# Patient Record
Sex: MALE | Race: WHITE | NOT HISPANIC OR LATINO | Employment: OTHER | URBAN - METROPOLITAN AREA
[De-identification: names, ages, dates, MRNs, and addresses within clinical notes are randomized per-mention and may not be internally consistent; named-entity substitution may affect disease eponyms.]

---

## 2017-03-04 ENCOUNTER — HOSPITAL ENCOUNTER (EMERGENCY)
Facility: HOSPITAL | Age: 57
Discharge: HOME | End: 2017-03-04
Attending: EMERGENCY MEDICINE | Admitting: EMERGENCY MEDICINE
Payer: COMMERCIAL

## 2017-03-04 ENCOUNTER — APPOINTMENT (EMERGENCY)
Dept: CT IMAGING | Facility: HOSPITAL | Age: 57
End: 2017-03-04
Payer: COMMERCIAL

## 2017-03-04 VITALS
RESPIRATION RATE: 20 BRPM | WEIGHT: 241.84 LBS | BODY MASS INDEX: 35.82 KG/M2 | HEIGHT: 69 IN | OXYGEN SATURATION: 95 % | DIASTOLIC BLOOD PRESSURE: 71 MMHG | HEART RATE: 72 BPM | TEMPERATURE: 98.2 F | SYSTOLIC BLOOD PRESSURE: 120 MMHG

## 2017-03-04 DIAGNOSIS — K57.32 DIVERTICULITIS OF LARGE INTESTINE WITHOUT PERFORATION OR ABSCESS WITHOUT BLEEDING: Primary | ICD-10-CM

## 2017-03-04 LAB
ALBUMIN SERPL BCP-MCNC: 3.8 G/DL (ref 3.5–5)
ALP SERPL-CCNC: 56 U/L (ref 46–116)
ALT SERPL W P-5'-P-CCNC: 39 U/L (ref 12–78)
ANION GAP SERPL CALCULATED.3IONS-SCNC: 10 MMOL/L (ref 4–13)
AST SERPL W P-5'-P-CCNC: 17 U/L (ref 5–45)
BACTERIA UR QL AUTO: ABNORMAL /HPF
BASOPHILS # BLD AUTO: 0.02 THOUSANDS/ΜL (ref 0–0.1)
BASOPHILS NFR BLD AUTO: 0 % (ref 0–1)
BILIRUB SERPL-MCNC: 0.4 MG/DL (ref 0.2–1)
BILIRUB UR QL STRIP: NEGATIVE
BUN SERPL-MCNC: 16 MG/DL (ref 5–25)
CALCIUM SERPL-MCNC: 9.2 MG/DL (ref 8.3–10.1)
CHLORIDE SERPL-SCNC: 105 MMOL/L (ref 100–108)
CLARITY UR: CLEAR
CO2 SERPL-SCNC: 28 MMOL/L (ref 21–32)
COLOR UR: YELLOW
CREAT SERPL-MCNC: 1.11 MG/DL (ref 0.6–1.3)
EOSINOPHIL # BLD AUTO: 0.22 THOUSAND/ΜL (ref 0–0.61)
EOSINOPHIL NFR BLD AUTO: 3 % (ref 0–6)
ERYTHROCYTE [DISTWIDTH] IN BLOOD BY AUTOMATED COUNT: 14.3 % (ref 11.6–15.1)
GFR SERPL CREATININE-BSD FRML MDRD: >60 ML/MIN/1.73SQ M
GLUCOSE SERPL-MCNC: 88 MG/DL (ref 65–140)
GLUCOSE UR STRIP-MCNC: NEGATIVE MG/DL
HCT VFR BLD AUTO: 45.1 % (ref 36.5–49.3)
HGB BLD-MCNC: 14.7 G/DL (ref 12–17)
HGB UR QL STRIP.AUTO: ABNORMAL
KETONES UR STRIP-MCNC: NEGATIVE MG/DL
LEUKOCYTE ESTERASE UR QL STRIP: NEGATIVE
LYMPHOCYTES # BLD AUTO: 1.81 THOUSANDS/ΜL (ref 0.6–4.47)
LYMPHOCYTES NFR BLD AUTO: 28 % (ref 14–44)
MCH RBC QN AUTO: 30.8 PG (ref 26.8–34.3)
MCHC RBC AUTO-ENTMCNC: 32.6 G/DL (ref 31.4–37.4)
MCV RBC AUTO: 94 FL (ref 82–98)
MONOCYTES # BLD AUTO: 0.55 THOUSAND/ΜL (ref 0.17–1.22)
MONOCYTES NFR BLD AUTO: 8 % (ref 4–12)
MUCOUS THREADS UR QL AUTO: ABNORMAL
NEUTROPHILS # BLD AUTO: 3.96 THOUSANDS/ΜL (ref 1.85–7.62)
NEUTS SEG NFR BLD AUTO: 61 % (ref 43–75)
NITRITE UR QL STRIP: NEGATIVE
NON-SQ EPI CELLS URNS QL MICRO: ABNORMAL /HPF
PH UR STRIP.AUTO: 5.5 [PH] (ref 4.5–8)
PLATELET # BLD AUTO: 249 THOUSANDS/UL (ref 149–390)
PMV BLD AUTO: 10.2 FL (ref 8.9–12.7)
POTASSIUM SERPL-SCNC: 4.1 MMOL/L (ref 3.5–5.3)
PROT SERPL-MCNC: 7.4 G/DL (ref 6.4–8.2)
PROT UR STRIP-MCNC: NEGATIVE MG/DL
RBC # BLD AUTO: 4.78 MILLION/UL (ref 3.88–5.62)
RBC #/AREA URNS AUTO: ABNORMAL /HPF
SODIUM SERPL-SCNC: 143 MMOL/L (ref 136–145)
SP GR UR STRIP.AUTO: 1.02 (ref 1–1.03)
UROBILINOGEN UR QL STRIP.AUTO: 0.2 E.U./DL
WBC # BLD AUTO: 6.56 THOUSAND/UL (ref 4.31–10.16)
WBC #/AREA URNS AUTO: ABNORMAL /HPF

## 2017-03-04 PROCEDURE — 80053 COMPREHEN METABOLIC PANEL: CPT | Performed by: EMERGENCY MEDICINE

## 2017-03-04 PROCEDURE — 81001 URINALYSIS AUTO W/SCOPE: CPT | Performed by: EMERGENCY MEDICINE

## 2017-03-04 PROCEDURE — 87086 URINE CULTURE/COLONY COUNT: CPT | Performed by: EMERGENCY MEDICINE

## 2017-03-04 PROCEDURE — 96360 HYDRATION IV INFUSION INIT: CPT

## 2017-03-04 PROCEDURE — 74177 CT ABD & PELVIS W/CONTRAST: CPT

## 2017-03-04 PROCEDURE — 99284 EMERGENCY DEPT VISIT MOD MDM: CPT

## 2017-03-04 PROCEDURE — 85025 COMPLETE CBC W/AUTO DIFF WBC: CPT | Performed by: EMERGENCY MEDICINE

## 2017-03-04 PROCEDURE — 36415 COLL VENOUS BLD VENIPUNCTURE: CPT | Performed by: EMERGENCY MEDICINE

## 2017-03-04 RX ORDER — ARIPIPRAZOLE 2 MG/1
2 TABLET ORAL
COMMUNITY
End: 2019-08-16 | Stop reason: ALTCHOICE

## 2017-03-04 RX ORDER — CIPROFLOXACIN 500 MG/1
500 TABLET, FILM COATED ORAL ONCE
Status: COMPLETED | OUTPATIENT
Start: 2017-03-04 | End: 2017-03-04

## 2017-03-04 RX ORDER — LISINOPRIL 30 MG/1
30 TABLET ORAL DAILY
COMMUNITY
End: 2020-12-01 | Stop reason: SDUPTHER

## 2017-03-04 RX ORDER — METRONIDAZOLE 500 MG/1
500 TABLET ORAL 3 TIMES DAILY
Qty: 30 TABLET | Refills: 0 | Status: SHIPPED | OUTPATIENT
Start: 2017-03-04 | End: 2017-03-14

## 2017-03-04 RX ORDER — CIPROFLOXACIN 500 MG/1
500 TABLET, FILM COATED ORAL 2 TIMES DAILY
Qty: 20 TABLET | Refills: 0 | Status: SHIPPED | OUTPATIENT
Start: 2017-03-04 | End: 2017-03-14

## 2017-03-04 RX ORDER — ATORVASTATIN CALCIUM 20 MG/1
20 TABLET, FILM COATED ORAL
COMMUNITY
End: 2020-12-01 | Stop reason: SDUPTHER

## 2017-03-04 RX ORDER — METRONIDAZOLE 500 MG/1
500 TABLET ORAL ONCE
Status: COMPLETED | OUTPATIENT
Start: 2017-03-04 | End: 2017-03-04

## 2017-03-04 RX ADMIN — IOHEXOL 100 ML: 350 INJECTION, SOLUTION INTRAVENOUS at 11:40

## 2017-03-04 RX ADMIN — SODIUM CHLORIDE 1000 ML: 0.9 INJECTION, SOLUTION INTRAVENOUS at 10:33

## 2017-03-04 RX ADMIN — METRONIDAZOLE 500 MG: 500 TABLET ORAL at 13:53

## 2017-03-04 RX ADMIN — CIPROFLOXACIN 500 MG: 500 TABLET, FILM COATED ORAL at 13:53

## 2017-03-05 LAB — BACTERIA UR CULT: NORMAL

## 2019-07-08 ENCOUNTER — TELEPHONE (OUTPATIENT)
Dept: CARDIOLOGY CLINIC | Facility: CLINIC | Age: 59
End: 2019-07-08

## 2019-07-09 NOTE — TELEPHONE ENCOUNTER
This patient called back last evening and got the answering service  Please call him back [phone]      Thanks, Dru Brown

## 2019-08-16 ENCOUNTER — OFFICE VISIT (OUTPATIENT)
Dept: CARDIOLOGY CLINIC | Facility: CLINIC | Age: 59
End: 2019-08-16
Payer: COMMERCIAL

## 2019-08-16 ENCOUNTER — TELEPHONE (OUTPATIENT)
Dept: CARDIOLOGY CLINIC | Facility: CLINIC | Age: 59
End: 2019-08-16

## 2019-08-16 VITALS
DIASTOLIC BLOOD PRESSURE: 62 MMHG | BODY MASS INDEX: 34.93 KG/M2 | HEART RATE: 87 BPM | HEIGHT: 69 IN | OXYGEN SATURATION: 98 % | WEIGHT: 235.8 LBS | SYSTOLIC BLOOD PRESSURE: 126 MMHG

## 2019-08-16 DIAGNOSIS — E78.2 MIXED HYPERLIPIDEMIA: ICD-10-CM

## 2019-08-16 DIAGNOSIS — I20.0 UNSTABLE ANGINA (HCC): Primary | ICD-10-CM

## 2019-08-16 DIAGNOSIS — I10 BENIGN ESSENTIAL HYPERTENSION: ICD-10-CM

## 2019-08-16 PROCEDURE — 99204 OFFICE O/P NEW MOD 45 MIN: CPT | Performed by: INTERNAL MEDICINE

## 2019-08-16 PROCEDURE — 93000 ELECTROCARDIOGRAM COMPLETE: CPT | Performed by: INTERNAL MEDICINE

## 2019-08-16 PROCEDURE — 3074F SYST BP LT 130 MM HG: CPT | Performed by: INTERNAL MEDICINE

## 2019-08-16 RX ORDER — ASPIRIN 81 MG/1
81 TABLET ORAL DAILY
COMMUNITY
End: 2022-02-01

## 2019-08-16 RX ORDER — DULOXETIN HYDROCHLORIDE 30 MG/1
30 CAPSULE, DELAYED RELEASE ORAL DAILY
COMMUNITY
End: 2020-12-01

## 2019-09-18 NOTE — TELEPHONE ENCOUNTER
Pt called back and said his insurance is not effective until October so we rescheduled the procedure date to 10/04/19 at [facility] with LT

## 2019-09-18 NOTE — TELEPHONE ENCOUNTER
Pt is scheduled on 09/25/19 for a LHC with Dr Diana Curran at [facility]  Pt aware of instructions      Can I get preauth please and thank you

## 2019-10-02 ENCOUNTER — TELEPHONE (OUTPATIENT)
Dept: CARDIOLOGY CLINIC | Facility: CLINIC | Age: 59
End: 2019-10-02

## 2019-10-02 LAB
BASOPHILS # BLD AUTO: 40 CELLS/UL (ref 0–200)
BASOPHILS NFR BLD AUTO: 0.7 %
BUN SERPL-MCNC: 14 MG/DL (ref 7–25)
BUN/CREAT SERPL: NORMAL (CALC) (ref 6–22)
CALCIUM SERPL-MCNC: 9.6 MG/DL (ref 8.6–10.3)
CHLORIDE SERPL-SCNC: 105 MMOL/L (ref 98–110)
CO2 SERPL-SCNC: 29 MMOL/L (ref 20–32)
CREAT SERPL-MCNC: 1.05 MG/DL (ref 0.7–1.33)
EOSINOPHIL # BLD AUTO: 211 CELLS/UL (ref 15–500)
EOSINOPHIL NFR BLD AUTO: 3.7 %
ERYTHROCYTE [DISTWIDTH] IN BLOOD BY AUTOMATED COUNT: 13 % (ref 11–15)
GLUCOSE SERPL-MCNC: 84 MG/DL (ref 65–139)
HCT VFR BLD AUTO: 43.8 % (ref 38.5–50)
HGB BLD-MCNC: 14.5 G/DL (ref 13.2–17.1)
INR PPP: 1
LYMPHOCYTES # BLD AUTO: 2046 CELLS/UL (ref 850–3900)
LYMPHOCYTES NFR BLD AUTO: 35.9 %
MCH RBC QN AUTO: 31.5 PG (ref 27–33)
MCHC RBC AUTO-ENTMCNC: 33.1 G/DL (ref 32–36)
MCV RBC AUTO: 95 FL (ref 80–100)
MONOCYTES # BLD AUTO: 445 CELLS/UL (ref 200–950)
MONOCYTES NFR BLD AUTO: 7.8 %
NEUTROPHILS # BLD AUTO: 2958 CELLS/UL (ref 1500–7800)
NEUTROPHILS NFR BLD AUTO: 51.9 %
PLATELET # BLD AUTO: 248 THOUSAND/UL (ref 140–400)
PMV BLD REES-ECKER: 9.8 FL (ref 7.5–12.5)
POTASSIUM SERPL-SCNC: 4.3 MMOL/L (ref 3.5–5.3)
PROTHROMBIN TIME: 10.2 SEC (ref 9–11.5)
RBC # BLD AUTO: 4.61 MILLION/UL (ref 4.2–5.8)
SL AMB EGFR AFRICAN AMERICAN: 91 ML/MIN/1.73M2
SL AMB EGFR NON AFRICAN AMERICAN: 78 ML/MIN/1.73M2
SODIUM SERPL-SCNC: 140 MMOL/L (ref 135–146)
WBC # BLD AUTO: 5.7 THOUSAND/UL (ref 3.8–10.8)

## 2019-10-02 NOTE — TELEPHONE ENCOUNTER
His cath [phone] on 10/4/19 at [facility] was approved by [facility] program  Auth# [id number]  10/4/19-11/16/19

## 2019-10-02 NOTE — TELEPHONE ENCOUNTER
Called patient for updated insurance information  Patient states he now has [employer] PPO    ID- [id number]  Group # [id number]

## 2019-10-02 NOTE — TELEPHONE ENCOUNTER
Called patient for updated insurance information  Patient states he now has [employer] PPO    ID- [id number]  Group # [id number]

## 2019-10-03 ENCOUNTER — TELEPHONE (OUTPATIENT)
Dept: INPATIENT UNIT | Facility: HOSPITAL | Age: 59
End: 2019-10-03

## 2019-10-03 RX ORDER — SODIUM CHLORIDE 9 MG/ML
100 INJECTION, SOLUTION INTRAVENOUS CONTINUOUS
Status: CANCELLED | OUTPATIENT
Start: 2019-10-04

## 2019-10-03 RX ORDER — ASPIRIN 81 MG/1
324 TABLET, CHEWABLE ORAL ONCE
Status: CANCELLED | OUTPATIENT
Start: 2019-10-03 | End: 2019-10-03

## 2019-10-04 ENCOUNTER — HOSPITAL ENCOUNTER (OUTPATIENT)
Dept: NON INVASIVE DIAGNOSTICS | Facility: HOSPITAL | Age: 59
Discharge: HOME | End: 2019-10-04
Attending: INTERNAL MEDICINE | Admitting: INTERNAL MEDICINE
Payer: COMMERCIAL

## 2019-10-04 VITALS
TEMPERATURE: 97.8 F | RESPIRATION RATE: 18 BRPM | OXYGEN SATURATION: 92 % | SYSTOLIC BLOOD PRESSURE: 118 MMHG | DIASTOLIC BLOOD PRESSURE: 71 MMHG | HEART RATE: 77 BPM

## 2019-10-04 DIAGNOSIS — I20.0 UNSTABLE ANGINA (HCC): ICD-10-CM

## 2019-10-04 LAB
ATRIAL RATE: 84 BPM
P AXIS: 44 DEGREES
PR INTERVAL: 146 MS
QRS AXIS: 35 DEGREES
QRSD INTERVAL: 84 MS
QT INTERVAL: 366 MS
QTC INTERVAL: 432 MS
T WAVE AXIS: 56 DEGREES
VENTRICULAR RATE: 84 BPM

## 2019-10-04 PROCEDURE — 93458 L HRT ARTERY/VENTRICLE ANGIO: CPT | Performed by: INTERNAL MEDICINE

## 2019-10-04 PROCEDURE — C1769 GUIDE WIRE: HCPCS | Performed by: INTERNAL MEDICINE

## 2019-10-04 PROCEDURE — 99152 MOD SED SAME PHYS/QHP 5/>YRS: CPT | Performed by: INTERNAL MEDICINE

## 2019-10-04 PROCEDURE — 99153 MOD SED SAME PHYS/QHP EA: CPT | Performed by: INTERNAL MEDICINE

## 2019-10-04 PROCEDURE — C1894 INTRO/SHEATH, NON-LASER: HCPCS | Performed by: INTERNAL MEDICINE

## 2019-10-04 PROCEDURE — 93010 ELECTROCARDIOGRAM REPORT: CPT | Performed by: INTERNAL MEDICINE

## 2019-10-04 PROCEDURE — NC001 PR NO CHARGE: Performed by: INTERNAL MEDICINE

## 2019-10-04 PROCEDURE — 93005 ELECTROCARDIOGRAM TRACING: CPT

## 2019-10-04 RX ORDER — FENTANYL CITRATE 50 UG/ML
INJECTION, SOLUTION INTRAMUSCULAR; INTRAVENOUS CODE/TRAUMA/SEDATION MEDICATION
Status: COMPLETED | OUTPATIENT
Start: 2019-10-04 | End: 2019-10-04

## 2019-10-04 RX ORDER — MIDAZOLAM HYDROCHLORIDE 1 MG/ML
INJECTION INTRAMUSCULAR; INTRAVENOUS CODE/TRAUMA/SEDATION MEDICATION
Status: COMPLETED | OUTPATIENT
Start: 2019-10-04 | End: 2019-10-04

## 2019-10-04 RX ORDER — HEPARIN SODIUM 1000 [USP'U]/ML
INJECTION, SOLUTION INTRAVENOUS; SUBCUTANEOUS CODE/TRAUMA/SEDATION MEDICATION
Status: COMPLETED | OUTPATIENT
Start: 2019-10-04 | End: 2019-10-04

## 2019-10-04 RX ORDER — SODIUM CHLORIDE 9 MG/ML
100 INJECTION, SOLUTION INTRAVENOUS CONTINUOUS
Status: DISCONTINUED | OUTPATIENT
Start: 2019-10-04 | End: 2019-10-04 | Stop reason: HOSPADM

## 2019-10-04 RX ORDER — ASPIRIN 81 MG/1
324 TABLET, CHEWABLE ORAL ONCE
Status: COMPLETED | OUTPATIENT
Start: 2019-10-04 | End: 2019-10-04

## 2019-10-04 RX ORDER — VERAPAMIL HYDROCHLORIDE 2.5 MG/ML
INJECTION, SOLUTION INTRAVENOUS CODE/TRAUMA/SEDATION MEDICATION
Status: COMPLETED | OUTPATIENT
Start: 2019-10-04 | End: 2019-10-04

## 2019-10-04 RX ORDER — NITROGLYCERIN 20 MG/100ML
INJECTION INTRAVENOUS CODE/TRAUMA/SEDATION MEDICATION
Status: COMPLETED | OUTPATIENT
Start: 2019-10-04 | End: 2019-10-04

## 2019-10-04 RX ORDER — LIDOCAINE HYDROCHLORIDE 10 MG/ML
INJECTION, SOLUTION INFILTRATION; PERINEURAL CODE/TRAUMA/SEDATION MEDICATION
Status: COMPLETED | OUTPATIENT
Start: 2019-10-04 | End: 2019-10-04

## 2019-10-04 RX ADMIN — MIDAZOLAM 1 MG: 1 INJECTION INTRAMUSCULAR; INTRAVENOUS at 10:53

## 2019-10-04 RX ADMIN — SODIUM CHLORIDE 100 ML/HR: 0.9 INJECTION, SOLUTION INTRAVENOUS at 08:00

## 2019-10-04 RX ADMIN — FENTANYL CITRATE 50 MCG: 50 INJECTION, SOLUTION INTRAMUSCULAR; INTRAVENOUS at 10:53

## 2019-10-04 RX ADMIN — LIDOCAINE HYDROCHLORIDE 1 ML: 10 INJECTION, SOLUTION INFILTRATION; PERINEURAL at 10:41

## 2019-10-04 RX ADMIN — HEPARIN SODIUM 4000 UNITS: 1000 INJECTION INTRAVENOUS; SUBCUTANEOUS at 10:44

## 2019-10-04 RX ADMIN — VERAPAMIL HYDROCHLORIDE 2.5 MG: 2.5 INJECTION INTRAVENOUS at 10:45

## 2019-10-04 RX ADMIN — NITROGLYCERIN 200 MCG: 20 INJECTION INTRAVENOUS at 10:45

## 2019-10-04 RX ADMIN — ASPIRIN 81 MG 324 MG: 81 TABLET ORAL at 07:59

## 2019-10-04 RX ADMIN — IOHEXOL 96 ML: 350 INJECTION, SOLUTION INTRAVENOUS at 11:04

## 2019-10-04 RX ADMIN — FENTANYL CITRATE 50 MCG: 50 INJECTION, SOLUTION INTRAMUSCULAR; INTRAVENOUS at 10:36

## 2019-10-04 RX ADMIN — MIDAZOLAM 2 MG: 1 INJECTION INTRAMUSCULAR; INTRAVENOUS at 10:36

## 2019-10-04 NOTE — DISCHARGE INSTRUCTIONS
1  Please see the post cardiac catheterization dishcarge instructions  No heavy lifting, greater than 10 lbs  or strenuous  activity for 48 hrs  2 Remove band aid tomorrow  Shower and wash area- wrist gently with soap and water- beginning tomorrow  Rinse and pat dry  Apply new water seal band aid  Repeat this process for 5 days  No powders, creams lotions or antibiotic ointments  for 5 days  No tub baths, hot tubs or swimming for 5 days  3  Please call our office ([phone]) if you have any fever, redness, swelling, discharge from your wrist access site  4 No driving for 1 day      Left Heart Catheterization   WHAT YOU NEED TO KNOW:   A left heart catheterization is a procedure to look at your heart and its arteries  You may need this procedure if you have chest pain, heart disease, or your heart is not working as it should  DISCHARGE INSTRUCTIONS:   Follow up with your healthcare provider as directed:  Write down your questions so you remember to ask them during your visits  Limit activity as directed:   · Avoid unnecessary stair climbing for 48 hours, if a catheter was put in your groin  · Do not place pressure on your arm, hand, or wrist, if the catheter was placed in your wrist  Avoid pushing, pulling, or heavy lifting with that arm  · If you need to cough, support the area where the catheter was inserted with your hand  · Ask your healthcare provider how long you need to limit movement and avoid certain activities  · You may feel like resting more after your procedure  Slowly start to do more each day  Rest when you feel it is needed  Drink liquids as directed:  Liquids help flush the dye used for your procedure out of your body  Ask your healthcare provider how much liquid to drink each day, and which liquids to drink  Some foods, such as soup and fruit, also provide liquid  Wound care:  Ask your healthcare provider about how to care for your incision wound   Ask when you can get into a tub, shower, or pool  Contact your healthcare provider if:   · You have a fever  · The skin around your wound is red, swollen, or has pus coming from it  · You have trouble breathing, or your skin is itchy, swollen, or has a rash  · You have questions or concerns about your condition or care  Seek care immediately or call 911 if:   · The area where the catheter was placed is swollen and filled with blood or is bleeding  · The leg or arm used for the procedure becomes numb or turns white or blue  · You feel lightheaded, short of breath, and have chest pain  · You cough up blood  · You have any of the following signs of a heart attack:      ¨ Squeezing, pressure, or pain in your chest that lasts longer than 5 minutes or returns    ¨ Discomfort or pain in your back, neck, jaw, stomach, or arm     ¨ Trouble breathing    ¨ Nausea or vomiting    ¨ Lightheadedness or a sudden cold sweat, especially with chest pain or trouble breathing    · Your arm or leg feels warm, tender, and painful  It may look swollen and red  · You have any of the following signs of a stroke:     ¨ Part of your face droops or is numb    ¨ Weakness in an arm or leg    ¨ Confusion or difficulty speaking    ¨ Dizziness, a severe headache, or vision loss  © 2017 [employer] Information is for End User's use only and may not be sold, redistributed or otherwise used for commercial purposes  All illustrations and images included in CareNotes® are the copyrighted property of A D A M [employer] Inc  or Colin Christian  The above information is an  only  It is not intended as medical advice for individual conditions or treatments  Talk to your doctor, nurse or pharmacist before following any medical regimen to see if it is safe and effective for you

## 2019-10-04 NOTE — H&P
Cardiology   Abbie Brooks  64 y o  male MRN: [id number]  [facility] [place] [facility] Encounter: [id number]      PCP:    Assessment/Plan     Risk factors:  -HLD  -HTN  -RUKHSANA  -familial premature CAD, significant    Assessment/Plan:  Patient with symptoms consistent with unstable angina given chest pain at rest or with exertion escalating over the last few months  Proceed with cardiac catheterization in further intervention based on catheterization findings  Case discussed and reviewed with Dr Abigail Cruz who agrees with my assessment and plan  History of Present Illness     HPI:  Abbie Brooks  is a 64 y o  male who presents with progressive midsternal, nonradiating anginal like chest discomfort with associated shortness of breath lightheadedness and diaphoresis over the past year  Symptoms now occurring even at rest   Patient is primarily sedentary  Patient denies heart failure symptoms  Last meal was the night prior to presentation  No recreational drug, alcohol or tobacco use reported  Significant family history of premature coronary artery disease  Historical Information   Past Medical History:   Diagnosis Date    Asthma due to seasonal allergies     CPAP (continuous positive airway pressure) dependence     Hyperlipidemia     Hypertension     Psychiatric disorder     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 [address]     Sleep apnea      No past surgical history on file    Social History   Social History     Substance and Sexual Activity   Alcohol Use Yes    Comment: occasional     Social History     Substance and Sexual Activity   Drug Use No     Social History     Tobacco Use   Smoking Status Never Smoker   Smokeless Tobacco Never Used     Family History:   Family History   Problem Relation Age of Onset    Hypertension Mother     Arthritis Mother     Hyperlipidemia Mother     Anxiety disorder Mother     Hypertension Father     Coronary artery disease Father     Diabetes Father     Hyperlipidemia Father     Bipolar disorder Father     Cancer Maternal Grandmother     Heart disease Neg Hx     Heart attack Neg Hx     Asthma Neg Hx     Arrhythmia Neg Hx     Anemia Neg Hx     Clotting disorder Neg Hx     Fainting Neg Hx     Heart failure Neg Hx        Meds/Allergies   PTA meds:   Prior to Admission Medications   Prescriptions Last Dose Informant Patient Reported? Taking? Brexpiprazole (REXULTI) 1 MG tablet   Yes No   Sig: Take 1 mg by mouth daily   DULoxetine (CYMBALTA) 30 mg delayed release capsule   Yes No   Sig: Take 30 mg by mouth daily   aspirin (ECOTRIN LOW STRENGTH) 81 mg EC tablet 10/3/2019 at Unknown time  Yes Yes   Sig: Take 81 mg by mouth daily   atorvastatin (LIPITOR) 20 mg tablet  Self Yes No   Sig: Take 20 mg by mouth daily   lisinopril (ZESTRIL) 30 mg tablet  Self Yes No   Sig: Take 30 mg by mouth daily       Facility-Administered Medications: None     Allergies   Allergen Reactions    Lamotrigine Hives       Objective   Vitals: Blood pressure 132/78, pulse 82, temperature 97 8 °F (36 6 °C), temperature source Oral, resp  rate 18, SpO2 92 %  No intake or output data in the 24 hours ending 10/04/19 0950    Invasive Devices     Peripheral Intravenous Line            Peripheral IV 10/04/19 Left Wrist less than 1 day                Review of Systems:  As noted in HPI      Physical Exam   General Appearance:    Alert, cooperative, no distress, appears stated age   Head:    Normocephalic, atraumatic   Eyes:    PERRL, conjunctiva/corneas clear, EOM's intact   Neck:   Supple, No thyroid enlargement/ no carotid bruit or JVD   Lungs:     Clear to auscultation bilaterally, respirations unlabored   Heart:   Normal Rate, Regular rhythm, S1 and S2 normal, no murmur, rub or gallop   Abdomen:     Soft, non-tender, bowel sounds active all four quadrants   Extremities:   Extremities normal, atraumatic, no edema, warm to touch   Pulses:   2+ and symmetric all extremities   Skin:   Warm, Dry, intact Neurologic:   CNII-XII intact  No focal deficit       Lab Results:   CBC with diff:   Results from last 7 days   Lab Units 10/01/19  1228   WHITE BLOOD CELL COUNT  Thousand/uL 5 7   RED BLOOD CELL COUNT  Million/uL 4 61   HEMOGLOBIN  g/dL 14 5   HEMATOCRIT  % 43 8   MCV  fL 95 0   MCH  pg 31 5   MCHC  g/dL 33 1   RDW  % 13 0   PLATELETS  Thousand/uL 248     CMP:   Results from last 7 days   Lab Units 10/01/19  1228   SODIUM mmol/L 140   POTASSIUM mmol/L 4 3   CHLORIDE mmol/L 105   CO2 mmol/L 29   BUN mg/dL 14   CREATININE mg/dL 1 05   SL AMB CALCIUM mg/dL 9 6     Coags:   Results from last 7 days   Lab Units 10/01/19  1228   INR  1 0     Lipid Profile:    07/03/2015  Total 198, HDL 36, , LDL 90      ECHO:  Ordered but not obtained    Stress Test:  None documented    EKG:  Normal sinus rhythm 08/16/2019      Cardiac catheterization: Pending    Tele:  Sinus rhythm    Code Status:  Full code    [facility] Everywhere [facility] reviewed:  Yes    ** Please Note: Fluency DirectDictation voice to text software may have been used in the creation of this document   **

## 2019-10-10 ENCOUNTER — HOSPITAL ENCOUNTER (OUTPATIENT)
Dept: NON INVASIVE DIAGNOSTICS | Facility: CLINIC | Age: 59
Discharge: HOME | End: 2019-10-10
Payer: COMMERCIAL

## 2019-10-10 DIAGNOSIS — I20.0 UNSTABLE ANGINA (HCC): ICD-10-CM

## 2019-10-10 PROCEDURE — 93306 TTE W/DOPPLER COMPLETE: CPT

## 2019-10-10 PROCEDURE — 93306 TTE W/DOPPLER COMPLETE: CPT | Performed by: INTERNAL MEDICINE

## 2019-10-11 ENCOUNTER — TELEPHONE (OUTPATIENT)
Dept: CARDIOLOGY CLINIC | Facility: CLINIC | Age: 59
End: 2019-10-11

## 2019-10-11 NOTE — TELEPHONE ENCOUNTER
Called and spoke to Gregory in regards to his recent echo     ----- Message from Kimberly Morales MD sent at 10/10/2019  4:57 PM EDT -----  Please make patient aware that this test was normal

## 2020-03-16 ENCOUNTER — OFFICE VISIT (OUTPATIENT)
Dept: URGENT CARE | Facility: CLINIC | Age: 60
End: 2020-03-16
Payer: COMMERCIAL

## 2020-03-16 VITALS
BODY MASS INDEX: 35.99 KG/M2 | DIASTOLIC BLOOD PRESSURE: 81 MMHG | SYSTOLIC BLOOD PRESSURE: 148 MMHG | HEART RATE: 101 BPM | HEIGHT: 69 IN | TEMPERATURE: 98.1 F | WEIGHT: 243 LBS | RESPIRATION RATE: 18 BRPM | OXYGEN SATURATION: 94 %

## 2020-03-16 DIAGNOSIS — J06.9 ACUTE URI: Primary | ICD-10-CM

## 2020-03-16 PROCEDURE — 99213 OFFICE O/P EST LOW 20 MIN: CPT | Performed by: NURSE PRACTITIONER

## 2020-03-16 RX ORDER — ALBUTEROL SULFATE 90 UG/1
2 AEROSOL, METERED RESPIRATORY (INHALATION) EVERY 6 HOURS PRN
COMMUNITY
End: 2020-03-16 | Stop reason: SDUPTHER

## 2020-03-16 RX ORDER — ALBUTEROL SULFATE 90 UG/1
2 AEROSOL, METERED RESPIRATORY (INHALATION) EVERY 6 HOURS PRN
Qty: 1 INHALER | Refills: 0 | Status: SHIPPED | OUTPATIENT
Start: 2020-03-16 | End: 2022-02-01 | Stop reason: ALTCHOICE

## 2020-03-16 RX ORDER — FLUTICASONE PROPIONATE 50 MCG
1 SPRAY, SUSPENSION (ML) NASAL DAILY
Qty: 1 BOTTLE | Refills: 0 | Status: SHIPPED | OUTPATIENT
Start: 2020-03-16 | End: 2022-02-01 | Stop reason: ALTCHOICE

## 2020-03-16 RX ORDER — FLUTICASONE FUROATE AND VILANTEROL 100; 25 UG/1; UG/1
1 POWDER RESPIRATORY (INHALATION) DAILY PRN
COMMUNITY
End: 2022-02-01 | Stop reason: ALTCHOICE

## 2020-03-16 NOTE — PATIENT INSTRUCTIONS
Flonase 1 spray each nostril   Mucinex as directed for chest congestion   Tylenol every 4 hours for pain or fever  Ibuprofen every 6 hours for pain or fever  Increase fluid intake   Follow up with your PCP    Upper Respiratory Infection   WHAT YOU NEED TO KNOW:   An upper respiratory infection is also called the common cold  It is an infection that can affect your nose, throat, ears, and sinuses  For healthy people, the common cold is usually not serious and does not need special treatment  Cold symptoms are usually worst for the first 3 to 5 days  Most people get better in 7 to 14 days  You may continue to cough for 2 to 3 weeks  Colds are caused by viruses and do not get better with antibiotics  DISCHARGE INSTRUCTIONS:   Return to the emergency department if:   · You have chest pain or trouble breathing  Contact your healthcare provider if:   · You have a fever over 102ºF (39°C)  · Your sore throat gets worse or you see white or yellow spots in your throat  · Your symptoms get worse after 3 to 5 days or your cold is not better in 14 days  · You have a rash anywhere on your skin  · You have large, tender lumps in your neck  · You have thick, green or yellow drainage from your nose  · You cough up thick yellow, green, or bloody mucus  · You have vomiting for more than 24 hours and cannot keep fluids down  · You have a bad earache  · You have questions or concerns about your condition or care  Medicines: You may need any of the following:  · Decongestants  help reduce nasal congestion and help you breathe more easily  If you take decongestant pills, they may make you feel restless or cause problems with your sleep  Do not use decongestant sprays for more than a few days  · Cough suppressants  help reduce coughing  Ask your healthcare provider which type of cough medicine is best for you  · NSAIDs , such as ibuprofen, help decrease swelling, pain, and fever   NSAIDs can cause stomach bleeding or kidney problems in certain people  If you take blood thinner medicine, always ask your healthcare provider if NSAIDs are safe for you  Always read the medicine label and follow directions  · Acetaminophen  decreases pain and fever  It is available without a doctor's order  Ask how much to take and how often to take it  Follow directions  Read the labels of all other medicines you are using to see if they also contain acetaminophen, or ask your doctor or pharmacist  Acetaminophen can cause liver damage if not taken correctly  Do not use more than 4 grams (4,000 milligrams) total of acetaminophen in one day  · Take your medicine as directed  Contact your healthcare provider if you think your medicine is not helping or if you have side effects  Tell him or her if you are allergic to any medicine  Keep a list of the medicines, vitamins, and herbs you take  Include the amounts, and when and why you take them  Bring the list or the pill bottles to follow-up visits  Carry your medicine list with you in case of an emergency  Follow up with your healthcare provider as directed:  Write down your questions so you remember to ask them during your visits  Self-care:   · Rest as much as possible  Slowly start to do more each day  · Drink more liquids as directed  Liquids will help thin and loosen mucus so you can cough it up  Liquids will also help prevent dehydration  Liquids that help prevent dehydration include water, fruit juice, and broth  Do not drink liquids that contain caffeine  Caffeine can increase your risk for dehydration  Ask your healthcare provider how much liquid to drink each day  · Soothe a sore throat  Gargle with warm salt water  This helps your sore throat feel better  Make salt water by dissolving ¼ teaspoon salt in 1 cup warm water  You may also suck on hard candy or throat lozenges  You may use a sore throat spray  · Use a humidifier or vaporizer    Use a cool mist humidifier or a vaporizer to increase air moisture in your home  This may make it easier for you to breathe and help decrease your cough  · Use saline nasal drops as directed  These help relieve congestion  · Apply petroleum-based jelly around the outside of your nostrils  This can decrease irritation from blowing your nose  · Do not smoke  Nicotine and other chemicals in cigarettes and cigars can make your symptoms worse  They can also cause infections such as bronchitis or pneumonia  Ask your healthcare provider for information if you currently smoke and need help to quit  E-cigarettes or smokeless tobacco still contain nicotine  Talk to your healthcare provider before you use these products  Prevent spreading your cold to others:   · Try to stay away from other people during the first 2 to 3 days of your cold when it is more easily spread  · Do not share food or drinks  · Do not share hand towels with household members  · Wash your hands often, especially after you blow your nose  Turn away from other people and cover your mouth and nose with a tissue when you sneeze or cough  © 2017 [facility] Information is for End User's use only and may not be sold, redistributed or otherwise used for commercial purposes  All illustrations and images included in CareNotes® are the copyrighted property of [employer] M [employer] Inc  or Colin Christian  The above information is an  only  It is not intended as medical advice for individual conditions or treatments  Talk to your doctor, nurse or pharmacist before following any medical regimen to see if it is safe and effective for you

## 2020-03-16 NOTE — PROGRESS NOTES
[facility]        NAME: Nehemias Cary  is a 64 y o  male  : 1960    MRN: [id number]  DATE: 2020  TIME: 7:19 PM    Assessment and Plan   Acute URI [J06 9]  1  Acute URI  fluticasone (FLONASE) 50 mcg/act nasal spray    albuterol (PROVENTIL HFA,VENTOLIN HFA) 90 mcg/act inhaler         Patient Instructions   Flonase 1 spray each nostril   Mucinex as directed for chest congestion   Tylenol every 4 hours for pain or fever  Ibuprofen every 6 hours for pain or fever  Increase fluid intake   Follow up with your PCP    Follow up with PCP in 3-5 days  Proceed to  ER if symptoms worsen  Chief Complaint     Chief Complaint   Patient presents with    Cough     x 5 days, getting worse, chest congestion    Fever     x 2 days, highest 100 5    Nasal Congestion    Generalized Body Aches     sore         History of Present Illness         Patient is a 51-year-old male presenting with a five-day history of cough, sinus congestion, and body aches  He reports a fever for the past 3 days  Max temperature 100 5°  Denies chest pain or shortness of breath  Denies abdominal pain, nausea, vomiting, or diarrhea  He has been using Coricidin with some improvement  He is a nonsmoker  Did not have a flu vaccine this year  Review of Systems   Review of Systems   Constitutional: Positive for fever  Negative for activity change and chills  HENT: Positive for congestion, rhinorrhea and sinus pressure  Negative for ear pain and sore throat  Respiratory: Positive for cough  Negative for shortness of breath  Gastrointestinal: Negative for abdominal pain, diarrhea, nausea and vomiting  Skin: Negative for rash  Neurological: Negative for headaches           Current Medications       Current Outpatient Medications:     albuterol (PROVENTIL HFA,VENTOLIN HFA) 90 mcg/act inhaler, Inhale 2 puffs every 6 (six) hours as needed for wheezing, Disp: 1 Inhaler, Rfl: 0    aspirin (ECOTRIN LOW STRENGTH) 81 mg EC tablet, Take 81 mg by mouth daily, Disp: , Rfl:     atorvastatin (LIPITOR) 20 mg tablet, Take 20 mg by mouth daily, Disp: , Rfl:     Brexpiprazole (REXULTI) 1 MG tablet, Take 1 mg by mouth daily, Disp: , Rfl:     DULoxetine (CYMBALTA) 30 mg delayed release capsule, Take 30 mg by mouth daily, Disp: , Rfl:     fluticasone-vilanterol (Breo Ellipta) 100-25 mcg/inh inhaler, Inhale 1 puff daily Rinse mouth after use , Disp: , Rfl:     lisinopril (ZESTRIL) 30 mg tablet, Take 30 mg by mouth daily , Disp: , Rfl:     fluticasone (FLONASE) 50 mcg/act nasal spray, 1 spray into each nostril daily, Disp: 1 Bottle, Rfl: 0    Current Allergies     Allergies as of 03/16/2020 - Reviewed 03/16/2020   Allergen Reaction Noted    Lamotrigine Hives 02/20/2015            The following portions of the patient's history were reviewed and updated as appropriate: allergies, current medications, past family history, past medical history, past social history, past surgical history and problem list      Past Medical History:   Diagnosis Date    Asthma due to seasonal allergies     CPAP (continuous positive airway pressure) dependence     Hyperlipidemia     Hypertension     Psychiatric disorder     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[facility] [address]     Sleep apnea        History reviewed  No pertinent surgical history  Family History   Problem Relation Age of Onset    Hypertension Mother    Chap[place] Arthritis Mother     Hyperlipidemia Mother     Anxiety disorder Mother     Hypertension Father     Coronary artery disease Father     Diabetes Father     Hyperlipidemia Father     Bipolar disorder Father     Cancer Maternal Grandmother     Heart disease Neg Hx     Heart attack Neg Hx     Asthma Neg Hx     Arrhythmia Neg Hx     Anemia Neg Hx     Clotting disorder Neg Hx     Fainting Neg Hx     Heart failure Neg Hx          Medications have been verified          Objective   /81   Pulse 101   Temp 98 1 °F (36 7 °C)   Resp 18   Ht 5' 9" (1 753 m)   Wt 110 kg (243 lb)   SpO2 94%   BMI 35 88 kg/m²        Physical Exam     Physical Exam   Constitutional: He is oriented to person, place, and time  Vital signs are normal  He appears well-developed and well-nourished  He is active  No distress  HENT:   Head: Normocephalic  Right Ear: Hearing, tympanic membrane, external ear and ear canal normal    Left Ear: Hearing, tympanic membrane, external ear and ear canal normal    Nose: Nose normal    Mouth/Throat: Uvula is midline, oropharynx is clear and moist and mucous membranes are normal    Cardiovascular: Regular rhythm, S1 normal, S2 normal and normal heart sounds  Tachycardia present  Pulmonary/Chest: Effort normal and breath sounds normal  No respiratory distress  He has no decreased breath sounds  He has no wheezes  He has no rhonchi  He has no rales  Neurological: He is alert and oriented to person, place, and time  He is not disoriented  Skin: Skin is warm, dry and intact

## 2020-04-08 ENCOUNTER — TELEPHONE (OUTPATIENT)
Dept: CARDIOLOGY CLINIC | Facility: CLINIC | Age: 60
End: 2020-04-08

## 2020-04-08 DIAGNOSIS — J06.9 ACUTE URI: ICD-10-CM

## 2020-04-08 RX ORDER — FLUTICASONE PROPIONATE 50 MCG
SPRAY, SUSPENSION (ML) NASAL
Qty: 16 ML | Refills: 0 | OUTPATIENT
Start: 2020-04-08

## 2020-10-14 PROBLEM — Z99.89 OSA ON CPAP: Status: ACTIVE | Noted: 2020-10-14

## 2020-10-14 PROBLEM — G47.33 OSA ON CPAP: Status: ACTIVE | Noted: 2020-10-14

## 2020-10-15 ENCOUNTER — TELEPHONE (OUTPATIENT)
Dept: ADMINISTRATIVE | Facility: OTHER | Age: 60
End: 2020-10-15

## 2020-10-15 ENCOUNTER — OFFICE VISIT (OUTPATIENT)
Dept: FAMILY MEDICINE CLINIC | Facility: CLINIC | Age: 60
End: 2020-10-15
Payer: COMMERCIAL

## 2020-10-15 VITALS
RESPIRATION RATE: 16 BRPM | SYSTOLIC BLOOD PRESSURE: 136 MMHG | WEIGHT: 252.4 LBS | HEART RATE: 88 BPM | HEIGHT: 68 IN | TEMPERATURE: 98.6 F | BODY MASS INDEX: 38.25 KG/M2 | DIASTOLIC BLOOD PRESSURE: 72 MMHG | OXYGEN SATURATION: 95 %

## 2020-10-15 DIAGNOSIS — Z11.59 NEED FOR HEPATITIS C SCREENING TEST: ICD-10-CM

## 2020-10-15 DIAGNOSIS — R79.89 LOW TESTOSTERONE: ICD-10-CM

## 2020-10-15 DIAGNOSIS — F41.1 GENERALIZED ANXIETY DISORDER: ICD-10-CM

## 2020-10-15 DIAGNOSIS — E78.2 MIXED HYPERLIPIDEMIA: ICD-10-CM

## 2020-10-15 DIAGNOSIS — F31.81 BIPOLAR 2 DISORDER (HCC): ICD-10-CM

## 2020-10-15 DIAGNOSIS — G47.33 OSA ON CPAP: ICD-10-CM

## 2020-10-15 DIAGNOSIS — Z13.6 SCREENING FOR CARDIOVASCULAR CONDITION: ICD-10-CM

## 2020-10-15 DIAGNOSIS — Z99.89 OSA ON CPAP: ICD-10-CM

## 2020-10-15 DIAGNOSIS — Z11.4 SCREENING FOR HIV (HUMAN IMMUNODEFICIENCY VIRUS): ICD-10-CM

## 2020-10-15 DIAGNOSIS — Z23 NEED FOR VACCINATION: ICD-10-CM

## 2020-10-15 DIAGNOSIS — Z13.1 SCREENING FOR DIABETES MELLITUS: ICD-10-CM

## 2020-10-15 DIAGNOSIS — M02.30 REITER'S DISEASE, REITER'S DISEASE OF UNSPECIFIED SITE: ICD-10-CM

## 2020-10-15 DIAGNOSIS — E66.01 CLASS 2 SEVERE OBESITY WITH SERIOUS COMORBIDITY AND BODY MASS INDEX (BMI) OF 38.0 TO 38.9 IN ADULT, UNSPECIFIED OBESITY TYPE (HCC): ICD-10-CM

## 2020-10-15 DIAGNOSIS — J45.909 ASTHMA DUE TO SEASONAL ALLERGIES: ICD-10-CM

## 2020-10-15 DIAGNOSIS — I10 BENIGN ESSENTIAL HYPERTENSION: ICD-10-CM

## 2020-10-15 DIAGNOSIS — Z00.00 ANNUAL PHYSICAL EXAM: Primary | ICD-10-CM

## 2020-10-15 DIAGNOSIS — R68.82 LOW LIBIDO: ICD-10-CM

## 2020-10-15 DIAGNOSIS — Z86.010 HISTORY OF COLON POLYPS: ICD-10-CM

## 2020-10-15 PROBLEM — I20.0 UNSTABLE ANGINA (HCC): Status: RESOLVED | Noted: 2019-08-16 | Resolved: 2020-10-15

## 2020-10-15 PROCEDURE — 3078F DIAST BP <80 MM HG: CPT | Performed by: FAMILY MEDICINE

## 2020-10-15 PROCEDURE — 3725F SCREEN DEPRESSION PERFORMED: CPT | Performed by: FAMILY MEDICINE

## 2020-10-15 PROCEDURE — 99396 PREV VISIT EST AGE 40-64: CPT | Performed by: FAMILY MEDICINE

## 2020-10-15 PROCEDURE — 90471 IMMUNIZATION ADMIN: CPT | Performed by: FAMILY MEDICINE

## 2020-10-15 PROCEDURE — 90682 RIV4 VACC RECOMBINANT DNA IM: CPT | Performed by: FAMILY MEDICINE

## 2020-10-15 PROCEDURE — 99203 OFFICE O/P NEW LOW 30 MIN: CPT | Performed by: FAMILY MEDICINE

## 2020-10-15 PROCEDURE — 1036F TOBACCO NON-USER: CPT | Performed by: FAMILY MEDICINE

## 2020-10-15 RX ORDER — BREXPIPRAZOLE 2 MG/1
2 TABLET ORAL
COMMUNITY
Start: 2020-08-06 | End: 2020-10-15 | Stop reason: SDUPTHER

## 2020-10-15 RX ORDER — MELATONIN
1000 DAILY
COMMUNITY

## 2020-10-16 RX ORDER — BREXPIPRAZOLE 2 MG/1
TABLET ORAL
Qty: 30 TABLET | Refills: 1 | OUTPATIENT
Start: 2020-10-16

## 2020-11-30 LAB
ALBUMIN SERPL-MCNC: 4.4 G/DL (ref 3.6–5.1)
ALBUMIN/GLOB SERPL: 1.8 (CALC) (ref 1–2.5)
ALP SERPL-CCNC: 47 U/L (ref 35–144)
ALT SERPL-CCNC: 24 U/L (ref 9–46)
AST SERPL-CCNC: 18 U/L (ref 10–35)
BILIRUB SERPL-MCNC: 0.6 MG/DL (ref 0.2–1.2)
BUN SERPL-MCNC: 21 MG/DL (ref 7–25)
BUN/CREAT SERPL: NORMAL (CALC) (ref 6–22)
CALCIUM SERPL-MCNC: 9.2 MG/DL (ref 8.6–10.3)
CHLORIDE SERPL-SCNC: 105 MMOL/L (ref 98–110)
CHOLEST SERPL-MCNC: 193 MG/DL
CHOLEST/HDLC SERPL: 5.5 (CALC)
CO2 SERPL-SCNC: 27 MMOL/L (ref 20–32)
CREAT SERPL-MCNC: 1.11 MG/DL (ref 0.7–1.33)
GLOBULIN SER CALC-MCNC: 2.5 G/DL (CALC) (ref 1.9–3.7)
GLUCOSE SERPL-MCNC: 91 MG/DL (ref 65–99)
HBA1C MFR BLD: 5.5 % OF TOTAL HGB
HCV AB S/CO SERPL IA: 0.01
HCV AB SERPL QL IA: NORMAL
HDLC SERPL-MCNC: 35 MG/DL
HIV 1+2 AB+HIV1 P24 AG SERPL QL IA: NORMAL
LDLC SERPL CALC-MCNC: 109 MG/DL (CALC)
LDLC SERPL DIRECT ASSAY-MCNC: 108 MG/DL
NONHDLC SERPL-MCNC: 158 MG/DL (CALC)
POTASSIUM SERPL-SCNC: 4.2 MMOL/L (ref 3.5–5.3)
PROT SERPL-MCNC: 6.9 G/DL (ref 6.1–8.1)
SL AMB EGFR AFRICAN AMERICAN: 84 ML/MIN/1.73M2
SL AMB EGFR NON AFRICAN AMERICAN: 73 ML/MIN/1.73M2
SODIUM SERPL-SCNC: 140 MMOL/L (ref 135–146)
TESTOST FREE SERPL-MCNC: 38.5 PG/ML (ref 35–155)
TESTOST SERPL-MCNC: 184 NG/DL (ref 250–1100)
TRIGL SERPL-MCNC: 364 MG/DL
TSH SERPL-ACNC: 3.27 MIU/L (ref 0.4–4.5)

## 2020-12-01 ENCOUNTER — TELEPHONE (OUTPATIENT)
Dept: FAMILY MEDICINE CLINIC | Facility: CLINIC | Age: 60
End: 2020-12-01

## 2020-12-01 ENCOUNTER — TELEMEDICINE (OUTPATIENT)
Dept: FAMILY MEDICINE CLINIC | Facility: CLINIC | Age: 60
End: 2020-12-01
Payer: COMMERCIAL

## 2020-12-01 VITALS
BODY MASS INDEX: 38.34 KG/M2 | TEMPERATURE: 97.6 F | WEIGHT: 253 LBS | SYSTOLIC BLOOD PRESSURE: 138 MMHG | HEART RATE: 80 BPM | HEIGHT: 68 IN | DIASTOLIC BLOOD PRESSURE: 87 MMHG

## 2020-12-01 DIAGNOSIS — J45.909 ASTHMA DUE TO SEASONAL ALLERGIES: ICD-10-CM

## 2020-12-01 DIAGNOSIS — E66.01 CLASS 2 SEVERE OBESITY WITH SERIOUS COMORBIDITY AND BODY MASS INDEX (BMI) OF 38.0 TO 38.9 IN ADULT, UNSPECIFIED OBESITY TYPE (HCC): ICD-10-CM

## 2020-12-01 DIAGNOSIS — R68.82 LOW LIBIDO: ICD-10-CM

## 2020-12-01 DIAGNOSIS — F41.1 GENERALIZED ANXIETY DISORDER: ICD-10-CM

## 2020-12-01 DIAGNOSIS — F31.81 BIPOLAR 2 DISORDER (HCC): ICD-10-CM

## 2020-12-01 DIAGNOSIS — Z86.010 HISTORY OF COLON POLYPS: ICD-10-CM

## 2020-12-01 DIAGNOSIS — R79.89 LOW TESTOSTERONE: ICD-10-CM

## 2020-12-01 DIAGNOSIS — Z99.89 OSA ON CPAP: ICD-10-CM

## 2020-12-01 DIAGNOSIS — Z00.00 MEDICARE ANNUAL WELLNESS VISIT, SUBSEQUENT: Primary | ICD-10-CM

## 2020-12-01 DIAGNOSIS — G47.33 OSA ON CPAP: ICD-10-CM

## 2020-12-01 DIAGNOSIS — E78.2 MIXED HYPERLIPIDEMIA: ICD-10-CM

## 2020-12-01 DIAGNOSIS — I10 BENIGN ESSENTIAL HYPERTENSION: ICD-10-CM

## 2020-12-01 PROCEDURE — 1036F TOBACCO NON-USER: CPT | Performed by: FAMILY MEDICINE

## 2020-12-01 PROCEDURE — 3075F SYST BP GE 130 - 139MM HG: CPT | Performed by: FAMILY MEDICINE

## 2020-12-01 PROCEDURE — 3725F SCREEN DEPRESSION PERFORMED: CPT | Performed by: FAMILY MEDICINE

## 2020-12-01 PROCEDURE — 3008F BODY MASS INDEX DOCD: CPT | Performed by: FAMILY MEDICINE

## 2020-12-01 PROCEDURE — 99214 OFFICE O/P EST MOD 30 MIN: CPT | Performed by: FAMILY MEDICINE

## 2020-12-01 PROCEDURE — 3079F DIAST BP 80-89 MM HG: CPT | Performed by: FAMILY MEDICINE

## 2020-12-01 RX ORDER — ATORVASTATIN CALCIUM 20 MG/1
20 TABLET, FILM COATED ORAL
Qty: 90 TABLET | Refills: 2 | Status: SHIPPED | OUTPATIENT
Start: 2020-12-01 | End: 2021-08-31 | Stop reason: SDUPTHER

## 2020-12-01 RX ORDER — DULOXETIN HYDROCHLORIDE 60 MG/1
60 CAPSULE, DELAYED RELEASE ORAL DAILY
Qty: 30 CAPSULE | Refills: 1 | Status: SHIPPED | OUTPATIENT
Start: 2020-12-01 | End: 2021-01-19 | Stop reason: SDUPTHER

## 2020-12-01 RX ORDER — LISINOPRIL 30 MG/1
30 TABLET ORAL DAILY
Qty: 90 TABLET | Refills: 2 | Status: SHIPPED | OUTPATIENT
Start: 2020-12-01 | End: 2021-08-31 | Stop reason: SDUPTHER

## 2021-01-04 DIAGNOSIS — F41.1 GENERALIZED ANXIETY DISORDER: ICD-10-CM

## 2021-01-04 RX ORDER — DULOXETIN HYDROCHLORIDE 60 MG/1
CAPSULE, DELAYED RELEASE ORAL
Qty: 30 CAPSULE | Refills: 1 | OUTPATIENT
Start: 2021-01-04

## 2021-01-15 NOTE — PROGRESS NOTES
Assessment/Plan:  Problem List Items Addressed This Visit        Respiratory    RUKHSANA on CPAP     Patient is overdue for sleep medicine follow-up  Increased weight gain may be contributing to his fatigue if his CPAP settings are not appropriate  Cardiovascular and Mediastinum    Benign essential hypertension - Primary     Uncontrolled  Check blood pressure outside of office  Recommend lifestyle modifications  Other    Obesity, unspecified     Stable  Recommend lifestyle modifications  Generalized anxiety disorder     Stable  Work stress contributing  Continue Cymbalta 60 mg daily - patient declines dose increase  Continue Rexulti 2 mg nightly  Patient is considering counseling  Smart phone nathaniel list and counseling list provided again today  Discussed possible EAP counseling through employer  Relevant Medications    DULoxetine (CYMBALTA) 60 mg delayed release capsule    Bipolar 2 disorder (HCC)     Stable  Work stress contributing  Continue Cymbalta 60 mg daily - patient declines dose increase  Continue Rexulti 2 mg nightly  Patient is considering counseling  Smart phone nathaniel list and counseling list provided again today  Discussed possible EAP counseling through employer  Relevant Medications    DULoxetine (CYMBALTA) 60 mg delayed release capsule           Return if symptoms worsen or fail to improve  Future Appointments   Date Time Provider Oh Clement   1/19/2021  6:20 PM DO [facility]   4/1/2021  8:00 AM nAna Marie Godwin DO [facility]        Subjective:     Nenita Valdez is a 63 y o  male who presents today for a follow-up on his chronic medical conditions  HPI:  Chief Complaint   Patient presents with    COVID-19    Virtual Regular Visit     -- Above per clinical staff and reviewed   --      HPI      Today:    Return in about 6 weeks (around 1/12/2021) for F/U Anx, BPD, Labs; 4mo - HTN, HL, Anx, BPD, Low T, RUKHSANA, Obesity, Labs       Patient did not complete labs 2020      Patient desires PSA - Last PSA 1 2 on 20  [facility] declines BRITTANY       Low Testosterone - x 2 20 and 20   Low libido  Denies ED  Previously referred to Urology on 20          Obesity - Watching diet - avoid salt in food, but eating increased portions of healthy good   No regular exercise   Previously enjoyed bike riding        HTN - On Lisinopril 30mg QD   No higher dose or other HTN Rx previously   No BP check outside of office  [facility] has an arm BP cuff at home   Stable Echo 10/10/19        Hyperlipidemia - On Lipitor 20mg QHS   No higher doses   Unsure of other statin name that he had tried previously - ineffective   s/p clean cardiac cath 10/4/19        RUKHSANA on CPAP - Uses CPAP regularly   Overdue for Sleep Specialist on [address] - last seen when he got a new CPAP machine in 2018  Last sleep study 2017?, no weight changes in the interim         Bipolar Disease - Feels mood is stable, but more anxious due to work stress  He will need to take a technical test in the future (focusing on precursors now) and is on unspecified review  Feels motivation is low   On Rexulti 2mg QHS - has not been on higher doses   D/C Lamictal due to hives   Poor social supports  [facility] counseling 2018 - helpful   No SI/HI/AH/VH        Anxiety - On increased Cymbalta 60mg QD x 6 weeks   Feels mood is improved despite increased work stressors  Feels 40-50% improved    Has not been on higher doses   Previously on Zoloft - ineffective            PHQ-9 Depression Screening    PHQ-9:   Frequency of the following problems over the past two weeks:      Little interest or pleasure in doing things: 1 - several days  Feeling down, depressed, or hopeless: 1 - several days  Trouble falling or staying asleep, or sleeping too much: 1 - several days  Feeling tired or having little energy: 1 - several days  Poor appetite or overeatin - several days  Feeling bad about yourself - or that you are a failure or have let yourself or your family down: 1 - several days  Trouble concentrating on things, such as reading the newspaper or watching television: 0 - not at all  Moving or speaking so slowly that other people could have noticed  Or the opposite - being so fidgety or restless that you have been moving around a lot more than usual: 1 - several days  Thoughts that you would be better off dead, or of hurting yourself in some way: 0 - not at all  PHQ-2 Score: 2  PHQ-9 Score: 7         VENANCIO-7 Flowsheet Screening      Most Recent Value   Over the last two weeks, how often have you been bothered by the following problems? Feeling nervous, anxious, or on edge  3   Not being able to stop or control worrying  3   Worrying too much about different things  3   Trouble relaxing   0   Being so restless that it's hard to sit still  0   Becoming easily annoyed or irritable   0   Feeling afraid as if something awful might happen  1   How difficult have these problems made it for you to do your work, take care of things at home, or get along with other people? Not difficult at all   VENANCIO Score   10        MDQ:  3               From previous note:    Return in about 6 weeks (around 11/26/2020) for Recheck HTN, HL, Anx, BPD, Low T, RUKHSANA, Obesity, Labs      Wife in background of video visit           Patient desires PSA - Last PSA 1 2 on 6/29/20  [facility] declines BRITTANY       Low Testosterone - x 2 6/29/20 and 11/25/20   Low libido    Denies ED        Obesity - Watching diet - avoid salt in food, but eating increased portions of healthy good   No regular exercise   Previously enjoyed bike riding        HTN - On Lisinopril 30mg QD   No higher dose or other HTN Rx previously   No BP check outside of office  [facility] has an arm BP cuff at home   Stable Echo 10/10/19        Hyperlipidemia - On Lipitor 20mg QHS   No higher doses   Unsure of other statin name that he had tried previously - ineffective   s/p clean cardiac cath 10/4/19        RUKHSANA on CPAP - Uses CPAP regularly   Overdue for Sleep Specialist on [address] - last seen when he got a new CPAP machine in 2018  Last sleep study 2017?, no weight changes in the interim         Bipolar Disease - Feels mood is stable, but more anxious due to worse stress  He will need to take a technical test and is on a 60-day review  Feels motivation is low   On Rexulti 2mg QHS - has not been on higher doses   D/C Lamictal due to hives   Poor social supports  [facility] - helpful   No SI/HI/AH/VH        Anxiety - On Cymbalta 30mg QD   Has not been on higher doses   Previously on Zoloft - ineffective              PHQ-9 Depression Screening    PHQ-9:   Frequency of the following problems over the past two weeks:      Little interest or pleasure in doing things: 0 - not at all  Feeling down, depressed, or hopeless: 1 - several days  PHQ-2 Score: 1                VENANCIO-7 Flowsheet Screening      Most Recent Value   Over the last two weeks, how often have you been bothered by the following problems? Feeling nervous, anxious, or on edge  3   Not being able to stop or control worrying  1   Worrying too much about different things  0   Trouble relaxing   0   Being so restless that it's hard to sit still  0   Becoming easily annoyed or irritable   0   Feeling afraid as if something awful might happen  1   How difficult have these problems made it for you to do your work, take care of things at home, or get along with other people?    Not difficult at all   VENANCIO Score   5          MDQ:  2                 From previous note:     Controlled Substance Review     [place] or [place]  reviewed: No red flags were identified; safe to proceed with prescription       Patient desires PSA - Last PSA 1 2 on 6/29/20  Luc Funes declines BRITTANY       Low Testosterone - x 1 6/29/20   Low libido          Obesity - Watching diet - avoid salt in food, but eating increased portions of healthy good   No regular exercise   Previously enjoyed bike riding        HTN - On Lisinopril 30mg QD   No higher dose or other HTN Rx previously   No BP check outside of office  Luc Funes has an arm BP cuff at home   Stable Echo 10/10/19        Hyperlipidemia - On Lipitor 20mg QHS   No higher doses   Unsure of other statin name that he had tried previously - ineffective   s/p clean cardiac cath 10/4/19        RUKHSANA on CPAP - Uses CPAP regularly   Overdue for Sleep Specialist on [address] - last seen when he got a new CPAP machine in 2018        Asthma - Triggered by allergies   Uses Albuterol HFA PRN and Breo 100-25 1 puff WD PRN   Last use 3/20   No ICS previously  Severo craig  per Asthma / Henok Joseph for appoint        Bipolar Disease - Feels mood is stable, manic the past few days Re:  Spending money, under better control   Feels motivation is low   On Rexulti 2mg QHS - has not been on higher doses   D/C Lamictal due to hives   Poor social supports  Severo Paige counseling 2018 - helpful   No SI/HI/AH/VH        Anxiety - On Cymbalta 30mg QD   Has not been on higher doses   Previously on Zoloft - ineffective           PHQ-9 Depression Screening    PHQ-9:    Frequency of the following problems over the past two weeks:       Little interest or pleasure in doing things:  0 - not at all  Feeling down, depressed, or hopeless:  0 - not at all  Trouble falling or staying asleep, or sleeping too much:  0 - not at all  Feeling tired or having little energy:  1 - several days  Poor appetite or overeatin - more than half the days  Feeling bad about yourself - or that you are a failure or have let yourself or your family down:  0 - not at all  Trouble concentrating on things, such as reading the newspaper or watching television:  0 - not at all  Moving or speaking so slowly that other people could have noticed   Or the opposite - being so fidgety or restless that you have been moving around a lot more than usual:  0 - not at all  Thoughts that you would be better off dead, or of hurting yourself in some way:  0 - not at all  PHQ-2 Score:  0  PHQ-9 Score:  3               VENANCIO-7 Flowsheet Screening      Most Recent Value   Over the last two weeks, how often have you been bothered by the following problems? Feeling nervous, anxious, or on edge  0   Not being able to stop or control worrying  0   Worrying too much about different things  0   Trouble relaxing   0   Being so restless that it's hard to sit still  0   Becoming easily annoyed or irritable   0   Feeling afraid as if something awful might happen  0   How difficult have these problems made it for you to do your work, take care of things at home, or get along with other people?   Not difficult at all   VENANCIO Score   0          MDQ:  10, Minor Problem     Soledad's Syndrome - x 2   S/p Antibiotics   Never followed c Rheum        H/O Colon Polyps - Colonoscopy per [facility] 1411 Dr Harish Hull of next colonoscopy        Reviewed:  Labs 11/25/20     Last Uro in [place], [place] c Soledad's Flare   S/p cysto   Last prostate exam years ago = 2017 c colonoscopy - BPH        Sees Dentist q6 months  Overdue for Optho               The following portions of the patient's history were reviewed and updated as appropriate: allergies, current medications, past family history, past medical history, past social history, past surgical history and problem list       Review of Systems   Constitutional: Positive for fatigue  Negative for appetite change, chills, diaphoresis and fever  Respiratory: Negative for chest tightness and shortness of breath  Cardiovascular: Negative for chest pain  Gastrointestinal: Negative for abdominal pain, blood in stool, diarrhea, nausea and vomiting  Genitourinary: Negative for dysuria          Current Outpatient Medications   Medication Sig Dispense Refill    albuterol (PROVENTIL HFA,VENTOLIN HFA) 90 mcg/act inhaler Inhale 2 puffs every 6 (six) hours as needed for wheezing 1 Inhaler 0    aspirin (ECOTRIN LOW STRENGTH) 81 mg EC tablet Take 81 mg by mouth daily      atorvastatin (LIPITOR) 20 mg tablet Take 1 tablet (20 mg total) by mouth daily at bedtime 90 tablet 2    Brexpiprazole (Rexulti) 2 MG tablet Take 1 tablet (2 mg total) by mouth daily at bedtime 90 tablet 2    cholecalciferol (VITAMIN D3) 1,000 units tablet Take 1,000 Units by mouth daily      DULoxetine (CYMBALTA) 60 mg delayed release capsule Take 1 capsule (60 mg total) by mouth daily 90 capsule 2    fluticasone (FLONASE) 50 mcg/act nasal spray 1 spray into each nostril daily 1 Bottle 0    fluticasone-vilanterol (Breo Ellipta) 100-25 mcg/inh inhaler Inhale 1 puff daily as needed Rinse mouth after use   lisinopril (ZESTRIL) 30 mg tablet Take 1 tablet (30 mg total) by mouth daily 90 tablet 2     No current facility-administered medications for this visit  Objective:  /89 Comment: Per Patient  Pulse 84   Temp 97 7 °F (36 5 °C)   Ht 5' 7 64" (1 718 m)   Wt 115 kg (253 lb)   BMI 38 88 kg/m²    Wt Readings from Last 3 Encounters:   01/19/21 115 kg (253 lb)   12/01/20 115 kg (253 lb)   10/15/20 114 kg (252 lb 6 4 oz)      BP Readings from Last 3 Encounters:   01/19/21 144/89   12/01/20 138/87   10/15/20 136/72          Physical Exam  Vitals signs and nursing note reviewed  Constitutional:       General: He is not in acute distress  Appearance: Normal appearance  He is well-developed  He is obese  He is not diaphoretic  HENT:      Head: Normocephalic and atraumatic  Right Ear: External ear normal       Left Ear: External ear normal       Nose: Nose normal    Eyes:      General: No scleral icterus  Right eye: No discharge  Left eye: No discharge  Extraocular Movements: Extraocular movements intact  Conjunctiva/sclera: Conjunctivae normal    Neck:      Musculoskeletal: Normal range of motion  Thyroid: No thyromegaly     Cardiovascular:      Rate and Rhythm: Normal rate  Pulmonary:      Effort: Pulmonary effort is normal  No respiratory distress  Breath sounds: No stridor  No wheezing  Musculoskeletal:         General: No swelling  Right lower leg: No edema  Left lower leg: No edema  Lymphadenopathy:      Cervical: No cervical adenopathy  Skin:     Findings: No rash  Neurological:      General: No focal deficit present  Mental Status: He is alert and oriented to person, place, and time  Psychiatric:         Attention and Perception: Attention and perception normal          Mood and Affect: Mood is depressed  Speech: Speech normal          Behavior: Behavior normal  Behavior is cooperative  Thought Content: Thought content normal          Cognition and Memory: Cognition and memory normal          Judgment: Judgment normal          Lab Results:      Lab Results   Component Value Date    WBC 5 7 10/01/2019    HGB 14 5 10/01/2019    HCT 43 8 10/01/2019     10/01/2019    TRIG 364 (H) 11/25/2020    HDL 35 (L) 11/25/2020    LDLDIRECT 108 (H) 11/25/2020    ALT 24 11/25/2020    AST 18 11/25/2020    K 4 2 11/25/2020     11/25/2020    CREATININE 1 11 11/25/2020    BUN 21 11/25/2020    CO2 27 11/25/2020    INR 1 0 10/01/2019    HGBA1C 5 5 11/25/2020     No results found for: URICACID  Invalid input(s): BASENAME Vitamin D    No results found  POCT Labs      BMI Counseling: Body mass index is 38 88 kg/m²  The BMI is above normal  Nutrition recommendations include encouraging healthy choices of fruits and vegetables  Exercise recommendations include exercising 3-5 times per week  No pharmacotherapy was ordered

## 2021-01-19 ENCOUNTER — TELEMEDICINE (OUTPATIENT)
Dept: FAMILY MEDICINE CLINIC | Facility: CLINIC | Age: 61
End: 2021-01-19
Payer: COMMERCIAL

## 2021-01-19 VITALS
SYSTOLIC BLOOD PRESSURE: 144 MMHG | HEART RATE: 84 BPM | HEIGHT: 68 IN | WEIGHT: 253 LBS | DIASTOLIC BLOOD PRESSURE: 89 MMHG | BODY MASS INDEX: 38.34 KG/M2 | TEMPERATURE: 97.7 F

## 2021-01-19 DIAGNOSIS — Z99.89 OSA ON CPAP: ICD-10-CM

## 2021-01-19 DIAGNOSIS — E66.01 CLASS 2 SEVERE OBESITY WITH SERIOUS COMORBIDITY AND BODY MASS INDEX (BMI) OF 38.0 TO 38.9 IN ADULT, UNSPECIFIED OBESITY TYPE (HCC): ICD-10-CM

## 2021-01-19 DIAGNOSIS — F41.1 GENERALIZED ANXIETY DISORDER: ICD-10-CM

## 2021-01-19 DIAGNOSIS — F31.81 BIPOLAR 2 DISORDER (HCC): ICD-10-CM

## 2021-01-19 DIAGNOSIS — I10 BENIGN ESSENTIAL HYPERTENSION: Primary | ICD-10-CM

## 2021-01-19 DIAGNOSIS — G47.33 OSA ON CPAP: ICD-10-CM

## 2021-01-19 PROCEDURE — 1036F TOBACCO NON-USER: CPT | Performed by: FAMILY MEDICINE

## 2021-01-19 PROCEDURE — 3077F SYST BP >= 140 MM HG: CPT | Performed by: FAMILY MEDICINE

## 2021-01-19 PROCEDURE — 3008F BODY MASS INDEX DOCD: CPT | Performed by: FAMILY MEDICINE

## 2021-01-19 PROCEDURE — 3079F DIAST BP 80-89 MM HG: CPT | Performed by: FAMILY MEDICINE

## 2021-01-19 PROCEDURE — 99214 OFFICE O/P EST MOD 30 MIN: CPT | Performed by: FAMILY MEDICINE

## 2021-01-19 PROCEDURE — 3725F SCREEN DEPRESSION PERFORMED: CPT | Performed by: FAMILY MEDICINE

## 2021-01-19 RX ORDER — DULOXETIN HYDROCHLORIDE 60 MG/1
60 CAPSULE, DELAYED RELEASE ORAL DAILY
Qty: 90 CAPSULE | Refills: 2 | Status: SHIPPED | OUTPATIENT
Start: 2021-01-19 | End: 2021-04-13

## 2021-01-19 NOTE — ASSESSMENT & PLAN NOTE
Stable  Work stress contributing  Continue Cymbalta 60 mg daily - patient declines dose increase  Continue Rexulti 2 mg nightly  Patient is considering counseling  Smart phone nathaniel list and counseling list provided again today  Discussed possible EAP counseling through employer

## 2021-01-19 NOTE — PATIENT INSTRUCTIONS
Apps and Websites for Counseling, Anxiety/Depression, Chronic Pain, Lifestyle Change, Problem-solving, Self-Esteem, Anger Management, Coping with Uncertainty    (Prices current as of 9/5/19)    1  Mood Kit - Available in [employer] Krysten Store for $4 99  Supports a person's success in specific situations, includes thought , mood tracker, and journal   Can be accessed in an unstructured way and used as an unguided self-help krysten  2   Moodnotes - Available in [employer] K[employer] Store for $4 99  Focuses on healthier thinking habits and identifying "traps" in thinking  Tracks mood over a period of time and identifies factors that influence mood  3   MoodMission - Available in [facility] for free  Includes five "missions" to promote confidence in handling stressors and coping in specific situations  The krysten personalizes its style and techniques based on when the user engages it most frequently  In-krysten rewards are used to motivate, increase fun and self-confidence  Helpful for patients who need improvement in mood or a decrease in anxiety and depression symptoms  4    What's Up - Available in [employer] for free  Recognizes negative thinking patterns and methods to overcome them  Uses helpful metaphors, a catastrophe scale, grounding techniques, and breathing exercises  Has the ability to sync data across multiple devices and protect this information with a unique pass code  Has the capability to be active in forums where people can discuss similar feelings and strategies that have been helpful  5   Moodpath - Available in [employer] for free  Uses daily screenings to create a better understanding of thoughts, feelings, and emotions  When needed, it provides a discussion guide to talking with a medical professional based on the responses to daily screenings    Includes over 150 psychological exercises and videos to promote and strengthen overall mental health  6   MindShift - Available in [place]  for free  Helpful for youth and young adults in coping with anxiety  Creates an individualized toolbox to help users deal with test anxiety, perfectionism, social anxiety, worry, panic, and conflict  Includes directions on how to construct belief experiments to trial common beliefs that feed anxiety, guided relaxation, as well as tools and tips to help establish and accomplish goals  Differentiates between helpful and unhelpful anxiety, and explains how to overcome fears by gradually facing them in manageable steps  7   CBT-I  - Available in [place]  for free  For insomnia  Educates about sleep, developing positive sleep routines, and improved sleep environment  Encourages user to change behaviors which will provide confidence for better sleep on a regular basis  8   [employer] - Free website  Provides self-help and therapy resources that encourages change to combat negative and destructive thought patterns  Includes access to numerous handouts on a variety of symptoms related to anxiety, depression, low self-esteem, panic attacks, social disorders, and more  The solution section has interventions that can be printed and saved for future use  9   Woebot - Available in [place]  for free  Recommended for age 16+  Friendly self-care  that helps you think through situations with step-by-step guidance using counseling tools, learn about yourself with intelligent mood tracking, and master skills to reduce stress and live happier through 100+ evidence-based stories from clinicians  Chat as often or as little as you'd like, whenever you'd like to  10   Madeline CONLEY  CBT DBT Chatbot - Available in Apple appsstore and Google Play for free, has in-nathaniel purchases  Recommended for 12+  Psychologist support at $30/month, 50% off to start    Felicita Holcomb / chatbot is free  Uses techniques of CBT, DBT, yoga, and meditation to support your needs regarding stress, anxiety, sleep, loss, and a full range of other mental health and wellness issues  11   Curable Pain Relief - Available in [employer] Krysten store and Google Play for free, has in-krysten purchases  Teaches about the chronic pain cycle and how to reverse it, while retraining your brain and nervous system for long-term results  Smart  Antonella guides user through updates in pain science to identify, target, eliminate the factors that are keeping user "stuck" in the pain cycle  12   [employer] Online Therapy - Available in Apple Krysten store and Google Play for a fee  Subscription service, starting at $59/week (billed monthly)  All plans include unlimited messaging, and add live video sessions if desired  Unlimited messaging therapy for teens ages 15-14 and special services for couples therapy  You can change therapists or stop subscription renewal at any time  Recommended for 13+  User is matched with a licensed therapist in your state and communicate on your device by text, audio, and video from anywhere, at any time  User will hear back at least once a day, 5 days per week  Refer to the back of insurance card for counseling options  Counseling services:    [facility]   [address] [facility], [address] [place] [address], [facility], [address] (they have equine therapy too)  [address] [place] [address],  [facility], [address] [address], [address],  [facility], [address]    [address] [address]- [phone]     [address] [place] [address], [address]  [facility] [address]    [address] [facility]   [address], [place], [address]     [phone]     [facility]   [address] [facility], [facility], [address]     Munira Garb, MSW, LSW  (patients age 11-adult)   [address], [address] [facility], 243 [facility] [address] [facility] [address] [facility] [address], [address], [address]   [facility], [address]  [address] [facility], [address] [facility] [address] [facility], [facility], [place], [address]  [address] [facility] [address] (specializing in addiction)  [facility], [address] [facility], [address]  1441 [facility]  [address] [facility], [place], [address] [facility]   [place]      [address], [address] [facility] , [address], [place], [address], [place], [facility], [address] [facility], [address], [place], [address]  [address] [address], [address] [facility], [address] [facility], [address]   [phone]    Reginia [place], [address] [facility] [address]  [place] [place] [id number] [phone]      Hotlines:   Please program these numbers into your phone in case you or someone you know needs them  All services are free  WarmLine:  [phone] or [phone]   They provide a supportive listening ear if you need it  They also can also provide information about mental health concerns and services  Crisis Line:  [facility] [phone],  [facility] [phone], [address] [place] [address]: [phone]   24/7 crisis counseling, on-site counseling and walk-in counseling services available  National Suicide Prevention Lifeline:  4-990-165-425.755.2952  [address] [facility] [phone]   This is free, confidential, and available 24/7  [place]: [phone]   For those facing or having survived abuse 24 hour confidential help line, emergency safe house, counseling, advocacy and education    Crisis Text Line: text 730441     You are connected to a Crisis Counselor to bring a hot moment to a cool calm through active listening and collaborative problem solving  If you or someone your know are feeling as though you are going to hurt yourself, do not wait - GET HELP RIGHT AWAY  Go to the closest Emergency Room, call 911, or call someone you trust, family member or friend to be with you until you can get some help

## 2021-01-19 NOTE — ASSESSMENT & PLAN NOTE
Patient is overdue for sleep medicine follow-up  Increased weight gain may be contributing to his fatigue if his CPAP settings are not appropriate

## 2021-03-30 DIAGNOSIS — Z23 ENCOUNTER FOR IMMUNIZATION: ICD-10-CM

## 2021-03-31 LAB
ALBUMIN SERPL-MCNC: 4.2 G/DL (ref 3.6–5.1)
ALBUMIN/GLOB SERPL: 1.8 (CALC) (ref 1–2.5)
ALP SERPL-CCNC: 45 U/L (ref 35–144)
ALT SERPL-CCNC: 24 U/L (ref 9–46)
AST SERPL-CCNC: 16 U/L (ref 10–35)
BILIRUB SERPL-MCNC: 0.5 MG/DL (ref 0.2–1.2)
BUN SERPL-MCNC: 22 MG/DL (ref 7–25)
BUN/CREAT SERPL: NORMAL (CALC) (ref 6–22)
CALCIUM SERPL-MCNC: 9.3 MG/DL (ref 8.6–10.3)
CHLORIDE SERPL-SCNC: 106 MMOL/L (ref 98–110)
CHOLEST SERPL-MCNC: 198 MG/DL
CHOLEST/HDLC SERPL: 5.4 (CALC)
CO2 SERPL-SCNC: 28 MMOL/L (ref 20–32)
CREAT SERPL-MCNC: 1.18 MG/DL (ref 0.7–1.33)
GLOBULIN SER CALC-MCNC: 2.3 G/DL (CALC) (ref 1.9–3.7)
GLUCOSE SERPL-MCNC: 95 MG/DL (ref 65–99)
HDLC SERPL-MCNC: 37 MG/DL
LDLC SERPL CALC-MCNC: 114 MG/DL (CALC)
LDLC SERPL DIRECT ASSAY-MCNC: 113 MG/DL
NONHDLC SERPL-MCNC: 161 MG/DL (CALC)
POTASSIUM SERPL-SCNC: 4.2 MMOL/L (ref 3.5–5.3)
PROT SERPL-MCNC: 6.5 G/DL (ref 6.1–8.1)
SL AMB EGFR AFRICAN AMERICAN: 78 ML/MIN/1.73M2
SL AMB EGFR NON AFRICAN AMERICAN: 67 ML/MIN/1.73M2
SODIUM SERPL-SCNC: 141 MMOL/L (ref 135–146)
TRIGL SERPL-MCNC: 327 MG/DL
TSH SERPL-ACNC: 2.75 MIU/L (ref 0.4–4.5)

## 2021-03-31 NOTE — RESULT ENCOUNTER NOTE
Unstable labs - will review with patient at upcoming appointment  Hyperlipidemia / Hypertriglyceridemia - Recommend lifestyle modifications  To consider Fish Oil 4mg daily ?

## 2021-04-13 ENCOUNTER — OFFICE VISIT (OUTPATIENT)
Dept: FAMILY MEDICINE CLINIC | Facility: CLINIC | Age: 61
End: 2021-04-13
Payer: COMMERCIAL

## 2021-04-13 VITALS
DIASTOLIC BLOOD PRESSURE: 80 MMHG | BODY MASS INDEX: 40.34 KG/M2 | SYSTOLIC BLOOD PRESSURE: 122 MMHG | HEART RATE: 93 BPM | TEMPERATURE: 98 F | WEIGHT: 257 LBS | HEIGHT: 67 IN | OXYGEN SATURATION: 98 % | RESPIRATION RATE: 17 BRPM

## 2021-04-13 DIAGNOSIS — R79.89 LOW TESTOSTERONE: ICD-10-CM

## 2021-04-13 DIAGNOSIS — Z12.5 PROSTATE CANCER SCREENING: ICD-10-CM

## 2021-04-13 DIAGNOSIS — J45.909 ASTHMA DUE TO SEASONAL ALLERGIES: ICD-10-CM

## 2021-04-13 DIAGNOSIS — E78.2 MIXED HYPERLIPIDEMIA: ICD-10-CM

## 2021-04-13 DIAGNOSIS — I10 BENIGN ESSENTIAL HYPERTENSION: Primary | ICD-10-CM

## 2021-04-13 DIAGNOSIS — Z99.89 OSA ON CPAP: ICD-10-CM

## 2021-04-13 DIAGNOSIS — F31.81 BIPOLAR 2 DISORDER (HCC): ICD-10-CM

## 2021-04-13 DIAGNOSIS — G47.33 OSA ON CPAP: ICD-10-CM

## 2021-04-13 DIAGNOSIS — Z86.010 HISTORY OF COLON POLYPS: ICD-10-CM

## 2021-04-13 DIAGNOSIS — F41.1 GENERALIZED ANXIETY DISORDER: ICD-10-CM

## 2021-04-13 DIAGNOSIS — E66.01 MORBID OBESITY (HCC): ICD-10-CM

## 2021-04-13 PROCEDURE — 3074F SYST BP LT 130 MM HG: CPT | Performed by: FAMILY MEDICINE

## 2021-04-13 PROCEDURE — 3008F BODY MASS INDEX DOCD: CPT | Performed by: FAMILY MEDICINE

## 2021-04-13 PROCEDURE — 3079F DIAST BP 80-89 MM HG: CPT | Performed by: FAMILY MEDICINE

## 2021-04-13 PROCEDURE — 1036F TOBACCO NON-USER: CPT | Performed by: FAMILY MEDICINE

## 2021-04-13 PROCEDURE — 3725F SCREEN DEPRESSION PERFORMED: CPT | Performed by: FAMILY MEDICINE

## 2021-04-13 PROCEDURE — 99214 OFFICE O/P EST MOD 30 MIN: CPT | Performed by: FAMILY MEDICINE

## 2021-04-13 RX ORDER — DULOXETIN HYDROCHLORIDE 60 MG/1
60 CAPSULE, DELAYED RELEASE ORAL DAILY
Qty: 90 CAPSULE | Refills: 2
Start: 2021-04-13 | End: 2021-10-19 | Stop reason: SDUPTHER

## 2021-04-13 RX ORDER — DULOXETIN HYDROCHLORIDE 30 MG/1
30 CAPSULE, DELAYED RELEASE ORAL DAILY
Qty: 30 CAPSULE | Refills: 1 | Status: SHIPPED | OUTPATIENT
Start: 2021-04-13 | End: 2021-06-08 | Stop reason: SDUPTHER

## 2021-04-13 NOTE — ASSESSMENT & PLAN NOTE
Uncontrolled  Work stress contributing  Increase Cymbalta 90mg daily  Continue Rexulti 2 mg nightly  Patient is considering counseling  Smart phone nathaniel list and counseling list provided again today  Previously discussed possible EAP counseling through employer

## 2021-04-13 NOTE — PATIENT INSTRUCTIONS
Apps and Websites for Counseling, Anxiety/Depression, Chronic Pain, Lifestyle Change, Problem-solving, Self-Esteem, Anger Management, Coping with Uncertainty    (Prices current as of 9/5/19)    1  Mood Kit - Available in [employer] Krysten Store for $4 99  Supports a person's success in specific situations, includes thought , mood tracker, and journal   Can be accessed in an unstructured way and used as an unguided self-help krysten  2   Moodnotes - Available in [employer] Store for $4 99  Focuses on healthier thinking habits and identifying "traps" in thinking  Tracks mood over a period of time and identifies factors that influence mood  3   MoodMission - Available in [facility] for free  Includes five "missions" to promote confidence in handling stressors and coping in specific situations  The krysten personalizes its style and techniques based on when the user engages it most frequently  In-krysten rewards are used to motivate, increase fun and self-confidence  Helpful for patients who need improvement in mood or a decrease in anxiety and depression symptoms  4    What's Up - Available in [employer] for free  Recognizes negative thinking patterns and methods to overcome them  Uses helpful metaphors, a catastrophe scale, grounding techniques, and breathing exercises  Has the ability to sync data across multiple devices and protect this information with a unique pass code  Has the capability to be active in forums where people can discuss similar feelings and strategies that have been helpful  5   Moodpath - Available in [employer] for free  Uses daily screenings to create a better understanding of thoughts, feelings, and emotions  When needed, it provides a discussion guide to talking with a medical professional based on the responses to daily screenings    Includes over 150 psychological exercises and videos to promote and strengthen overall mental health  6   MindShift - Available in [employer] for free  Helpful for youth and young adults in coping with anxiety  Creates an individualized toolbox to help users deal with test anxiety, perfectionism, social anxiety, worry, panic, and conflict  Includes directions on how to construct belief experiments to trial common beliefs that feed anxiety, guided relaxation, as well as tools and tips to help establish and accomplish goals  Differentiates between helpful and unhelpful anxiety, and explains how to overcome fears by gradually facing them in manageable steps  7   CBT-I  - Available in [employer] for free  For insomnia  Educates about sleep, developing positive sleep routines, and improved sleep environment  Encourages user to change behaviors which will provide confidence for better sleep on a regular basis  8   SumoSkinny uk - Free website  Provides self-help and therapy resources that encourages change to combat negative and destructive thought patterns  Includes access to numerous handouts on a variety of symptoms related to anxiety, depression, low self-esteem, panic attacks, social disorders, and more  The solution section has interventions that can be printed and saved for future use  9   Woebot - Available in [employer] for free  Recommended for age 16+  Friendly self-care  that helps you think through situations with step-by-step guidance using counseling tools, learn about yourself with intelligent mood tracking, and master skills to reduce stress and live happier through 100+ evidence-based stories from clinicians  Chat as often or as little as you'd like, whenever you'd like to  10   Laura:  JARVIS  CBT DBT Chatbot - Available in Apple appsstore and Google Play for free, has in-nathaniel purchases  Recommended for 12+  Psychologist support at $30/month, 50% off to start    Miles Peralta / chatbot is free  Uses techniques of CBT, DBT, yoga, and meditation to support your needs regarding stress, anxiety, sleep, loss, and a full range of other mental health and wellness issues  11   Curable Pain Relief - Available in Apple Nathaniel store and Google Play for free, has in-nathaniel purchases  Teaches about the chronic pain cycle and how to reverse it, while retraining your brain and nervous system for long-term results  Smart  Antonella guides user through updates in pain science to identify, target, eliminate the factors that are keeping user "stuck" in the pain cycle  12   Talks[employer] Online Therapy - Available in Apple Nathaniel store and Google Play for a fee  Subscription service, starting at $59/week (billed monthly)  All plans include unlimited messaging, and add live video sessions if desired  Unlimited messaging therapy for teens ages 15-14 and special services for couples therapy  You can change therapists or stop subscription renewal at any time  Recommended for 13+  User is matched with a licensed therapist in your state and communicate on your device by text, audio, and video from anywhere, at any time  User will hear back at least once a day, 5 days per week  Refer to the back of insurance card for counseling options  Counseling services:    [facility]   82 [facility], [address] [place] [address], [facility], [address] (they have equine therapy too)  [address] [address],  [facility], [address] [address], [address],  [address] [facility], [address]  [facility]  60    [address] [phone]     [address] [place] [address], [address]  [facility]    162 [facility]   [address], [place], [address]     [phone]     [facility]   4384 [facility], [place], [address]     Demetrio Chun, MSW, LSW  (patients age 11-adult)   [address], 243 [facility], [address]   [facility], [address]  1000 [facility], 1777 [facility] [address] [facility] [place], [facility], [place], [address]  [facility] 108 (specializing in addiction)  [facility], [address]  1441 [facility]  291 [facility], [place], 6100 [facility]   [place]      [address], 403 [facility] , [address], [place], [address], [place], 9599 [facility], [address] [place] [address]  100 [facility], [address], [place], [address]  [address] [address], 2601 [facility], [address]   [phone]    Jovon Mullen, [address] [facility] [address]  [place] [place] [address] [phone]      Hotlines:   Please program these numbers into your phone in case you or someone you know needs them  All services are free  WarmLine:  673.860.1205 or [phone]   They provide a supportive listening ear if you need it  They also can also provide information about mental health concerns and services  Crisis Line:  [facility] [phone],  [facility] [phone], [address] [place] [address]: [phone]   24/7 crisis counseling, on-site counseling and walk-in counseling services available  National Suicide Prevention Lifeline:  1-444.973.8911  En 1200 [facility] [phone]   This is free, confidential, and available 24/7  [place]: [phone]   For those facing or having survived abuse 24 hour confidential help line, emergency safe house, counseling, advocacy and education    Crisis Text Line: text 654672     You are connected to a Crisis Counselor to bring a hot moment to a cool calm through active listening and collaborative problem solving  If you or someone your know are feeling as though you are going to hurt yourself, do not wait - GET HELP RIGHT AWAY  Go to the closest Emergency Room, call 911, or call someone you trust, family member or friend to be with you until you can get some help

## 2021-04-13 NOTE — PROGRESS NOTES
Assessment/Plan:  Problem List Items Addressed This Visit        Respiratory    RUKHSANA on CPAP     Patient is overdue for sleep medicine follow-up  Increased weight gain may be contributing to his fatigue if his CPAP settings are not appropriate  Asthma due to seasonal allergies     Stable  Management per asthma/allergy-overdue for appointment  Cardiovascular and Mediastinum    Benign essential hypertension - Primary     Stable  Check blood pressure outside of office  Recommend lifestyle modifications  Relevant Orders    CBC and differential    Comprehensive metabolic panel       Other    Mixed hyperlipidemia     Stable  Continue Lipitor 20 mg nightly  Recommend lifestyle modifications  Relevant Orders    CBC and differential    Comprehensive metabolic panel    Lipid panel    TSH, 3rd generation with Free T4 reflex    LDL cholesterol, direct    Generalized anxiety disorder     Uncontrolled  Work stress contributing  Increase Cymbalta 90mg daily  Continue Rexulti 2 mg nightly  Patient is considering counseling  Smart phone nathaniel list and counseling list provided again today  Previously discussed possible EAP counseling through employer  Relevant Medications    DULoxetine (CYMBALTA) 30 mg delayed release capsule    DULoxetine (CYMBALTA) 60 mg delayed release capsule    Other Relevant Orders    Comprehensive metabolic panel    TSH, 3rd generation with Free T4 reflex    Bipolar 2 disorder ([facility] [address] )     Uncontrolled  Work stress contributing  Increase Cymbalta 90mg daily  Continue Rexulti 2 mg nightly  Patient is considering counseling  Smart phone nathaniel list and counseling list provided again today  Previously discussed possible EAP counseling through employer             Relevant Medications    DULoxetine (CYMBALTA) 30 mg delayed release capsule    DULoxetine (CYMBALTA) 60 mg delayed release capsule    Other Relevant Orders    Comprehensive metabolic panel TSH, 3rd generation with Free T4 reflex    Morbid obesity (HCC)     Worsening  Recommend lifestyle modifications  Relevant Orders    TSH, 3rd generation with Free T4 reflex    History of colon polyps     Colonoscopy up to date  Other Visit Diagnoses     Low testosterone        May be contributing to fatigue  Patient previously referred to Urology  Prostate cancer screening        Relevant Orders    PSA, Total Screen           Return in about 6 weeks (around 5/25/2021) for F/U HTN, Anx, BPD, Labs; 4mo - HTN, HL, Anx, BPD, Low T, RUKHSANA, Obesity, Labs  Future Appointments   Date Time Provider Oh Clement   6/8/2021  6:00 PM Tiffanie Nielsen DO [facility]        Subjective:     Estela Connolly is a 63 y o  male who presents today for a follow-up on his chronic medical conditions  HPI:  Chief Complaint   Patient presents with    Follow-up     -- Above per clinical staff and reviewed  --      HPI      Today:      4mo - HTN, HL, Anx, BPD, Low T, RUKHSANA, Obesity, Labs       Patient desires PSA - Last PSA 1 2 on 6/29/20  [facility] declines BRITTANY       Low Testosterone - x 2 6/29/20 and 11/25/20   Low libido   Denies ED  Previously referred to Urology on 12/1/20          Obesity - Watching diet - avoiding salt in food, but eating increased portions of healthy good   No regular exercise   Occasionally walking with wife  Previously enjoyed bike riding        HTN - On Lisinopril 30mg QD   No higher dose or other HTN Rx previously   No BP check outside of office  [facility] has an arm BP cuff at home   Stable Echo 10/10/19        Hyperlipidemia - On Lipitor 20mg QHS   No higher doses   Unsure of other statin name that he had tried previously - ineffective   s/p clean cardiac cath 10/4/19        RUKHSANA on CPAP - Uses CPAP regularly   Overdue for Sleep Specialist on [address] - last seen when he got a new CPAP machine in 2018   Last sleep study 2017?, no weight changes in the interim          Bipolar Disease - Feels mood is "BLAH" for months, he attributes to work Jason Ramirez will need to take a technical test in the future, which will continue in the future to maintain his job status  Feels motivation is low   On Rexulti 2mg QHS - has not been on higher doses   D/C Lamictal due to hives   Good social supports  [facility] 2018 - helpful   No SI/HI/AH/VH        Anxiety - On increased Cymbalta 60mg QD   He feels drained by his anxiety due to increased work stressors  Feels 40-50% improved  Has not been on higher doses   Previously on Zoloft - ineffective          PHQ-9 Depression Screening    PHQ-9:   Frequency of the following problems over the past two weeks:      Little interest or pleasure in doing things: 2 - more than half the days  Feeling down, depressed, or hopeless: 2 - more than half the days  Trouble falling or staying asleep, or sleeping too much: 0 - not at all  Feeling tired or having little energy: 3 - nearly every day  Poor appetite or overeatin - more than half the days  Feeling bad about yourself - or that you are a failure or have let yourself or your family down: 1 - several days  Trouble concentrating on things, such as reading the newspaper or watching television: 2 - more than half the days  Moving or speaking so slowly that other people could have noticed  Or the opposite - being so fidgety or restless that you have been moving around a lot more than usual: 0 - not at all  Thoughts that you would be better off dead, or of hurting yourself in some way: 0 - not at all  PHQ-2 Score: 4  PHQ-9 Score: 12         VENANCIO-7 Flowsheet Screening      Most Recent Value   Over the last two weeks, how often have you been bothered by the following problems?     Feeling nervous, anxious, or on edge  3   Not being able to stop or control worrying  1   Worrying too much about different things  1   Trouble relaxing   0   Being so restless that it's hard to sit still  0   Becoming easily annoyed or irritable   0   Feeling afraid as if something awful might happen  1   How difficult have these problems made it for you to do your work, take care of things at home, or get along with other people? Somewhat difficult   VENANCIO Score   6           MDQ:  2       Asthma - Triggered by allergies   Uses Albuterol HFA PRN and Breo 100-25 1 puff QD PRN   Last use 3/20   No ICS previously  Zenobia Sousa rafa [address] per Asthma / Allergist   Overdue for appointment        Soledad's Syndrome - x 2   S/p Antibiotics   Never followed c Rheum        H/O Colon Polyps - Colonoscopy per [facility] 3441 Dr Adama Mosher - next colonoscopy 2023        Reviewed:  Labs 3/30/21     Last Uro in [place], [place] c Soledad's Flare   S/p cysto   Last prostate exam years ago - 2017 c colonoscopy - BPH            The following portions of the patient's history were reviewed and updated as appropriate: allergies, current medications, past family history, past medical history, past social history, past surgical history and problem list       Review of Systems   Constitutional: Positive for fatigue  Negative for appetite change, chills, diaphoresis and fever  Respiratory: Negative for cough, chest tightness, shortness of breath and wheezing  Cardiovascular: Negative for chest pain  Gastrointestinal: Negative for abdominal pain, blood in stool, diarrhea, nausea and vomiting  Genitourinary: Negative for dysuria          Current Outpatient Medications   Medication Sig Dispense Refill    albuterol (PROVENTIL HFA,VENTOLIN HFA) 90 mcg/act inhaler Inhale 2 puffs every 6 (six) hours as needed for wheezing 1 Inhaler 0    aspirin (ECOTRIN LOW STRENGTH) 81 mg EC tablet Take 81 mg by mouth daily      atorvastatin (LIPITOR) 20 mg tablet Take 1 tablet (20 mg total) by mouth daily at bedtime 90 tablet 2    Brexpiprazole (Rexulti) 2 MG tablet Take 1 tablet (2 mg total) by mouth daily at bedtime 90 tablet 2    cholecalciferol (VITAMIN D3) 1,000 units tablet Take 1,000 Units by mouth daily      DULoxetine (CYMBALTA) 60 mg delayed release capsule Take 1 capsule (60 mg total) by mouth daily Take with 30mg pill for a total of 90mg daily  90 capsule 2    fluticasone (FLONASE) 50 mcg/act nasal spray 1 spray into each nostril daily 1 Bottle 0    fluticasone-vilanterol (Breo Ellipta) 100-25 mcg/inh inhaler Inhale 1 puff daily as needed Rinse mouth after use   lisinopril (ZESTRIL) 30 mg tablet Take 1 tablet (30 mg total) by mouth daily 90 tablet 2    DULoxetine (CYMBALTA) 30 mg delayed release capsule Take 1 capsule (30 mg total) by mouth daily Take with 60mg pill for a total of 90mg daily  30 capsule 1     No current facility-administered medications for this visit  Objective:  /80 Comment: Patient's BP arm cuff 140/89  Pulse 93   Temp 98 °F (36 7 °C) (Tympanic)   Resp 17   Ht 5' 7" (1 702 m)   Wt 117 kg (257 lb)   SpO2 98%   BMI 40 25 kg/m²    Wt Readings from Last 3 Encounters:   04/13/21 117 kg (257 lb)   01/19/21 115 kg (253 lb)   12/01/20 115 kg (253 lb)      BP Readings from Last 3 Encounters:   04/13/21 122/80   01/19/21 144/89   12/01/20 138/87          Physical Exam  Vitals signs and nursing note reviewed  Constitutional:       Appearance: Normal appearance  He is well-developed  He is obese  HENT:      Head: Normocephalic and atraumatic  Eyes:      Conjunctiva/sclera: Conjunctivae normal    Neck:      Musculoskeletal: Neck supple  Thyroid: No thyromegaly  Cardiovascular:      Rate and Rhythm: Normal rate and regular rhythm  Heart sounds: Normal heart sounds  Pulmonary:      Effort: Pulmonary effort is normal       Breath sounds: Normal breath sounds  Abdominal:      General: Bowel sounds are normal  There is no distension  Palpations: Abdomen is soft  There is no mass  Tenderness: There is no abdominal tenderness  There is no guarding or rebound  Musculoskeletal:         General: No swelling  Right lower leg: No edema  Left lower leg: No edema  Neurological:      General: No focal deficit present  Mental Status: He is alert and oriented to person, place, and time  Psychiatric:         Attention and Perception: Attention and perception normal          Mood and Affect: Mood is depressed  Affect is flat  Speech: Speech normal          Behavior: Behavior normal  Behavior is cooperative  Thought Content: Thought content normal          Cognition and Memory: Cognition and memory normal          Judgment: Judgment normal          Lab Results:      Lab Results   Component Value Date    WBC 5 7 10/01/2019    HGB 14 5 10/01/2019    HCT 43 8 10/01/2019     10/01/2019    TRIG 327 (H) 03/30/2021    HDL 37 (L) 03/30/2021    LDLDIRECT 113 (H) 03/30/2021    ALT 24 03/30/2021    AST 16 03/30/2021    K 4 2 03/30/2021     03/30/2021    CREATININE 1 18 03/30/2021    BUN 22 03/30/2021    CO2 28 03/30/2021    INR 1 0 10/01/2019    HGBA1C 5 5 11/25/2020     No results found for: URICACID  Invalid input(s): BASENAME Vitamin D    No results found       POCT Labs

## 2021-05-07 DIAGNOSIS — F41.1 GENERALIZED ANXIETY DISORDER: ICD-10-CM

## 2021-05-07 RX ORDER — DULOXETIN HYDROCHLORIDE 30 MG/1
30 CAPSULE, DELAYED RELEASE ORAL DAILY
Qty: 30 CAPSULE | Refills: 1 | OUTPATIENT
Start: 2021-05-07

## 2021-06-02 DIAGNOSIS — F41.1 GENERALIZED ANXIETY DISORDER: ICD-10-CM

## 2021-06-02 RX ORDER — DULOXETIN HYDROCHLORIDE 30 MG/1
30 CAPSULE, DELAYED RELEASE ORAL DAILY
Qty: 30 CAPSULE | Refills: 1 | OUTPATIENT
Start: 2021-06-02

## 2021-06-06 LAB
ALBUMIN SERPL-MCNC: 4.2 G/DL (ref 3.6–5.1)
ALBUMIN/GLOB SERPL: 1.8 (CALC) (ref 1–2.5)
ALP SERPL-CCNC: 47 U/L (ref 35–144)
ALT SERPL-CCNC: 27 U/L (ref 9–46)
AST SERPL-CCNC: 18 U/L (ref 10–35)
BILIRUB SERPL-MCNC: 0.5 MG/DL (ref 0.2–1.2)
BUN SERPL-MCNC: 19 MG/DL (ref 7–25)
BUN/CREAT SERPL: ABNORMAL (CALC) (ref 6–22)
CALCIUM SERPL-MCNC: 9.1 MG/DL (ref 8.6–10.3)
CHLORIDE SERPL-SCNC: 107 MMOL/L (ref 98–110)
CO2 SERPL-SCNC: 25 MMOL/L (ref 20–32)
CREAT SERPL-MCNC: 1.11 MG/DL (ref 0.7–1.33)
GLOBULIN SER CALC-MCNC: 2.4 G/DL (CALC) (ref 1.9–3.7)
GLUCOSE SERPL-MCNC: 102 MG/DL (ref 65–99)
POTASSIUM SERPL-SCNC: 4.1 MMOL/L (ref 3.5–5.3)
PROT SERPL-MCNC: 6.6 G/DL (ref 6.1–8.1)
SL AMB EGFR AFRICAN AMERICAN: 84 ML/MIN/1.73M2
SL AMB EGFR NON AFRICAN AMERICAN: 72 ML/MIN/1.73M2
SODIUM SERPL-SCNC: 142 MMOL/L (ref 135–146)

## 2021-06-08 ENCOUNTER — OFFICE VISIT (OUTPATIENT)
Dept: FAMILY MEDICINE CLINIC | Facility: CLINIC | Age: 61
End: 2021-06-08
Payer: COMMERCIAL

## 2021-06-08 VITALS
TEMPERATURE: 98.1 F | WEIGHT: 255.6 LBS | DIASTOLIC BLOOD PRESSURE: 70 MMHG | HEIGHT: 67 IN | OXYGEN SATURATION: 98 % | RESPIRATION RATE: 14 BRPM | SYSTOLIC BLOOD PRESSURE: 120 MMHG | BODY MASS INDEX: 40.12 KG/M2 | HEART RATE: 91 BPM

## 2021-06-08 DIAGNOSIS — E66.01 MORBID OBESITY (HCC): ICD-10-CM

## 2021-06-08 DIAGNOSIS — F31.81 BIPOLAR 2 DISORDER (HCC): ICD-10-CM

## 2021-06-08 DIAGNOSIS — Z99.89 OSA ON CPAP: ICD-10-CM

## 2021-06-08 DIAGNOSIS — F41.1 GENERALIZED ANXIETY DISORDER: ICD-10-CM

## 2021-06-08 DIAGNOSIS — I10 BENIGN ESSENTIAL HYPERTENSION: Primary | ICD-10-CM

## 2021-06-08 DIAGNOSIS — G47.33 OSA ON CPAP: ICD-10-CM

## 2021-06-08 DIAGNOSIS — Z23 ENCOUNTER FOR IMMUNIZATION: ICD-10-CM

## 2021-06-08 PROCEDURE — 3008F BODY MASS INDEX DOCD: CPT | Performed by: FAMILY MEDICINE

## 2021-06-08 PROCEDURE — 3078F DIAST BP <80 MM HG: CPT | Performed by: FAMILY MEDICINE

## 2021-06-08 PROCEDURE — 90471 IMMUNIZATION ADMIN: CPT

## 2021-06-08 PROCEDURE — 99214 OFFICE O/P EST MOD 30 MIN: CPT | Performed by: FAMILY MEDICINE

## 2021-06-08 PROCEDURE — 3074F SYST BP LT 130 MM HG: CPT | Performed by: FAMILY MEDICINE

## 2021-06-08 PROCEDURE — 1036F TOBACCO NON-USER: CPT | Performed by: FAMILY MEDICINE

## 2021-06-08 PROCEDURE — 3725F SCREEN DEPRESSION PERFORMED: CPT | Performed by: FAMILY MEDICINE

## 2021-06-08 PROCEDURE — 90750 HZV VACC RECOMBINANT IM: CPT

## 2021-06-08 RX ORDER — DULOXETIN HYDROCHLORIDE 30 MG/1
30 CAPSULE, DELAYED RELEASE ORAL DAILY
Qty: 90 CAPSULE | Refills: 2 | Status: SHIPPED | OUTPATIENT
Start: 2021-06-08 | End: 2022-02-01

## 2021-06-08 NOTE — PROGRESS NOTES
Assessment/Plan:  Problem List Items Addressed This Visit        Respiratory    RUKHSANA on CPAP     Patient is overdue for sleep medicine follow-up  Increased weight gain may be contributing to his fatigue if his CPAP settings are not appropriate  Cardiovascular and Mediastinum    Benign essential hypertension - Primary     Stable  Check blood pressure outside of office  Recommend lifestyle modifications  Other    Generalized anxiety disorder     Improved on increased Cymbalta 90mg daily  Continue Rexulti 2 mg nightly  Patient is considering counseling  Smart phone nathaniel list and counseling list provided prevoiusly  Previously discussed possible EAP counseling through employer  Relevant Medications    DULoxetine (CYMBALTA) 30 mg delayed release capsule    Bipolar 2 disorder (HCC)     Improved on increased Cymbalta 90mg daily  Continue Rexulti 2 mg nightly  Patient is considering counseling  Smart phone nathaniel list and counseling list provided prevoiusly  Previously discussed possible EAP counseling through employer  Relevant Medications    DULoxetine (CYMBALTA) 30 mg delayed release capsule    Morbid obesity (HCC)     Stable  Recommend lifestyle modifications  Other Visit Diagnoses     Encounter for immunization        Relevant Orders    Zoster Vaccine Recombinant IM (Completed)    Zoster Vaccine Recombinant IM           Return in about 2 months (around 8/13/2021) for 4mo - HTN, HL, Anx, BPD, Low T, RUKHSANA, Obesity, Labs; Shingrix #2  Future Appointments   Date Time Provider Oh Clement   8/17/2021  6:20 PM Mitzi Pereira DO [facility]        Subjective:     Chinyere Del Rio is a 63 y o  male who presents today for a follow-up on his chronic medical conditions  HPI:  Chief Complaint   Patient presents with    Follow-up     HTN, Anx, BPD, Labs;     -- Above per clinical staff and reviewed   --      HPI      Today:    Return in about 6 weeks (around 2021) for F/U HTN, Anx, BPD, Labs; 4mo - HTN, HL, Anx, BPD, Low T, RUKHSANA, Obesity, Labs  F/U HTN, Anx, BPD, Labs     Obesity - Watching diet - avoiding salt in food, but eating increased portions of healthy good   +Regular exercise - Walking with wife 45 minutes, 4 times per week; bike riding 35 minutes, 2 times per week      HTN - On Lisinopril 30mg QD   No higher dose or other HTN Rx previously   No BP check outside of office  Cathernie Sotelo has an arm BP cuff at home   Stable Echo 10/10/19        Hyperlipidemia - On Lipitor 20mg QHS   No higher doses   Unsure of other statin name that he had tried previously - ineffective   s/p clean cardiac cath 10/4/19        RUKHSANA on CPAP - Uses CPAP regularly   Overdue for Sleep Specialist on [address] - last seen when he got a new CPAP machine in 2018   Last sleep study 2017?, no weight changes in the interim          Bipolar Disorder - He no longer feels mood is "BLAH" for months, he attributes to Delmy  will need to take a technical test in the future, which will continue in the future to maintain his job status  Feels motivation is low   On Rexulti 2mg QHS - has not been on higher doses   D/C Lamictal due to hives   Good social supports  [place] counseling 2018 - helpful   No SI/HI/AH/VH         Anxiety - On increased Cymbalta 90mg QD x 6 weeks   He feels drained by his anxiety due to increased work stressors, but able to cope better   Feels 90% improved   Previously on Zoloft - ineffective         PHQ-9 Depression Screening    PHQ-9:   Frequency of the following problems over the past two weeks:      Little interest or pleasure in doing things: 0 - not at all  Feeling down, depressed, or hopeless: 0 - not at all  Trouble falling or staying asleep, or sleeping too much: 0 - not at all  Feeling tired or having little energy: 1 - several days  Poor appetite or overeatin - not at all  Feeling bad about yourself - or that you are a failure or have let yourself or your family down: 0 - not at all  Trouble concentrating on things, such as reading the newspaper or watching television: 0 - not at all  Moving or speaking so slowly that other people could have noticed  Or the opposite - being so fidgety or restless that you have been moving around a lot more than usual: 0 - not at all  Thoughts that you would be better off dead, or of hurting yourself in some way: 0 - not at all  PHQ-2 Score: 0  PHQ-9 Score: 1         VENANCIO-7 Flowsheet Screening      Most Recent Value   Over the last two weeks, how often have you been bothered by the following problems? Feeling nervous, anxious, or on edge  0   Not being able to stop or control worrying  0   Worrying too much about different things  0   Trouble relaxing   0   Being so restless that it's hard to sit still  0   Becoming easily annoyed or irritable   0   Feeling afraid as if something awful might happen  0   How difficult have these problems made it for you to do your work, take care of things at home, or get along with other people? Not difficult at all   VENANCIO Score   0        MDQ:  1, No Problem           From previous note:    4mo - HTN, HL, Anx, BPD, Low T, RUKHSANA, Obesity, Labs        Patient desires PSA - Last PSA 1 2 on 6/29/20  [facility] declines BRITTANY       Low Testosterone - x 2 6/29/20 and 11/25/20   Low libido   Denies ED   Previously referred to Urology on 12/1/20          Obesity - Watching diet - avoiding salt in food, but eating increased portions of healthy good   No regular exercise   Occasionally walking with wife    Previously enjoyed bike riding        HTN - On Lisinopril 30mg QD   No higher dose or other HTN Rx previously   No BP check outside of office  [facility] has an arm BP cuff at home   Stable Echo 10/10/19        Hyperlipidemia - On Lipitor 20mg QHS   No higher doses   Unsure of other statin name that he had tried previously - ineffective   s/p clean cardiac cath 10/4/19        RUKHSANA on CPAP - Uses CPAP regularly  [facility] for Sleep Specialist on [address] - last seen when he got a new CPAP machine in 2018   Last sleep study 2017?, no weight changes in the interim          Bipolar Disease - Feels mood is "BLAH" for months, he attributes to work stress  Maeve Salas will need to take a technical test in the future, which will continue in the future to maintain his job status  Feels motivation is low   On Rexulti 2mg QHS - has not been on higher doses   D/C Lamictal due to hives   Good social supports  [facility] 2018 - helpful   No SI/HI/AH/VH        Anxiety - On increased Cymbalta 60mg QD   He feels drained by his anxiety due to increased work stressors   Feels 40-50% improved   Has not been on higher doses   Previously on Zoloft - ineffective           PHQ-9 Depression Screening    PHQ-9:   Frequency of the following problems over the past two weeks:      Little interest or pleasure in doing things: 2 - more than half the days  Feeling down, depressed, or hopeless: 2 - more than half the days  Trouble falling or staying asleep, or sleeping too much: 0 - not at all  Feeling tired or having little energy: 3 - nearly every day  Poor appetite or overeatin - more than half the days  Feeling bad about yourself - or that you are a failure or have let yourself or your family down: 1 - several days  Trouble concentrating on things, such as reading the newspaper or watching television: 2 - more than half the days  Moving or speaking so slowly that other people could have noticed  Or the opposite - being so fidgety or restless that you have been moving around a lot more than usual: 0 - not at all  Thoughts that you would be better off dead, or of hurting yourself in some way: 0 - not at all  PHQ-2 Score: 4  PHQ-9 Score: 12                VENANCIO-7 Flowsheet Screening      Most Recent Value   Over the last two weeks, how often have you been bothered by the following problems?     Feeling nervous, anxious, or on edge  3   Not being able to stop or control worrying  1   Worrying too much about different things  1   Trouble relaxing   0   Being so restless that it's hard to sit still  0   Becoming easily annoyed or irritable   0   Feeling afraid as if something awful might happen  1   How difficult have these problems made it for you to do your work, take care of things at home, or get along with other people? Somewhat difficult   VENANCIO Score   6              MDQ:  2        Asthma - Triggered by allergies   Uses Albuterol HFA PRN and Breo 100-25 1 puff QD PRN   Last use 3/20   No ICS previously  Noreen Naflako rafa [id number] per Asthma / Allergist   Overdue for appointment        Soledad's Syndrome - x 2   S/p Antibiotics   Never followed c Rheum        H/O Colon Polyps - Colonoscopy per [facility] [address] Dr Dusty Givens - next colonoscopy 2023        Reviewed:  Labs 6/5/21     Last Uro in [place], [place] c Soledad[facility] Flare   S/p cysto   Last prostate exam years ago - 2017 c colonoscopy - BPH                 The following portions of the patient's history were reviewed and updated as appropriate: allergies, current medications, past family history, past medical history, past social history, past surgical history and problem list       Review of Systems   Constitutional: Negative for appetite change, chills, diaphoresis, fatigue and fever  Respiratory: Negative for chest tightness and shortness of breath  Cardiovascular: Negative for chest pain  Gastrointestinal: Negative for abdominal pain, blood in stool, diarrhea, nausea and vomiting  Genitourinary: Negative for dysuria          Current Outpatient Medications   Medication Sig Dispense Refill    aspirin (ECOTRIN LOW STRENGTH) 81 mg EC tablet Take 81 mg by mouth daily      atorvastatin (LIPITOR) 20 mg tablet Take 1 tablet (20 mg total) by mouth daily at bedtime 90 tablet 2    Brexpiprazole (Rexulti) 2 MG tablet Take 1 tablet (2 mg total) by mouth daily at bedtime 90 tablet 2    cholecalciferol (VITAMIN D3) 1,000 units tablet Take 1,000 Units by mouth daily      DULoxetine (CYMBALTA) 30 mg delayed release capsule Take 1 capsule (30 mg total) by mouth daily Take with 60mg pill for a total of 90mg daily  90 capsule 2    DULoxetine (CYMBALTA) 60 mg delayed release capsule Take 1 capsule (60 mg total) by mouth daily Take with 30mg pill for a total of 90mg daily  90 capsule 2    fluticasone (FLONASE) 50 mcg/act nasal spray 1 spray into each nostril daily 1 Bottle 0    fluticasone-vilanterol (Breo Ellipta) 100-25 mcg/inh inhaler Inhale 1 puff daily as needed Rinse mouth after use   lisinopril (ZESTRIL) 30 mg tablet Take 1 tablet (30 mg total) by mouth daily 90 tablet 2    albuterol (PROVENTIL HFA,VENTOLIN HFA) 90 mcg/act inhaler Inhale 2 puffs every 6 (six) hours as needed for wheezing (Patient not taking: Reported on 6/8/2021) 1 Inhaler 0     No current facility-administered medications for this visit  Objective:  /70 (BP Location: Right arm)   Pulse 91   Temp 98 1 °F (36 7 °C)   Resp 14   Ht 5' 7" (1 702 m)   Wt 116 kg (255 lb 9 6 oz)   SpO2 98%   BMI 40 03 kg/m²    Wt Readings from Last 3 Encounters:   06/08/21 116 kg (255 lb 9 6 oz)   04/13/21 117 kg (257 lb)   01/19/21 115 kg (253 lb)      BP Readings from Last 3 Encounters:   06/08/21 120/70   04/13/21 122/80   01/19/21 144/89          Physical Exam  Vitals signs and nursing note reviewed  Constitutional:       Appearance: Normal appearance  He is well-developed  He is obese  HENT:      Head: Normocephalic and atraumatic  Eyes:      Conjunctiva/sclera: Conjunctivae normal    Neck:      Musculoskeletal: Neck supple  Thyroid: No thyromegaly  Cardiovascular:      Rate and Rhythm: Normal rate and regular rhythm  Heart sounds: Normal heart sounds  Pulmonary:      Effort: Pulmonary effort is normal       Breath sounds: Normal breath sounds  Musculoskeletal:         General: No swelling or tenderness  Right lower leg: No edema        Left lower leg: No edema  Neurological:      General: No focal deficit present  Mental Status: He is alert and oriented to person, place, and time  Psychiatric:         Mood and Affect: Mood normal          Behavior: Behavior normal          Thought Content: Thought content normal          Judgment: Judgment normal          Lab Results:      Lab Results   Component Value Date    WBC 5 7 10/01/2019    HGB 14 5 10/01/2019    HCT 43 8 10/01/2019     10/01/2019    TRIG 327 (H) 03/30/2021    HDL 37 (L) 03/30/2021    LDLDIRECT 113 (H) 03/30/2021    ALT 27 06/05/2021    AST 18 06/05/2021    K 4 1 06/05/2021     06/05/2021    CREATININE 1 11 06/05/2021    BUN 19 06/05/2021    CO2 25 06/05/2021    INR 1 0 10/01/2019    HGBA1C 5 5 11/25/2020     No results found for: URICACID  Invalid input(s): BASENAME Vitamin D    No results found       POCT Labs

## 2021-06-11 NOTE — ASSESSMENT & PLAN NOTE
Improved on increased Cymbalta 90mg daily  Continue Rexulti 2 mg nightly  Patient is considering counseling  Smart phone nathaniel list and counseling list provided prevoiusly  Previously discussed possible EAP counseling through employer

## 2021-08-29 DIAGNOSIS — F31.81 BIPOLAR 2 DISORDER (HCC): ICD-10-CM

## 2021-08-29 LAB
ALBUMIN SERPL-MCNC: 4.3 G/DL (ref 3.6–5.1)
ALBUMIN/GLOB SERPL: 1.7 (CALC) (ref 1–2.5)
ALP SERPL-CCNC: 48 U/L (ref 35–144)
ALT SERPL-CCNC: 34 U/L (ref 9–46)
AST SERPL-CCNC: 21 U/L (ref 10–35)
BASOPHILS # BLD AUTO: 54 CELLS/UL (ref 0–200)
BASOPHILS NFR BLD AUTO: 0.8 %
BILIRUB SERPL-MCNC: 0.8 MG/DL (ref 0.2–1.2)
BUN SERPL-MCNC: 22 MG/DL (ref 7–25)
BUN/CREAT SERPL: NORMAL (CALC) (ref 6–22)
CALCIUM SERPL-MCNC: 9.5 MG/DL (ref 8.6–10.3)
CHLORIDE SERPL-SCNC: 104 MMOL/L (ref 98–110)
CHOLEST SERPL-MCNC: 214 MG/DL
CHOLEST/HDLC SERPL: 5.5 (CALC)
CO2 SERPL-SCNC: 26 MMOL/L (ref 20–32)
CREAT SERPL-MCNC: 1.12 MG/DL (ref 0.7–1.33)
EOSINOPHIL # BLD AUTO: 335 CELLS/UL (ref 15–500)
EOSINOPHIL NFR BLD AUTO: 5 %
ERYTHROCYTE [DISTWIDTH] IN BLOOD BY AUTOMATED COUNT: 13.7 % (ref 11–15)
GLOBULIN SER CALC-MCNC: 2.5 G/DL (CALC) (ref 1.9–3.7)
GLUCOSE SERPL-MCNC: 92 MG/DL (ref 65–99)
HCT VFR BLD AUTO: 46 % (ref 38.5–50)
HDLC SERPL-MCNC: 39 MG/DL
HGB BLD-MCNC: 15.7 G/DL (ref 13.2–17.1)
LDLC SERPL CALC-MCNC: 124 MG/DL (CALC)
LDLC SERPL DIRECT ASSAY-MCNC: 120 MG/DL
LYMPHOCYTES # BLD AUTO: 2251 CELLS/UL (ref 850–3900)
LYMPHOCYTES NFR BLD AUTO: 33.6 %
MCH RBC QN AUTO: 32.2 PG (ref 27–33)
MCHC RBC AUTO-ENTMCNC: 34.1 G/DL (ref 32–36)
MCV RBC AUTO: 94.3 FL (ref 80–100)
MONOCYTES # BLD AUTO: 516 CELLS/UL (ref 200–950)
MONOCYTES NFR BLD AUTO: 7.7 %
NEUTROPHILS # BLD AUTO: 3544 CELLS/UL (ref 1500–7800)
NEUTROPHILS NFR BLD AUTO: 52.9 %
NONHDLC SERPL-MCNC: 175 MG/DL (CALC)
PLATELET # BLD AUTO: 257 THOUSAND/UL (ref 140–400)
PMV BLD REES-ECKER: 9.5 FL (ref 7.5–12.5)
POTASSIUM SERPL-SCNC: 4.3 MMOL/L (ref 3.5–5.3)
PROT SERPL-MCNC: 6.8 G/DL (ref 6.1–8.1)
PSA SERPL-MCNC: 1.2 NG/ML
RBC # BLD AUTO: 4.88 MILLION/UL (ref 4.2–5.8)
SL AMB EGFR AFRICAN AMERICAN: 83 ML/MIN/1.73M2
SL AMB EGFR NON AFRICAN AMERICAN: 72 ML/MIN/1.73M2
SODIUM SERPL-SCNC: 140 MMOL/L (ref 135–146)
TRIGL SERPL-MCNC: 360 MG/DL
TSH SERPL-ACNC: 2.55 MIU/L (ref 0.4–4.5)
WBC # BLD AUTO: 6.7 THOUSAND/UL (ref 3.8–10.8)

## 2021-08-30 RX ORDER — BREXPIPRAZOLE 2 MG/1
TABLET ORAL
Qty: 90 TABLET | Refills: 2 | Status: SHIPPED | OUTPATIENT
Start: 2021-08-30 | End: 2022-02-01 | Stop reason: SDUPTHER

## 2021-08-30 NOTE — RESULT ENCOUNTER NOTE
Unstable labs - will review with patient at upcoming appointment  Hyperlipidemia - To consider start Fish Oil 4gm daily?

## 2021-08-31 ENCOUNTER — OFFICE VISIT (OUTPATIENT)
Dept: FAMILY MEDICINE CLINIC | Facility: CLINIC | Age: 61
End: 2021-08-31
Payer: COMMERCIAL

## 2021-08-31 VITALS
SYSTOLIC BLOOD PRESSURE: 110 MMHG | HEIGHT: 67 IN | RESPIRATION RATE: 18 BRPM | OXYGEN SATURATION: 97 % | WEIGHT: 261.4 LBS | DIASTOLIC BLOOD PRESSURE: 68 MMHG | TEMPERATURE: 97.5 F | HEART RATE: 97 BPM | BODY MASS INDEX: 41.03 KG/M2

## 2021-08-31 DIAGNOSIS — Z86.010 HISTORY OF COLON POLYPS: ICD-10-CM

## 2021-08-31 DIAGNOSIS — Z23 ENCOUNTER FOR IMMUNIZATION: ICD-10-CM

## 2021-08-31 DIAGNOSIS — Z13.6 SCREENING FOR CARDIOVASCULAR CONDITION: ICD-10-CM

## 2021-08-31 DIAGNOSIS — F31.81 BIPOLAR 2 DISORDER (HCC): ICD-10-CM

## 2021-08-31 DIAGNOSIS — Z13.1 SCREENING FOR DIABETES MELLITUS: ICD-10-CM

## 2021-08-31 DIAGNOSIS — E78.2 MIXED HYPERLIPIDEMIA: ICD-10-CM

## 2021-08-31 DIAGNOSIS — Z99.89 OSA ON CPAP: Primary | ICD-10-CM

## 2021-08-31 DIAGNOSIS — I10 BENIGN ESSENTIAL HYPERTENSION: ICD-10-CM

## 2021-08-31 DIAGNOSIS — G47.33 OSA ON CPAP: Primary | ICD-10-CM

## 2021-08-31 DIAGNOSIS — E66.01 MORBID OBESITY (HCC): ICD-10-CM

## 2021-08-31 DIAGNOSIS — F41.1 GENERALIZED ANXIETY DISORDER: ICD-10-CM

## 2021-08-31 DIAGNOSIS — J45.909 ASTHMA DUE TO SEASONAL ALLERGIES: ICD-10-CM

## 2021-08-31 PROCEDURE — 3725F SCREEN DEPRESSION PERFORMED: CPT | Performed by: FAMILY MEDICINE

## 2021-08-31 PROCEDURE — 99214 OFFICE O/P EST MOD 30 MIN: CPT | Performed by: FAMILY MEDICINE

## 2021-08-31 PROCEDURE — 90750 HZV VACC RECOMBINANT IM: CPT

## 2021-08-31 PROCEDURE — 1036F TOBACCO NON-USER: CPT | Performed by: FAMILY MEDICINE

## 2021-08-31 PROCEDURE — 90471 IMMUNIZATION ADMIN: CPT

## 2021-08-31 PROCEDURE — 3008F BODY MASS INDEX DOCD: CPT | Performed by: FAMILY MEDICINE

## 2021-08-31 PROCEDURE — 3078F DIAST BP <80 MM HG: CPT | Performed by: FAMILY MEDICINE

## 2021-08-31 PROCEDURE — 3074F SYST BP LT 130 MM HG: CPT | Performed by: FAMILY MEDICINE

## 2021-08-31 RX ORDER — ATORVASTATIN CALCIUM 20 MG/1
20 TABLET, FILM COATED ORAL
Qty: 90 TABLET | Refills: 2 | Status: SHIPPED | OUTPATIENT
Start: 2021-08-31 | End: 2022-02-01 | Stop reason: SDUPTHER

## 2021-08-31 RX ORDER — DULOXETIN HYDROCHLORIDE 60 MG/1
60 CAPSULE, DELAYED RELEASE ORAL DAILY
Qty: 90 CAPSULE | Refills: 2 | Status: CANCELLED
Start: 2021-08-31

## 2021-08-31 RX ORDER — LISINOPRIL 30 MG/1
30 TABLET ORAL DAILY
Qty: 90 TABLET | Refills: 2 | Status: SHIPPED | OUTPATIENT
Start: 2021-08-31 | End: 2022-02-01 | Stop reason: SDUPTHER

## 2021-08-31 RX ORDER — DULOXETIN HYDROCHLORIDE 30 MG/1
30 CAPSULE, DELAYED RELEASE ORAL DAILY
Qty: 90 CAPSULE | Refills: 2 | Status: CANCELLED | OUTPATIENT
Start: 2021-08-31

## 2021-08-31 NOTE — PROGRESS NOTES
Assessment/Plan:  Problem List Items Addressed This Visit        Respiratory    RUKHSANA on CPAP - Primary     Patient is overdue for sleep medicine follow-up  Increased weight gain may be contributing to his fatigue if his CPAP settings are not appropriate  Asthma due to seasonal allergies     Stable  Management per asthma/allergy-overdue for appointment  Cardiovascular and Mediastinum    Benign essential hypertension     Stable  Check blood pressure outside of office  Recommend lifestyle modifications  Relevant Medications    lisinopril (ZESTRIL) 30 mg tablet    Other Relevant Orders    Comprehensive metabolic panel       Other    Mixed hyperlipidemia     Stable  Continue Lipitor 20 mg nightly  Patient declines dose increase to Lipitor 40 mg nightly, but may reconsider if no improvement in the future  Recommend lifestyle modifications  Relevant Medications    atorvastatin (LIPITOR) 20 mg tablet    Other Relevant Orders    Comprehensive metabolic panel    Lipid panel    TSH, 3rd generation with Free T4 reflex    LDL cholesterol, direct    Generalized anxiety disorder     Improved on Cymbalta 90mg daily  Continue Rexulti 2 mg nightly  Relevant Orders    TSH, 3rd generation with Free T4 reflex    Bipolar 2 disorder ([address] )     Improved on Cymbalta 90mg daily  Continue Rexulti 2 mg nightly  Relevant Orders    TSH, 3rd generation with Free T4 reflex    Morbid obesity ([facility] [address] )     Worsening  Recommend lifestyle modifications  Relevant Orders    TSH, 3rd generation with Free T4 reflex    History of colon polyps     Colonoscopy up to date               Other Visit Diagnoses     Screening for cardiovascular condition        Relevant Orders    Comprehensive metabolic panel    Lipid panel    LDL cholesterol, direct    Screening for diabetes mellitus        Relevant Orders    Hemoglobin A1C    Encounter for immunization               Return in about 4 months (around 12/31/2021) for Physical / 4mo - HTN, HL, Anx, BPD, Low T, RUKHSANA, Obesity, Labs  Future Appointments   Date Time Provider Oh Clement   1/4/2022  6:20 PM Olga Malik DO [facility]        Subjective:     Kia Jim is a 63 y o  male who presents today for a follow-up on his chronic medical conditions  HPI:  Chief Complaint   Patient presents with    Follow-up     no concerns     Medication Refill     see pended     Labs Only     completed      -- Above per clinical staff and reviewed  --      HPI      Today:      Return in about 2 months (around 8/13/2021) for 4mo - HTN, HL, Anx, BPD, Low T, RUKHSANA, Obesity, Labs; Shingrix #2  4mo OV    Patient desires PSA - Last PSA 1 2 on 6/29/20  Korey Trivedi declines BRITTANY       Low Testosterone - x 2 6/29/20 and 11/25/20   Low libido   Denies ED   Previously referred to Urology on 12/1/20           Obesity - Watching diet - avoiding salt in food, but eating increased portions of healthy good   +Regular exercise - Swimming for 30 minutes, 4 days per week; previously Walking with wife 45 minutes, 4 times per week; bike riding 35 minutes, 2 times per week      HTN - On Lisinopril 30mg QD   No higher dose or other HTN Rx previously   No BP check outside of office  Korey Trivedi has an arm BP cuff at home   Stable Echo 10/10/19        Hyperlipidemia - On Lipitor 20mg QHS   No higher doses   Unsure of other statin name that he had tried previously - ineffective   s/p clean cardiac cath 10/4/19        RUKHSANA on CPAP - Uses CPAP regularly   Overdue for Sleep Specialist on [address] - last seen when he got a new CPAP machine in 2018   Last sleep study 2017?, no weight changes in the interim    He has not schedule appt c Sleep Medicine team yet - referred 12/1/20         [address]- He no longer feels mood is "BLAH" for months, he attributes to work stress  Korey Trivedi will need to take a technical test in the future, which will continue in the future to maintain his job status   Feels motivation is good   On Cymbalta 90mg QD  On Rexulti 2mg QHS - has not been on higher doses   D/C Lamictal due to hives   Good social supports   Last counseling 2018 - helpful   No SI/HI/AH/VH        Anxiety - On increased Cymbalta 90mg QD   He no longer feels drained by his anxiety due to increased work stressors, and is able to cope better   Feels 90% improved   Previously on Zoloft - ineffective            PHQ-9 Depression Screening    PHQ-9:   Frequency of the following problems over the past two weeks:      Little interest or pleasure in doing things: 0 - not at all  Feeling down, depressed, or hopeless: 0 - not at all  Trouble falling or staying asleep, or sleeping too much: 0 - not at all  Feeling tired or having little energy: 0 - not at all  Poor appetite or overeatin - not at all  Feeling bad about yourself - or that you are a failure or have let yourself or your family down: 0 - not at all  Trouble concentrating on things, such as reading the newspaper or watching television: 0 - not at all  Moving or speaking so slowly that other people could have noticed  Or the opposite - being so fidgety or restless that you have been moving around a lot more than usual: 0 - not at all  Thoughts that you would be better off dead, or of hurting yourself in some way: 0 - not at all  PHQ-2 Score: 0  PHQ-9 Score: 0         VENANCIO-7 Flowsheet Screening      Most Recent Value   Over the last 2 weeks, how often have you been bothered by any of the following problems?    Feeling nervous, anxious, or on edge  0   Not being able to stop or control worrying  0   Worrying too much about different things  0   Trouble relaxing  0   Being so restless that it is hard to sit still  0   Becoming easily annoyed or irritable  0   Feeling afraid as if something awful might happen  0   VENANCIO-7 Total Score  0        MDQ:  2, No Problem        Asthma - Triggered by allergies   Uses Albuterol HFA PRN and Breo 100-25 1 puff QD PRN  Iris Bishop use 3/20   No ICS previously  Iris Bishop rafa [id number] per Asthma / Allergist   Overdue for appointment   ACT 25 on 8/31/21        Soledad's Syndrome - x 2   S/p Antibiotics   Never followed c Rheum        H/O Colon Polyps - Colonoscopy per QD 0754 Neli Boyd - next colonoscopy 2023        Reviewed:  Labs 8/28/21     Last Uro in [place] [facility]   S/p cysto   Last prostate exam years ago - 2017 c colonoscopy - BPH                 The following portions of the patient's history were reviewed and updated as appropriate: allergies, current medications, past family history, past medical history, past social history, past surgical history and problem list       Review of Systems   Constitutional: Negative for appetite change, chills, diaphoresis, fatigue and fever  Respiratory: Negative for cough, chest tightness, shortness of breath and wheezing  Cardiovascular: Negative for chest pain  Gastrointestinal: Negative for abdominal pain, blood in stool, diarrhea, nausea and vomiting  Genitourinary: Negative for dysuria  Current Outpatient Medications   Medication Sig Dispense Refill    albuterol (PROVENTIL HFA,VENTOLIN HFA) 90 mcg/act inhaler Inhale 2 puffs every 6 (six) hours as needed for wheezing 1 Inhaler 0    aspirin (ECOTRIN LOW STRENGTH) 81 mg EC tablet Take 81 mg by mouth daily      cholecalciferol (VITAMIN D3) 1,000 units tablet Take 1,000 Units by mouth daily      DULoxetine (CYMBALTA) 30 mg delayed release capsule Take 1 capsule (30 mg total) by mouth daily Take with 60mg pill for a total of 90mg daily  90 capsule 2    DULoxetine (CYMBALTA) 60 mg delayed release capsule Take 1 capsule (60 mg total) by mouth daily Take with 30mg pill for a total of 90mg daily   90 capsule 2    fluticasone (FLONASE) 50 mcg/act nasal spray 1 spray into each nostril daily 1 Bottle 0    fluticasone-vilanterol (Breo Ellipta) 100-25 mcg/inh inhaler Inhale 1 puff daily as needed Rinse mouth after use   Rexulti 2 MG tablet TAKE 1 TABLET (2 MG TOTAL) BY MOUTH DAILY AT BEDTIME 90 tablet 2    atorvastatin (LIPITOR) 20 mg tablet Take 1 tablet (20 mg total) by mouth daily at bedtime 90 tablet 2    lisinopril (ZESTRIL) 30 mg tablet Take 1 tablet (30 mg total) by mouth daily 90 tablet 2     No current facility-administered medications for this visit  Objective:  /68   Pulse 97   Temp 97 5 °F (36 4 °C)   Resp 18   Ht 5' 7" (1 702 m)   Wt 119 kg (261 lb 6 4 oz)   SpO2 97%   BMI 40 94 kg/m²    Wt Readings from Last 3 Encounters:   08/31/21 119 kg (261 lb 6 4 oz)   06/08/21 116 kg (255 lb 9 6 oz)   04/13/21 117 kg (257 lb)      BP Readings from Last 3 Encounters:   08/31/21 110/68   06/08/21 120/70   04/13/21 122/80          Physical Exam  Vitals and nursing note reviewed  Constitutional:       Appearance: Normal appearance  He is well-developed  He is obese  HENT:      Head: Normocephalic and atraumatic  Eyes:      Conjunctiva/sclera: Conjunctivae normal    Neck:      Thyroid: No thyromegaly  Cardiovascular:      Rate and Rhythm: Normal rate and regular rhythm  Pulses: Normal pulses  Heart sounds: Normal heart sounds  Pulmonary:      Effort: Pulmonary effort is normal       Breath sounds: Normal breath sounds  Abdominal:      General: Bowel sounds are normal  There is no distension  Palpations: Abdomen is soft  There is no mass  Tenderness: There is no abdominal tenderness  There is no guarding or rebound  Musculoskeletal:         General: No swelling  Cervical back: Neck supple  Right lower leg: No edema  Left lower leg: No edema  Neurological:      General: No focal deficit present  Mental Status: He is alert and oriented to person, place, and time  Psychiatric:         Mood and Affect: Mood normal          Behavior: Behavior normal          Thought Content:  Thought content normal          Judgment: Judgment normal          Lab Results:      Lab Results   Component Value Date    WBC 6 7 08/28/2021    HGB 15 7 08/28/2021    HCT 46 0 08/28/2021     08/28/2021    TRIG 360 (H) 08/28/2021    HDL 39 (L) 08/28/2021    LDLDIRECT 120 (H) 08/28/2021    ALT 34 08/28/2021    AST 21 08/28/2021    K 4 3 08/28/2021     08/28/2021    CREATININE 1 12 08/28/2021    BUN 22 08/28/2021    CO2 26 08/28/2021    PSA 1 2 08/28/2021    INR 1 0 10/01/2019    HGBA1C 5 5 11/25/2020     No results found for: URICACID  Invalid input(s): BASENAME Vitamin D    No results found       POCT Labs

## 2021-09-02 NOTE — ASSESSMENT & PLAN NOTE
Stable  Continue Lipitor 20 mg nightly  Patient declines dose increase to Lipitor 40 mg nightly, but may reconsider if no improvement in the future  Recommend lifestyle modifications

## 2021-10-19 DIAGNOSIS — F41.1 GENERALIZED ANXIETY DISORDER: ICD-10-CM

## 2021-10-19 RX ORDER — DULOXETIN HYDROCHLORIDE 60 MG/1
60 CAPSULE, DELAYED RELEASE ORAL DAILY
Qty: 90 CAPSULE | Refills: 2
Start: 2021-10-19 | End: 2022-04-12 | Stop reason: SDUPTHER

## 2021-10-21 ENCOUNTER — TELEPHONE (OUTPATIENT)
Dept: FAMILY MEDICINE CLINIC | Facility: CLINIC | Age: 61
End: 2021-10-21

## 2022-01-28 ENCOUNTER — LAB (OUTPATIENT)
Dept: LAB | Facility: CLINIC | Age: 62
End: 2022-01-28
Payer: COMMERCIAL

## 2022-01-28 DIAGNOSIS — I10 BENIGN ESSENTIAL HYPERTENSION: ICD-10-CM

## 2022-01-28 DIAGNOSIS — F31.81 BIPOLAR 2 DISORDER (HCC): ICD-10-CM

## 2022-01-28 DIAGNOSIS — F41.1 GENERALIZED ANXIETY DISORDER: ICD-10-CM

## 2022-01-28 DIAGNOSIS — E78.2 MIXED HYPERLIPIDEMIA: ICD-10-CM

## 2022-01-28 DIAGNOSIS — Z13.6 SCREENING FOR CARDIOVASCULAR CONDITION: ICD-10-CM

## 2022-01-28 DIAGNOSIS — Z13.1 SCREENING FOR DIABETES MELLITUS: ICD-10-CM

## 2022-01-28 DIAGNOSIS — E66.01 MORBID OBESITY (HCC): ICD-10-CM

## 2022-01-28 LAB
ALBUMIN SERPL BCP-MCNC: 3.8 G/DL (ref 3.5–5)
ALP SERPL-CCNC: 53 U/L (ref 46–116)
ALT SERPL W P-5'-P-CCNC: 38 U/L (ref 12–78)
ANION GAP SERPL CALCULATED.3IONS-SCNC: 11 MMOL/L (ref 4–13)
AST SERPL W P-5'-P-CCNC: 19 U/L (ref 5–45)
BILIRUB SERPL-MCNC: 0.54 MG/DL (ref 0.2–1)
BUN SERPL-MCNC: 22 MG/DL (ref 5–25)
CALCIUM SERPL-MCNC: 9 MG/DL (ref 8.3–10.1)
CHLORIDE SERPL-SCNC: 104 MMOL/L (ref 100–108)
CHOLEST SERPL-MCNC: 200 MG/DL
CO2 SERPL-SCNC: 26 MMOL/L (ref 21–32)
CREAT SERPL-MCNC: 1.22 MG/DL (ref 0.6–1.3)
GFR SERPL CREATININE-BSD FRML MDRD: 64 ML/MIN/1.73SQ M
GLUCOSE P FAST SERPL-MCNC: 100 MG/DL (ref 65–99)
HDLC SERPL-MCNC: 40 MG/DL
LDLC SERPL CALC-MCNC: 90 MG/DL (ref 0–100)
LDLC SERPL DIRECT ASSAY-MCNC: 113 MG/DL (ref 0–100)
NONHDLC SERPL-MCNC: 160 MG/DL
POTASSIUM SERPL-SCNC: 4 MMOL/L (ref 3.5–5.3)
PROT SERPL-MCNC: 7.2 G/DL (ref 6.4–8.2)
SODIUM SERPL-SCNC: 141 MMOL/L (ref 136–145)
TRIGL SERPL-MCNC: 352 MG/DL
TSH SERPL DL<=0.05 MIU/L-ACNC: 2.21 UIU/ML (ref 0.36–3.74)

## 2022-01-28 PROCEDURE — 83036 HEMOGLOBIN GLYCOSYLATED A1C: CPT

## 2022-01-28 PROCEDURE — 36415 COLL VENOUS BLD VENIPUNCTURE: CPT

## 2022-01-28 PROCEDURE — 80053 COMPREHEN METABOLIC PANEL: CPT

## 2022-01-28 PROCEDURE — 80061 LIPID PANEL: CPT

## 2022-01-28 PROCEDURE — 84443 ASSAY THYROID STIM HORMONE: CPT

## 2022-01-28 PROCEDURE — 83721 ASSAY OF BLOOD LIPOPROTEIN: CPT

## 2022-01-29 LAB
EST. AVERAGE GLUCOSE BLD GHB EST-MCNC: 117 MG/DL
HBA1C MFR BLD: 5.7 %

## 2022-01-30 PROBLEM — R73.01 IFG (IMPAIRED FASTING GLUCOSE): Status: ACTIVE | Noted: 2022-01-30

## 2022-02-01 ENCOUNTER — OFFICE VISIT (OUTPATIENT)
Dept: FAMILY MEDICINE CLINIC | Facility: CLINIC | Age: 62
End: 2022-02-01
Payer: COMMERCIAL

## 2022-02-01 VITALS
SYSTOLIC BLOOD PRESSURE: 126 MMHG | TEMPERATURE: 97.5 F | DIASTOLIC BLOOD PRESSURE: 78 MMHG | HEIGHT: 67 IN | HEART RATE: 111 BPM | RESPIRATION RATE: 16 BRPM | OXYGEN SATURATION: 98 % | WEIGHT: 256.4 LBS | BODY MASS INDEX: 40.24 KG/M2

## 2022-02-01 DIAGNOSIS — R73.01 IFG (IMPAIRED FASTING GLUCOSE): ICD-10-CM

## 2022-02-01 DIAGNOSIS — E66.01 MORBID OBESITY (HCC): ICD-10-CM

## 2022-02-01 DIAGNOSIS — F31.81 BIPOLAR 2 DISORDER (HCC): ICD-10-CM

## 2022-02-01 DIAGNOSIS — R53.83 FATIGUE, UNSPECIFIED TYPE: ICD-10-CM

## 2022-02-01 DIAGNOSIS — Z86.010 HISTORY OF COLON POLYPS: ICD-10-CM

## 2022-02-01 DIAGNOSIS — Z00.00 ANNUAL PHYSICAL EXAM: Primary | ICD-10-CM

## 2022-02-01 DIAGNOSIS — F41.1 GENERALIZED ANXIETY DISORDER: ICD-10-CM

## 2022-02-01 DIAGNOSIS — J45.909 ASTHMA DUE TO SEASONAL ALLERGIES: ICD-10-CM

## 2022-02-01 DIAGNOSIS — G47.33 OSA ON CPAP: ICD-10-CM

## 2022-02-01 DIAGNOSIS — R79.89 LOW TESTOSTERONE: ICD-10-CM

## 2022-02-01 DIAGNOSIS — Z99.89 OSA ON CPAP: ICD-10-CM

## 2022-02-01 DIAGNOSIS — Z23 ENCOUNTER FOR IMMUNIZATION: ICD-10-CM

## 2022-02-01 DIAGNOSIS — R68.82 LOW LIBIDO: ICD-10-CM

## 2022-02-01 DIAGNOSIS — I10 BENIGN ESSENTIAL HYPERTENSION: ICD-10-CM

## 2022-02-01 DIAGNOSIS — E66.01 MORBID OBESITY WITH BMI OF 40.0-44.9, ADULT (HCC): ICD-10-CM

## 2022-02-01 DIAGNOSIS — E78.2 MIXED HYPERLIPIDEMIA: ICD-10-CM

## 2022-02-01 DIAGNOSIS — M02.30 REITER'S DISEASE (HCC): ICD-10-CM

## 2022-02-01 PROCEDURE — 90472 IMMUNIZATION ADMIN EACH ADD: CPT

## 2022-02-01 PROCEDURE — 1036F TOBACCO NON-USER: CPT | Performed by: FAMILY MEDICINE

## 2022-02-01 PROCEDURE — 90682 RIV4 VACC RECOMBINANT DNA IM: CPT

## 2022-02-01 PROCEDURE — 90732 PPSV23 VACC 2 YRS+ SUBQ/IM: CPT

## 2022-02-01 PROCEDURE — 99396 PREV VISIT EST AGE 40-64: CPT | Performed by: FAMILY MEDICINE

## 2022-02-01 PROCEDURE — 90471 IMMUNIZATION ADMIN: CPT

## 2022-02-01 PROCEDURE — 99213 OFFICE O/P EST LOW 20 MIN: CPT | Performed by: FAMILY MEDICINE

## 2022-02-01 PROCEDURE — 90715 TDAP VACCINE 7 YRS/> IM: CPT

## 2022-02-01 RX ORDER — ATORVASTATIN CALCIUM 20 MG/1
20 TABLET, FILM COATED ORAL
Qty: 90 TABLET | Refills: 2 | Status: SHIPPED | OUTPATIENT
Start: 2022-02-01

## 2022-02-01 RX ORDER — METFORMIN HYDROCHLORIDE 500 MG/1
1500 TABLET, EXTENDED RELEASE ORAL
Qty: 90 TABLET | Refills: 1 | Status: SHIPPED | OUTPATIENT
Start: 2022-02-01 | End: 2022-03-02 | Stop reason: SDUPTHER

## 2022-02-01 RX ORDER — LISINOPRIL 30 MG/1
30 TABLET ORAL DAILY
Qty: 90 TABLET | Refills: 2 | Status: SHIPPED | OUTPATIENT
Start: 2022-02-01

## 2022-02-01 NOTE — PATIENT INSTRUCTIONS
Wellness Visit for Adults   AMBULATORY CARE:   A wellness visit  is when you see your healthcare provider to get screened for health problems  Your healthcare provider will also give you advice on how to stay healthy  Write down your questions so you remember to ask them  Ask your healthcare provider how often you should have a wellness visit  What happens at a wellness visit:  Your healthcare provider will ask about your health, and your family history of health problems  This includes high blood pressure, heart disease, and cancer  He or she will ask if you have symptoms that concern you, if you smoke, and about your mood  You may also be asked about your intake of medicines, supplements, food, and alcohol  Any of the following may be done:  · Your weight  will be checked  Your height may also be checked so your body mass index (BMI) can be calculated  Your BMI shows if you are at a healthy weight  · Your blood pressure  and heart rate will be checked  Your temperature may also be checked  · Blood and urine tests  may be done  Blood tests may be done to check your cholesterol levels  Abnormal cholesterol levels increase your risk for heart disease and stroke  You may also need a blood or urine test to check for diabetes if you are at increased risk  Urine tests may be done to look for signs of an infection or kidney disease  · A physical exam  includes checking your heartbeat and lungs with a stethoscope  Your healthcare provider may also check your skin to look for sun damage  · Screening tests  may be recommended  A screening test is done to check for diseases that may not cause symptoms  The screening tests you may need depend on your age, gender, family history, and lifestyle habits  For example, colorectal screening may be recommended if you are 48years old or older  Screening tests you need if you are a woman:   · A Pap smear  is used to screen for cervical cancer   Pap smears are usually done every 3 to 5 years depending on your age  You may need them more often if you have had abnormal Pap smear test results in the past  Ask your healthcare provider how often you should have a Pap smear  · A mammogram  is an x-ray of your breasts to screen for breast cancer  Experts recommend mammograms every 2 years starting at age 48 years  You may need a mammogram at age 52 years or younger if you have an increased risk for breast cancer  Talk to your healthcare provider about when you should start having mammograms and how often you need them  Vaccines you may need:   · Get an influenza vaccine  every year  The influenza vaccine protects you from the flu  Several types of viruses cause the flu  The viruses change over time, so new vaccines are made each year  · Get a tetanus-diphtheria (Td) booster vaccine  every 10 years  This vaccine protects you against tetanus and diphtheria  Tetanus is a severe infection that may cause painful muscle spasms and lockjaw  Diphtheria is a severe bacterial infection that causes a thick covering in the back of your mouth and throat  · Get a human papillomavirus (HPV) vaccine  if you are female and aged 23 to 32 or male 23 to 24 and never received it  This vaccine protects you from HPV infection  HPV is the most common infection spread by sexual contact  HPV may also cause vaginal, penile, and anal cancers  · Get a pneumococcal vaccine  if you are aged 72 years or older  The pneumococcal vaccine is an injection given to protect you from pneumococcal disease  Pneumococcal disease is an infection caused by pneumococcal bacteria  The infection may cause pneumonia, meningitis, or an ear infection  · Get a shingles vaccine  if you are 60 or older, even if you have had shingles before  The shingles vaccine is an injection to protect you from the varicella-zoster virus  This is the same virus that causes chickenpox   Shingles is a painful rash that develops in people who had chickenpox or have been exposed to the virus  How to eat healthy:  My Plate is a model for planning healthy meals  It shows the types and amounts of foods that should go on your plate  Fruits and vegetables make up about half of your plate, and grains and protein make up the other half  A serving of dairy is included on the side of your plate  The amount of calories and serving sizes you need depends on your age, gender, weight, and height  Examples of healthy foods are listed below:  · Eat a variety of vegetables  such as dark green, red, and orange vegetables  You can also include canned vegetables low in sodium (salt) and frozen vegetables without added butter or sauces  · Eat a variety of fresh fruits , canned fruit in 100% juice, frozen fruit, and dried fruit  · Include whole grains  At least half of the grains you eat should be whole grains  Examples include whole-wheat bread, wheat pasta, brown rice, and whole-grain cereals such as oatmeal     · Eat a variety of protein foods such as seafood (fish and shellfish), lean meat, and poultry without skin (turkey and chicken)  Examples of lean meats include pork leg, shoulder, or tenderloin, and beef round, sirloin, tenderloin, and extra lean ground beef  Other protein foods include eggs and egg substitutes, beans, peas, soy products, nuts, and seeds  · Choose low-fat dairy products such as skim or 1% milk or low-fat yogurt, cheese, and cottage cheese  · Limit unhealthy fats  such as butter, hard margarine, and shortening  Exercise:  Exercise at least 30 minutes per day on most days of the week  Some examples of exercise include walking, biking, dancing, and swimming  You can also fit in more physical activity by taking the stairs instead of the elevator or parking farther away from stores  Include muscle strengthening activities 2 days each week  Regular exercise provides many health benefits   It helps you manage your weight, and decreases your risk for type 2 diabetes, heart disease, stroke, and high blood pressure  Exercise can also help improve your mood  Ask your healthcare provider about the best exercise plan for you  General health and safety guidelines:   · Do not smoke  Nicotine and other chemicals in cigarettes and cigars can cause lung damage  Ask your healthcare provider for information if you currently smoke and need help to quit  E-cigarettes or smokeless tobacco still contain nicotine  Talk to your healthcare provider before you use these products  · Limit alcohol  A drink of alcohol is 12 ounces of beer, 5 ounces of wine, or 1½ ounces of liquor  · Lose weight, if needed  Being overweight increases your risk of certain health conditions  These include heart disease, high blood pressure, type 2 diabetes, and certain types of cancer  · Protect your skin  Do not sunbathe or use tanning beds  Use sunscreen with a SPF 15 or higher  Apply sunscreen at least 15 minutes before you go outside  Reapply sunscreen every 2 hours  Wear protective clothing, hats, and sunglasses when you are outside  · Drive safely  Always wear your seatbelt  Make sure everyone in your car wears a seatbelt  A seatbelt can save your life if you are in an accident  Do not use your cell phone when you are driving  This could distract you and cause an accident  Pull over if you need to make a call or send a text message  · Practice safe sex  Use latex condoms if are sexually active and have more than one partner  Your healthcare provider may recommend screening tests for sexually transmitted infections (STIs)  · Wear helmets, lifejackets, and protective gear  Always wear a helmet when you ride a bike or motorcycle, go skiing, or play sports that could cause a head injury  Wear protective equipment when you play sports  Wear a lifejacket when you are on a boat or doing water sports      © Copyright [employer] 2021 Information is for End User's use only and may not be sold, redistributed or otherwise used for commercial purposes  All illustrations and images included in CareNotes® are the copyrighted property of [employer] D [employer] M [employer] Inc  or [employer]  The above information is an  only  It is not intended as medical advice for individual conditions or treatments  Talk to your doctor, nurse or pharmacist before following any medical regimen to see if it is safe and effective for you  Obesity   AMBULATORY CARE:   Obesity  is when your body mass index (BMI) is greater than 30  Your healthcare provider will use your height and weight to measure your BMI  The risks of obesity include  many health problems, including injuries or physical disability  You may need tests to check for the following:  · Diabetes    · High blood pressure or high cholesterol    · Heart disease    · Stroke    · Gallbladder or liver disease    · Cancer of the colon, breast, prostate, liver, or kidney    · Sleep apnea    · Arthritis or gout    Seek care immediately if:   · You have a severe headache, confusion, or difficulty speaking  · You have weakness on one side of your body  · You have chest pain, sweating, or shortness of breath  Call your doctor if:   · You have symptoms of gallbladder or liver disease, such as pain in your upper abdomen  · You have knee or hip pain and discomfort while walking  · You have symptoms of diabetes, such as intense hunger and thirst, and frequent urination  · You have symptoms of sleep apnea, such as snoring or daytime sleepiness  · You have questions or concerns about your condition or care  Treatment for obesity  focuses on helping you lose weight to improve your health  Even a small decrease in BMI can reduce the risk for many health problems  Your healthcare provider will help you set a weight-loss goal   · Lifestyle changes  are the first step in treating obesity   These include making healthy food choices and getting regular physical activity  Your healthcare provider may suggest a weight-loss program that involves coaching, education, and therapy  · Medicine  may help you lose weight when it is used with a healthy foods and physical activity  · Surgery  can help you lose weight if you are very obese and have other health problems  There are several types of weight-loss surgery  Ask your healthcare provider for more information  Be successful losing weight:   · Set small, realistic goals  An example of a small goal is to walk for 20 minutes 5 days a week  Anther goal is to lose 5% of your body weight  · Tell friends, family members, and coworkers about your goals  and ask for their support  Ask a friend to lose weight with you, or join a weight-loss support group  · Identify foods or triggers that may cause you to overeat , and find ways to avoid them  Remove tempting high-calorie foods from your home and workplace  Place a bowl of fresh fruit on your kitchen counter  If stress causes you to eat, then find other ways to cope with stress  A counselor or therapist may be able to help you  · Keep a diary to track what you eat and drink  Also write down how many minutes of physical activity you do each day  Weigh yourself once a week and record it in your diary  Eating changes: You will need to eat 500 to 1,000 fewer calories each day than you currently eat to lose 1 to 2 pounds a week  The following changes will help you cut calories:  · Eat smaller portions  Use small plates, no larger than 9 inches in diameter  Fill your plate half full of fruits and vegetables  Measure your food using measuring cups until you know what a serving size looks like  · Eat 3 meals and 1 or 2 snacks each day  Plan your meals in advance  [place] and eat at home most of the time  Eat slowly  Do not skip meals  Skipping meals can lead to overeating later in the day   This can make it harder for you to lose weight  Talk with a dietitian to help you make a meal plan and schedule that is right for you  · Eat fruits and vegetables at every meal   They are low in calories and high in fiber, which makes you feel full  Do not add butter, margarine, or cream sauce to vegetables  Use herbs to season steamed vegetables  · Eat less fat and fewer fried foods  Eat more baked or grilled chicken and fish  These protein sources are lower in calories and fat than red meat  Limit fast food  Dress your salads with olive oil and vinegar instead of bottled dressing  · Limit the amount of sugar you eat  Do not drink sugary beverages  Limit alcohol  Activity changes:  Physical activity is good for your body in many ways  It helps you burn calories and build strong muscles  It decreases stress and depression, and improves your mood  It can also help you sleep better  Talk to your healthcare provider before you begin an exercise program   · Exercise for at least 30 minutes 5 days a week  Start slowly  Set aside time each day for physical activity that you enjoy and that is convenient for you  It is best to do both weight training and an activity that increases your heart rate, such as walking, bicycling, or swimming  · Find ways to be more active  Do yard work and housecleaning  Walk up the stairs instead of using elevators  Spend your leisure time going to events that require walking, such as outdoor festivals or fairs  This extra physical activity can help you lose weight and keep it off  Follow up with your doctor as directed: You may need to meet with a dietitian  Write down your questions so you remember to ask them during your visits  © Copyright [employer] 2021 Information is for End User's use only and may not be sold, redistributed or otherwise used for commercial purposes   All illustrations and images included in CareNotes® are the copyrighted property of A D A M , Inc  or [employer]  The above information is an  only  It is not intended as medical advice for individual conditions or treatments  Talk to your doctor, nurse or pharmacist before following any medical regimen to see if it is safe and effective for you  Cholesterol and Your Health   AMBULATORY CARE:   Cholesterol  is a waxy, fat-like substance  Your body uses cholesterol to make hormones and new cells, and to protect nerves  Cholesterol is made by your body  It also comes from certain foods you eat, such as meat and dairy products  Your healthcare provider can help you set goals for your cholesterol levels  He or she can help you create a plan to meet your goals  Cholesterol level goals: Your cholesterol level goals depend on your risk for heart disease, your age, and your other health conditions  The following are general guidelines:  · Total cholesterol  includes low-density lipoprotein (LDL), high-density lipoprotein (HDL), and triglyceride levels  The total cholesterol level should be lower than 200 mg/dL and is best at about 150 mg/dL  · LDL cholesterol  is called bad cholesterol  because it forms plaque in your arteries  As plaque builds up, your arteries become narrow, and less blood flows through  When plaque decreases blood flow to your heart, you may have chest pain  If plaque completely blocks an artery that brings blood to your heart, you may have a heart attack  Plaque can break off and form blood clots  Blood clots may block arteries in your brain and cause a stroke  The level should be less than 130 mg/dL and is best at about 100 mg/dL  · HDL cholesterol  is called good cholesterol  because it helps remove LDL cholesterol from your arteries  It does this by attaching to LDL cholesterol and carrying it to your liver  Your liver breaks down LDL cholesterol so your body can get rid of it  High levels of HDL cholesterol can help prevent a heart attack and stroke   Low levels of HDL cholesterol can increase your risk for heart disease, heart attack, and stroke  The level should be 60 mg/dL or higher  · Triglycerides  are a type of fat that store energy from foods you eat  High levels of triglycerides also cause plaque buildup  This can increase your risk for a heart attack or stroke  If your triglyceride level is high, your LDL cholesterol level may also be high  The level should be less than 150 mg/dL  Any of the following can increase your risk for high cholesterol:   · Smoking cigarettes    · Being overweight or obese, or not getting enough exercise    · Drinking large amounts of alcohol    · A medical condition such as hypertension (high blood pressure) or diabetes    · Certain genes passed from your parents to you    · Age older than 65 years    What you need to know about having your cholesterol levels checked: Adults 21to 39years of age should have their cholesterol levels checked every 4 to 6 years  Adults 45 years or older should have their cholesterol checked every 1 to 2 years  You may need your cholesterol checked more often, or at a younger age, if you have risk factors for heart disease  You may also need to have your cholesterol checked more often if you have other health conditions, such as diabetes  Blood tests are used to check cholesterol levels  Blood tests measure your levels of triglycerides, LDL cholesterol, and HDL cholesterol  How healthy fats affect your cholesterol levels:  Healthy fats, also called unsaturated fats, help lower LDL cholesterol and triglyceride levels  Healthy fats include the following:  · Monounsaturated fats  are found in foods such as olive oil, canola oil, avocado, nuts, and olives  · Polyunsaturated fats,  such as omega 3 fats, are found in fish, such as salmon, trout, and tuna  They can also be found in plant foods such as flaxseed, walnuts, and soybeans      How unhealthy fats affect your cholesterol levels:  Unhealthy fats increase LDL cholesterol and triglyceride levels  They are found in foods high in cholesterol, saturated fat, and trans fat:  · Cholesterol  is found in eggs, dairy, and meat  · Saturated fat  is found in butter, cheese, ice cream, whole milk, and coconut oil  Saturated fat is also found in meat, such as sausage, hot dogs, and bologna  · Trans fat  is found in liquid oils and is used in fried and baked foods  Foods that contain trans fats include chips, crackers, muffins, sweet rolls, microwave popcorn, and cookies  Treatment  for high cholesterol will also decrease your risk of heart disease, heart attack, and stroke  Treatment may include any of the following:  · Lifestyle changes  may include food, exercise, weight loss, and quitting smoking  You may also need to decrease the amount of alcohol you drink  Your healthcare provider will want you to start with lifestyle changes  Other treatment may be added if lifestyle changes are not enough  Your healthcare provider may recommend you work with a team to manage hyperlipidemia  The team may include medical experts such as a dietitian, an exercise or physical therapist, and a behavior therapist  Your family members may be included in helping you create lifestyle changes  · Medicines  may be given to lower your LDL cholesterol, triglyceride levels, or total cholesterol level  You may need medicines to lower your cholesterol if any of the following is true:    ? You have a history of stroke, TIA, unstable angina, or a heart attack  ? Your LDL cholesterol level is 190 mg/dL or higher  ? You are age 36 to 76 years, have diabetes or heart disease risk factors, and your LDL cholesterol is 70 mg/dL or higher  · Supplements  include fish oil, red yeast rice, and garlic  Fish oil may help lower your triglyceride and LDL cholesterol levels  It may also increase your HDL cholesterol level   Red yeast rice may help decrease your total cholesterol level and LDL cholesterol level  Garlic may help lower your total cholesterol level  Do not take any supplements without talking to your healthcare provider  Food changes you can make to lower your cholesterol levels:  A dietitian can help you create a healthy eating plan  He or she can show you how to read food labels and choose foods low in saturated fat, trans fats, and cholesterol  · Decrease the total amount of fat you eat  Choose lean meats, fat-free or 1% fat milk, and low-fat dairy products, such as yogurt and cheese  Try to limit or avoid red meats  Limit or do not eat fried foods or baked goods, such as cookies  · Replace unhealthy fats with healthy fats  Cook foods in olive oil or canola oil  Choose soft margarines that are low in saturated fat and trans fat  Seeds, nuts, and avocados are other examples of healthy fats  · Eat foods with omega-3 fats  Examples include salmon, tuna, mackerel, walnuts, and flaxseed  Eat fish 2 times per week  Pregnant women should not eat fish that have high levels of mercury, such as shark, swordfish, and tylor mackerel  · Increase the amount of high-fiber foods you eat  High-fiber foods can help lower your LDL cholesterol  Aim to get between 20 and 30 grams of fiber each day  Fruits and vegetables are high in fiber  Eat at least 5 servings each day  Other high-fiber foods are whole-grain or whole-wheat breads, pastas, or cereals, and brown rice  Eat 3 ounces of whole-grain foods each day  Increase fiber slowly  You may have abdominal discomfort, bloating, and gas if you add fiber to your diet too quickly  · Eat healthy protein foods  Examples include low-fat dairy products, skinless chicken and turkey, fish, and nuts  · Limit foods and drinks that are high in sugar  Your dietitian or healthcare provider can help you create daily limits for high-sugar foods and drinks  The limit may be lower if you have diabetes or another health condition   Limits can also help you lose weight if needed  Lifestyle changes you can make to lower your cholesterol levels:   · Maintain a healthy weight  Ask your healthcare provider what a healthy weight is for you  Ask him or her to help you create a weight loss plan if needed  Weight loss can decrease your total cholesterol and triglyceride levels  Weight loss may also help keep your blood pressure at a healthy level  · Be physically active throughout the day  Physical activity, such as exercise, can help lower your total cholesterol level and maintain a healthy weight  Physical activity can also help increase your HDL cholesterol level  Work with your healthcare provider to create an program that is right for you  Get at least 30 to 40 minutes of moderate physical activity most days of the week  Examples of exercise include brisk walking, swimming, or biking  Also include strength training at least 2 times each week  Your healthcare providers can help you create a physical activity plan  · Do not smoke  Nicotine and other chemicals in cigarettes and cigars can raise your cholesterol levels  Ask your healthcare provider for information if you currently smoke and need help to quit  E-cigarettes or smokeless tobacco still contain nicotine  Talk to your healthcare provider before you use these products  · Limit or do not drink alcohol  Alcohol can increase your triglyceride levels  Ask your healthcare provider before you drink alcohol  Ask how much is okay for you to drink in 24 hours or 1 week  Follow up with your doctor as directed:  Write down your questions so you remember to ask them during your visits  © Copyright [employer] 2021 Information is for End User's use only and may not be sold, redistributed or otherwise used for commercial purposes   All illustrations and images included in CareNotes® are the copyrighted property of A [employer]  or [facility] Patricia Mckeon   The above information is an  only  It is not intended as medical advice for individual conditions or treatments  Talk to your doctor, nurse or pharmacist before following any medical regimen to see if it is safe and effective for you      Increase Metformin XR 500mg every 3 days stomach tolerates:  1 pill in AM; 2 pills in AM; 3 pills in AM

## 2022-02-01 NOTE — PROGRESS NOTES
Assessment/Plan:  Problem List Items Addressed This Visit        Endocrine    IFG (impaired fasting glucose)     New  Start Metformin XR 500mg 3 pills QD  Recommend lifestyle modifications  Relevant Medications    metFORMIN (GLUCOPHAGE-XR) 500 mg 24 hr tablet    Other Relevant Orders    Comprehensive metabolic panel    Ambulatory Referral to Diabetic Education    Comprehensive metabolic panel    Hemoglobin A1C       Respiratory    RUKHSANA on CPAP     Continue CPAP  Management per Sleep Medicine  Asthma due to seasonal allergies     Stable  Management per asthma/allergy-overdue for appointment  Cardiovascular and Mediastinum    Benign essential hypertension     Stable  Check blood pressure outside of office  Recommend lifestyle modifications  Relevant Medications    lisinopril (ZESTRIL) 30 mg tablet    Other Relevant Orders    Ambulatory Referral to Diabetic Education    Comprehensive metabolic panel       Musculoskeletal and Integument    Soledad's syndrome     Stable  Other    Mixed hyperlipidemia     Stable  Continue Lipitor 20 mg nightly  Recommend lifestyle modifications  Relevant Medications    atorvastatin (LIPITOR) 20 mg tablet    Other Relevant Orders    Ambulatory Referral to Diabetic Education    Comprehensive metabolic panel    Lipid panel    TSH, 3rd generation with Free T4 reflex    LDL cholesterol, direct    Generalized anxiety disorder     Improved s/p group home  Per patient request, decrease Cymbalta 60mg daily  Continue Rexulti 2 mg nightly  Relevant Medications    Brexpiprazole (Rexulti) 2 MG tablet    Other Relevant Orders    TSH, 3rd generation with Free T4 reflex    Bipolar 2 disorder ([facility] [address] )     Improved s/p group home  Per patient request, decrease Cymbalta 60mg daily  Continue Rexulti 2 mg nightly            Relevant Medications    Brexpiprazole (Rexulti) 2 MG tablet    Other Relevant Orders    TSH, 3rd generation with Free T4 reflex    Morbid obesity ([facility] [address] )     Improved  Recommend lifestyle modifications  Relevant Orders    Ambulatory Referral to Diabetic Education    TSH, 3rd generation with Free T4 reflex    History of colon polyps     Colonoscopy up to date  Other Visit Diagnoses     Annual physical exam    -  Primary    Encounter for immunization        Relevant Orders    influenza vaccine, quadrivalent, recombinant, PF, 0 5 mL, for patients 18 yr+ (FLUBLOK) (Completed)    TDAP VACCINE GREATER THAN OR EQUAL TO 6YO IM (Completed)    PNEUMOCOCCAL POLYSACCHARIDE VACCINE 23-VALENT =>3YO SQ IM (Completed)    Morbid obesity with BMI of 40 0-44 9, adult ([facility] [address] )        Relevant Orders    Ambulatory Referral to Diabetic Education    TSH, 3rd generation with Free T4 reflex    Low testosterone        Relevant Orders    Ambulatory Referral to Urology    Low libido        Relevant Medications    Brexpiprazole (Rexulti) 2 MG tablet    Other Relevant Orders    Ambulatory Referral to Urology    Fatigue, unspecified type        Relevant Orders    Ambulatory Referral to Urology           Return in about 4 months (around 6/1/2022) for 4mo - IFG, HTN, HL, Anx, BPD, Low T, RUKHSANA, M Obesity, Labs  Future Appointments   Date Time Provider Oh Clement   3/8/2022  1:30 PM Marj Farmer RD DIAB ED [facility]   3/9/2022  9:30 AM Bynum BullBENITO kessler DIAB ED [facility]   3/16/2022  9:30 AM Bynummaryam Camargo RD DIAB ED [facility]   3/23/2022  9:30 AM Bynum Bullcheco, RD DIAB ED [facility]   3/30/2022  9:30 AM Bynum Bullcheco, RD DIAB ED [facility]   6/1/2022  2:40 PM Nydia Dong DO [facility]        Subjective:     Yovani Lundberg is a 63 y o  male who presents today for a follow-up on his chronic medical conditions          HPI:  Chief Complaint   Patient presents with    Physical Exam     lowering symbolta     Medication Refill     none    Labs Only     completed    HM     pneumo?, ok for flu/tdap      -- Above per clinical staff and reviewed  --      HPI      Today:      Return in about 4 months (around 12/31/2021) for Physical / 4mo - HTN, HL, Anx, BPD, Low T, RUKHSANA, Obesity, Labs           4mo OV    Retired late 9/21        Patient desires PSA - Last PSA 1 2 on 6/29/20  Brandyn Keen declines BRITTANY       Low Testosterone - x 2 6/29/20 and 11/25/20   Low libido   Denies ED   Previously referred to Urology on 12/1/20             Morbid Obesity - Watching diet - avoiding salt in food, but eating increased portions of healthy good   No Regular exercise - Previously Swimming for 30 minutes, 4 days per week; previously Walking with wife 45 minutes, 4 times per week; bike riding 35 minutes, 2 times per week      HTN - On Lisinopril 30mg QD   No higher dose or other HTN Rx previously   No BP check outside of office  Brandyn Keen has an arm BP cuff at home   Stable Echo 10/10/19        Hyperlipidemia - On Lipitor 20mg QHS   No higher doses   Unsure of other statin name that he had tried previously - ineffective   s/p clean cardiac cath 10/4/19        RUKHSANA on CPAP - Uses CPAP regularly   Management per Sleep Specialist on [address] - Dr Jina Maldonado  Last seen 12/21  Next appt 12/22  Last sleep study 2017?, no weight changes in the interim        Bipolar Disorder - Feels mood is improved  He would like to decrease his Cymbalta dose from 90mg QD due to decreased stress in care home  Feels motivation is good   On Cymbalta 90mg QD  On Rexulti 2mg QHS - has not been on higher doses   D/C Lamictal due to hives   Good social supports   Last counseling 2018 - helpful   No SI/HI/AH/VH         Anxiety - On increased Cymbalta 90mg QD   He no longer feels drained by his anxiety due to increased work stressors, and is able to cope better   Feels 90% improved   Previously on Zoloft - ineffective           PHQ-2/9 Depression Screening    Little interest or pleasure in doing things: 0 - not at all  Feeling down, depressed, or hopeless: 0 - not at all  Trouble falling or staying asleep, or sleeping too much: 0 - not at all  Feeling tired or having little energy: 1 - several days  Poor appetite or overeatin - not at all  Feeling bad about yourself - or that you are a failure or have let yourself or your family down: 0 - not at all  Trouble concentrating on things, such as reading the newspaper or watching television: 0 - not at all  Moving or speaking so slowly that other people could have noticed  Or the opposite - being so fidgety or restless that you have been moving around a lot more than usual: 0 - not at all  Thoughts that you would be better off dead, or of hurting yourself in some way: 0 - not at all  PHQ-2 Score: 0  PHQ-2 Interpretation: Negative depression screen  PHQ-9 Score: 1   PHQ-9 Interpretation: No or Minimal depression          VENANCIO-7 Flowsheet Screening      Most Recent Value   Over the last 2 weeks, how often have you been bothered by any of the following problems? Feeling nervous, anxious, or on edge 0   Not being able to stop or control worrying 0   Worrying too much about different things 0   Trouble relaxing 0   Being so restless that it is hard to sit still 0   Becoming easily annoyed or irritable 0   Feeling afraid as if something awful might happen 0   VENANCIO-7 Total Score 0        MDQ:  3, Synchronous, No Problem        Asthma - Triggered by allergies   Uses Albuterol HFA PRN and Breo 100-25 1 puff QD PRN   Last use 3/20   No ICS previously  Maddi Spikes rafa  per Asthma / Darci Arms for appointment   ACT 25 on 21        Soledad's Syndrome - x 2   S/p Antibiotics   Never followed c Rheum        H/O Colon Polyps - Colonoscopy per XR 9294 Isidro Tuttle - next colonoscopy         Reviewed:  Labs 22     Last Uro [place]   S/p cysto   Last prostate exam years ago -  c colonoscopy - BPH       Overdue for Dentist   Catia Hung q2 years                    The following portions of the patient's history were reviewed and updated as appropriate: allergies, current medications, past family history, past medical history, past social history, past surgical history and problem list       Review of Systems   Constitutional: Positive for fatigue  Negative for appetite change, chills, diaphoresis and fever  Respiratory: Negative for chest tightness and shortness of breath  Cardiovascular: Negative for chest pain  Gastrointestinal: Negative for abdominal pain, blood in stool, diarrhea, nausea and vomiting  Genitourinary: Negative for dysuria  Current Outpatient Medications   Medication Sig Dispense Refill    atorvastatin (LIPITOR) 20 mg tablet Take 1 tablet (20 mg total) by mouth daily at bedtime 90 tablet 2    Brexpiprazole (Rexulti) 2 MG tablet Take 1 tablet (2 mg total) by mouth daily at bedtime 90 tablet 2    cholecalciferol (VITAMIN D3) 1,000 units tablet Take 1,000 Units by mouth daily      DULoxetine (CYMBALTA) 60 mg delayed release capsule Take 1 capsule (60 mg total) by mouth daily Take with 30mg pill for a total of 90mg daily  90 capsule 2    lisinopril (ZESTRIL) 30 mg tablet Take 1 tablet (30 mg total) by mouth daily 90 tablet 2    metFORMIN (GLUCOPHAGE-XR) 500 mg 24 hr tablet Take 3 tablets (1,500 mg total) by mouth daily with breakfast 90 tablet 1     No current facility-administered medications for this visit  Objective:  /78   Pulse (!) 111   Temp 97 5 °F (36 4 °C)   Resp 16   Ht 5' 7" (1 702 m)   Wt 116 kg (256 lb 6 4 oz)   SpO2 98%   BMI 40 16 kg/m²    Wt Readings from Last 3 Encounters:   02/03/22 117 kg (258 lb)   02/01/22 116 kg (256 lb 6 4 oz)   08/31/21 119 kg (261 lb 6 4 oz)      BP Readings from Last 3 Encounters:   02/01/22 126/78   08/31/21 110/68   06/08/21 120/70          Physical Exam  Vitals and nursing note reviewed  Constitutional:       Appearance: Normal appearance  He is well-developed  He is obese  HENT:      Head: Normocephalic and atraumatic  Right Ear: Tympanic membrane, ear canal and external ear normal  There is impacted cerumen  Left Ear: Tympanic membrane, ear canal and external ear normal  There is impacted cerumen  Nose: Nose normal       Right Sinus: No maxillary sinus tenderness or frontal sinus tenderness  Left Sinus: No maxillary sinus tenderness or frontal sinus tenderness  Mouth/Throat:      Mouth: Mucous membranes are moist       Pharynx: Oropharynx is clear  Uvula midline  Tonsils: No tonsillar exudate  Eyes:      Extraocular Movements: Extraocular movements intact  Conjunctiva/sclera: Conjunctivae normal       Pupils: Pupils are equal, round, and reactive to light  Cardiovascular:      Rate and Rhythm: Normal rate and regular rhythm  Pulses: Normal pulses  Heart sounds: Normal heart sounds  Pulmonary:      Effort: Pulmonary effort is normal       Breath sounds: Normal breath sounds  Abdominal:      General: Bowel sounds are normal  There is no distension  Palpations: Abdomen is soft  There is no mass  Tenderness: There is no abdominal tenderness  There is no guarding or rebound  Musculoskeletal:         General: No swelling or tenderness  Cervical back: Neck supple  Right lower leg: No edema  Left lower leg: No edema  Lymphadenopathy:      Cervical: No cervical adenopathy  Skin:     Findings: No rash  Neurological:      General: No focal deficit present  Mental Status: He is alert and oriented to person, place, and time  Psychiatric:         Mood and Affect: Mood normal          Behavior: Behavior normal          Thought Content:  Thought content normal          Judgment: Judgment normal          Lab Results:      Lab Results   Component Value Date    WBC 6 7 08/28/2021    HGB 15 7 08/28/2021    HCT 46 0 08/28/2021     08/28/2021    TRIG 352 (H) 01/28/2022    HDL 40 01/28/2022    LDLDIRECT 113 (H) 01/28/2022    ALT 38 01/28/2022    AST 19 01/28/2022    K 4 0 01/28/2022     01/28/2022    CREATININE 1 22 01/28/2022    BUN 22 01/28/2022    CO2 26 01/28/2022    PSA 1 2 08/28/2021    INR 1 0 10/01/2019    GLUF 100 (H) 01/28/2022    HGBA1C 5 7 (H) 01/28/2022     No results found for: URICACID  Invalid input(s): BASENAME Vitamin D    No results found  POCT Labs      BMI Counseling: Body mass index is 40 16 kg/m²  The BMI is above normal  Nutrition recommendations include encouraging healthy choices of fruits and vegetables  Exercise recommendations include exercising 3-5 times per week  Rationale for BMI follow-up plan is due to patient being overweight or obese

## 2022-02-03 ENCOUNTER — TELEPHONE (OUTPATIENT)
Dept: DIABETES SERVICES | Facility: CLINIC | Age: 62
End: 2022-02-03

## 2022-02-03 ENCOUNTER — OFFICE VISIT (OUTPATIENT)
Dept: DIABETES SERVICES | Facility: CLINIC | Age: 62
End: 2022-02-03
Payer: COMMERCIAL

## 2022-02-03 VITALS — BODY MASS INDEX: 40.49 KG/M2 | HEIGHT: 67 IN | WEIGHT: 258 LBS

## 2022-02-03 DIAGNOSIS — E78.2 MIXED HYPERLIPIDEMIA: ICD-10-CM

## 2022-02-03 DIAGNOSIS — I10 BENIGN ESSENTIAL HYPERTENSION: ICD-10-CM

## 2022-02-03 DIAGNOSIS — R73.01 IFG (IMPAIRED FASTING GLUCOSE): ICD-10-CM

## 2022-02-03 DIAGNOSIS — E66.01 MORBID OBESITY WITH BMI OF 40.0-44.9, ADULT (HCC): ICD-10-CM

## 2022-02-03 DIAGNOSIS — R73.01 IFG (IMPAIRED FASTING GLUCOSE): Primary | ICD-10-CM

## 2022-02-03 DIAGNOSIS — E66.01 MORBID OBESITY (HCC): ICD-10-CM

## 2022-02-03 PROCEDURE — 3008F BODY MASS INDEX DOCD: CPT | Performed by: FAMILY MEDICINE

## 2022-02-03 PROCEDURE — G0108 DIAB MANAGE TRN  PER INDIV: HCPCS | Performed by: DIETITIAN, REGISTERED

## 2022-02-03 NOTE — ASSESSMENT & PLAN NOTE
Improved s/p long-term  Per patient request, decrease Cymbalta 60mg daily  Continue Rexulti 2 mg nightly

## 2022-02-03 NOTE — ASSESSMENT & PLAN NOTE
Improved s/p longterm  Per patient request, decrease Cymbalta 60mg daily  Continue Rexulti 2 mg nightly

## 2022-02-03 NOTE — PROGRESS NOTES
Assessment/Plan:  Problem List Items Addressed This Visit        Endocrine    IFG (impaired fasting glucose)     New  Start Metformin XR 500mg 3 pills QD  Recommend lifestyle modifications  Relevant Medications    metFORMIN (GLUCOPHAGE-XR) 500 mg 24 hr tablet    Other Relevant Orders    Comprehensive metabolic panel    Ambulatory Referral to Diabetic Education    Comprehensive metabolic panel    Hemoglobin A1C       Respiratory    RUKHSANA on CPAP     Continue CPAP  Management per Sleep Medicine  Asthma due to seasonal allergies     Stable  Management per asthma/allergy-overdue for appointment  Cardiovascular and Mediastinum    Benign essential hypertension     Stable  Check blood pressure outside of office  Recommend lifestyle modifications  Relevant Medications    lisinopril (ZESTRIL) 30 mg tablet    Other Relevant Orders    Ambulatory Referral to Diabetic Education    Comprehensive metabolic panel       Musculoskeletal and Integument    Soledad's syndrome     Stable  Other    Mixed hyperlipidemia     Stable  Continue Lipitor 20 mg nightly  Recommend lifestyle modifications  Relevant Medications    atorvastatin (LIPITOR) 20 mg tablet    Other Relevant Orders    Ambulatory Referral to Diabetic Education    Comprehensive metabolic panel    Lipid panel    TSH, 3rd generation with Free T4 reflex    LDL cholesterol, direct    Generalized anxiety disorder     Improved s/p [facility]  Per patient request, decrease Cymbalta 60mg daily  Continue Rexulti 2 mg nightly  Relevant Medications    Brexpiprazole (Rexulti) 2 MG tablet    Other Relevant Orders    TSH, 3rd generation with Free T4 reflex    Bipolar 2 disorder ([facility] [address] )     Improved s/p FDC  Per patient request, decrease Cymbalta 60mg daily  Continue Rexulti 2 mg nightly            Relevant Medications    Brexpiprazole (Rexulti) 2 MG tablet    Other Relevant Orders    TSH, 3rd generation with Free T4 reflex    [facility]     Improved  Recommend lifestyle modifications  Relevant Orders    Ambulatory Referral to Diabetic Education    TSH, 3rd generation with Free T4 reflex    History of colon polyps     Colonoscopy up to date  Other Visit Diagnoses     Annual physical exam    -  Primary    Encounter for immunization        Relevant Orders    influenza vaccine, quadrivalent, recombinant, PF, 0 5 mL, for patients 18 yr+ (FLUBLOK) (Completed)    TDAP VACCINE GREATER THAN OR EQUAL TO 6YO IM (Completed)    PNEUMOCOCCAL POLYSACCHARIDE VACCINE 23-VALENT =>1YO SQ IM (Completed)    Morbid obesity with BMI of 40 0-44 9, adult ([facility] [address] )        Relevant Orders    Ambulatory Referral to Diabetic Education    TSH, 3rd generation with Free T4 reflex    Low testosterone        Relevant Orders    Ambulatory Referral to Urology    Low libido        Relevant Medications    Brexpiprazole (Rexulti) 2 MG tablet    Other Relevant Orders    Ambulatory Referral to Urology    Fatigue, unspecified type        Relevant Orders    Ambulatory Referral to Urology           Return in about 4 months (around 6/1/2022) for 4mo - IFG, HTN, HL, Anx, BPD, Low T, RUKHSANA, M Obesity, Labs  Future Appointments   Date Time Provider Oh Clement   3/8/2022  1:30 PM Zehra Up RD DIAB ED [facility]   3/9/2022  9:30 AM Jatin Jolley RD DIAB ED QTR [facility]   3/16/2022  9:30 AM Jatin Jolley RD DIAB ED QTR [facility]   3/23/2022  9:30 AM Jatin Jolley RD DIAB ED QTR [facility]   3/30/2022  9:30 AM Jatin Jolley RD DIAB ED QTR [facility]   6/1/2022  2:40 PM Jay Kidd DO [facility]        Subjective:     [facility] is a 63 y o  male who presents today for a follow-up on his chronic medical conditions          HPI:  Chief Complaint   Patient presents with    Physical Exam     lowering symbolta     Medication Refill     none    Labs Only     completed         pneumo?, ok for flu/tdap      -- Above per clinical staff and reviewed  --      HPI      Today:      Physical    Watching diet  No exercise  Last Uro in [place], [place] c [place]   S/p cysto   Last prostate exam years ago - 2017 c colonoscopy - BPH        Overdue for Dentist   Millie Hung q2 years  Health Maintenance   Topic Date Due    BMI: Followup Plan  02/01/2023    Annual Physical  02/01/2023    BMI: Adult  02/03/2023    Colorectal Cancer Screening  12/10/2023    Pneumococcal Vaccine: Pediatrics (0 to 5 Years) and At-Risk Patients (6 to 59 Years) (2 of 2 - PPSV23) 02/01/2027    DTaP,Tdap,and Td Vaccines (3 - Td or Tdap) 02/01/2032    HIV Screening  Completed    Hepatitis C Screening  Completed    Influenza Vaccine  Completed    COVID-19 Vaccine  Completed    HIB Vaccine  Aged Out    Hepatitis B Vaccine  Aged Out    IPV Vaccine  Aged Out    Hepatitis A Vaccine  Aged Out    Meningococcal ACWY Vaccine  Aged Out    HPV Vaccine  Aged Out         The following portions of the patient's history were reviewed and updated as appropriate: allergies, current medications, past family history, past medical history, past social history, past surgical history and problem list       Review of Systems     See other note  Current Outpatient Medications   Medication Sig Dispense Refill    atorvastatin (LIPITOR) 20 mg tablet Take 1 tablet (20 mg total) by mouth daily at bedtime 90 tablet 2    Brexpiprazole (Rexulti) 2 MG tablet Take 1 tablet (2 mg total) by mouth daily at bedtime 90 tablet 2    cholecalciferol (VITAMIN D3) 1,000 units tablet Take 1,000 Units by mouth daily      DULoxetine (CYMBALTA) 60 mg delayed release capsule Take 1 capsule (60 mg total) by mouth daily Take with 30mg pill for a total of 90mg daily   90 capsule 2    lisinopril (ZESTRIL) 30 mg tablet Take 1 tablet (30 mg total) by mouth daily 90 tablet 2    metFORMIN (GLUCOPHAGE-XR) 500 mg 24 hr tablet Take 3 tablets (1,500 mg total) by mouth daily with breakfast 90 tablet 1     No current facility-administered medications for this visit  Objective:  /78   Pulse (!) 111   Temp 97 5 °F (36 4 °C)   Resp 16   Ht 5' 7" (1 702 m)   Wt 116 kg (256 lb 6 4 oz)   SpO2 98%   BMI 40 16 kg/m²    Wt Readings from Last 3 Encounters:   02/03/22 117 kg (258 lb)   02/01/22 116 kg (256 lb 6 4 oz)   08/31/21 119 kg (261 lb 6 4 oz)      BP Readings from Last 3 Encounters:   02/01/22 126/78   08/31/21 110/68   06/08/21 120/70          Physical Exam     Vitals and nursing note reviewed  Constitutional:       Appearance: Normal appearance  He is well-developed  He is obese  HENT:      Head: Normocephalic and atraumatic  Right Ear: Tympanic membrane, ear canal and external ear normal  There is impacted cerumen  Left Ear: Tympanic membrane, ear canal and external ear normal  There is impacted cerumen  Nose: Nose normal       Right Sinus: No maxillary sinus tenderness or frontal sinus tenderness  Left Sinus: No maxillary sinus tenderness or frontal sinus tenderness  Mouth/Throat:      Mouth: Mucous membranes are moist       Pharynx: Oropharynx is clear  Uvula midline  Tonsils: No tonsillar exudate  Eyes:      Extraocular Movements: Extraocular movements intact  Conjunctiva/sclera: Conjunctivae normal       Pupils: Pupils are equal, round, and reactive to light  Cardiovascular:      Rate and Rhythm: Normal rate and regular rhythm  Pulses: Normal pulses  Heart sounds: Normal heart sounds  Pulmonary:      Effort: Pulmonary effort is normal       Breath sounds: Normal breath sounds  Abdominal:      General: Bowel sounds are normal  There is no distension  Palpations: Abdomen is soft  There is no mass  Tenderness: There is no abdominal tenderness  There is no guarding or rebound  Musculoskeletal:         General: No swelling or tenderness  Cervical back: Neck supple  Right lower leg: No edema        Left lower leg: No edema  Lymphadenopathy:      Cervical: No cervical adenopathy  Skin:     Findings: No rash  Neurological:      General: No focal deficit present  Mental Status: He is alert and oriented to person, place, and time  Psychiatric:         Mood and Affect: Mood normal          Behavior: Behavior normal          Thought Content: Thought content normal          Judgment: Judgment normal      Lab Results:      Lab Results   Component Value Date    WBC 6 7 08/28/2021    HGB 15 7 08/28/2021    HCT 46 0 08/28/2021     08/28/2021    TRIG 352 (H) 01/28/2022    HDL 40 01/28/2022    LDLDIRECT 113 (H) 01/28/2022    ALT 38 01/28/2022    AST 19 01/28/2022    K 4 0 01/28/2022     01/28/2022    CREATININE 1 22 01/28/2022    BUN 22 01/28/2022    CO2 26 01/28/2022    PSA 1 2 08/28/2021    INR 1 0 10/01/2019    GLUF 100 (H) 01/28/2022    HGBA1C 5 7 (H) 01/28/2022     No results found for: URICACID  Invalid input(s): BASENAME Vitamin D    No results found       POCT Labs

## 2022-02-03 NOTE — PATIENT INSTRUCTIONS
Class Assessment AVS    You are scheduled to attend Living Well with Diabetes Classes starting: March 9, 2022 at 9:30 am   Classes are taught using [employer]  A link for class and class materials will be e mailed prior to each of the 4 classes  Testing frequency: Pair test meals (before a meal and 2 hours after the same meal) every other day (3 days per week) rotating a different meal each time  Goal Blood Sugars:   Premeal , even better <110  2hr after a meal <180, even better <140  A1C <7%, even better <6 5%  Thank you for coming to the [facility] for education today  Please feel free to call with any questions or concerns      Jaren Pacheco, [address] [facility] [address] [facility] [id number]

## 2022-02-03 NOTE — PROGRESS NOTES
Type 2 Diabetes Class Assessment    HPI: Met with Jeny Keyes  for DSME Initial visit  Shavon Srinivasan was recently diagnosed with Prediabetes  Most recent HbA1c 5 7%  He has not been self monitoring BG  Provided education on SMBG and the benefits  Shavon Srinivasan is agreeable to starting SMBG  Provided glucometer today, instructed on use and had Aldair check his own BG in office during appointment  Result was  117 mg/dL which was about 2 5 hours postprandial  Messaged PCP to call in refills for strips and lancets  Shavon Srinivasan also has referral for MNT which he is also interested in and will get scheduled for at the conclusion of today's visit  Recently he has been having oatmeal or eggs for breakfast, protein smoothie for lunch, and "a regular dinner"  Provided basic nutrition guidelines of 45-60 g CHO per meal, 3 meals per day about 4-5 hours apart, with about the same amount of food at each meal  He states that he has been prescribed metformin but has not started taking it yet due to wanting to hear my opinion on it  Discussed basic pathophysiology of T2DM and how metformin improves how cells use insulin and decrease hepatic glucose release  Shavon Srinivasan states he will start taking metformin  Shavon Srinivasan verbalized good understanding of topics and recommendations discussed today and is scheduled to begin taking group diabetes classes starting March 9, 2022  He will call with any questions  Diabetes Assessment  Visit Type: Initial visit  Present at Session: patient   Special Learning Needs: No  Barriers to Learning: no barriers    How do you feel about making lifestyle changes at this time? "Necessary "  How would you rate your current knowledge of diabetes? fair  How confident are you that will be able to take better control of your diabetes?: good    How long have you had diabetes?  Prediabetes diagnosed 1/28/22  Have you had diabetes education in the past?: No  Do you have any family members with diabetes?: Yes - father type 2  Do you monitor your blood sugar? no  Type of blood sugar monitor: no  How old is your meter?: did not have one, gave new one today  One Touch Verio Reflect  How often do you test your blood sugars?:n/a  Do you keep a written record of your blood sugars? No   Blood sugar log with patient today and reviewed by educator?: No   Blood Sugar ranges:    Fasting: n/a   Before meals: n/a   2 hours after meals: today in office 117 mg/dL at 2-2 5 hours after lunch  Any financial concerns pertaining to your diabetes supplies, medication or care?: No  Have you ever experienced hypoglycemia?:  No  Have you ever been hospitalized or gone to the ER due to your blood sugars?: No  How do you treat low blood sugars?: orange juice or something else to get an instant sugar rush  How do you treat high blood sugars? Does not know  Educated on exercise and drinking water  Do you wear a Diabetes I D ?: no  Where do you dispose of your sharps (needles,lancetes)? : toss it in garbage can  Educated on PA sharps disposal     Ht Readings from Last 1 Encounters:   02/01/22 5' 7" (1 702 m)     Wt Readings from Last 3 Encounters:   02/01/22 116 kg (256 lb 6 4 oz)   08/31/21 119 kg (261 lb 6 4 oz)   06/08/21 116 kg (255 lb 9 6 oz)        There is no height or weight on file to calculate BMI       Lab Results   Component Value Date    HGBA1C 5 7 (H) 01/28/2022    HGBA1C 5 5 11/25/2020       No results found for: CHOL  Lab Results   Component Value Date    HDL 40 01/28/2022    HDL 39 (L) 08/28/2021    HDL 37 (L) 03/30/2021     Lab Results   Component Value Date    LDLCALC 90 01/28/2022    LDLCALC 124 (H) 08/28/2021    LDLCALC 114 (H) 03/30/2021     Lab Results   Component Value Date    TRIG 352 (H) 01/28/2022    TRIG 360 (H) 08/28/2021    TRIG 327 (H) 03/30/2021     No results found for: CHOLHDL  No results found for: Jessica Karen    Weight Change: No    Diet Assessment    Do you follow any special diet presently?: No, but trying to do protein smoothies at lunch and eating a regular dinner  Has eggs or oatmeal for breakfast   Who cooks at home?:  spouse  Who does the grocery shopping?: spouse   How frequently do you eat out?: once per week at most    Activity Assessment    Exercise: has not been for a little while now  Was previously swimming and walking  Started to walk again, planning on trying to do 30 minutes per day every day  Lifestyle/Social Assessment    Racial/ethnic group:                                       Primary Language: English  Marital Status:   Education Level: High School Graduate   Work status: Retired  Type of job and hours: n/a  Who lives in your household?: spouse  Who is you primary support person(s): spouse   Describe your quality of life currently?: excellent  Any concerns for your safety?: No  Any Church or cultural practices that may affect your diabetes care: No  Do you have a decrease or loss of hearing?: No  Do you have a decrease or loss of vision?: No  When was the last time you had an ophthalmology exam?: December 2021, normal  When was the last time you had dental exam?: 3 years ago  Do you check your feet for cracks, sores, debris?: No  When was the last time you had podiatry or foot exam?: does not remember, been a while  Last flu shot?: February 1, 2022  Pneumonia shot?: Yes      The patient's history was reviewed and updated as appropriate: allergies, current medications  Intervention    Diabetes Overview :   Charo Ling was instructed on basic concepts of diabetes, including identifying role of diabetes self management, basic pathophysiology and types of diabetes, A1c and blood sugar targets  Charo Ling has good understanding of material covered  Taking Medications: Instructed patient on action, side effects, efficacy, prescribed dosage and appropriate timing and frequency of administration of his diabetes medication  Charo Ling has good understanding of material covered       Monitoring Blood Sugars  Instructions for Meter Teaching- Patient instructed in the following:  Site selection and skin preparation, Loading strips and lancet device, meter activation, obtaining blood sample, test strip and lancet disposal and recording log book entries  Patient has good understanding of material covered and was able to test their own blood sugar in office today  Comments: Gave and instructed on One Touch Verio Reflect meter, Patient demonstrated use, blood sugar in office 117 mg/dl  at  2:18 pm     Lot #: [id number]  Exp Date: 3/31/2022    Testing frequency: Encouraged pair testing  Test sugars before a meal and 2hr after the same meal, rotating between breakfast, lunch, and dinner  Test sugars twice a day (3 days a week)  Goal Blood Sugars:   Premeal , even better <110  2hr after a meal <180, even better <140  A1C <7%, even better <6 5%  Hypoglycemia: Instructed patient on definition/risk of hypoglycemia, treatment, causes/symptoms, when to notify provider of lows, prevention of hypoglycemia and exercise precautions  Comments: Aldair verbalizes understanding of hypoglycemia concepts      Physical Activity: Discussed benefits of physical activity to optimize blood glucose control, encouraged activity at patient is physically able  Always consult a physician prior to starting an exercise program   Comments: Yana Navarro understanding of hypoglycemia concepts        Diabetes Education Record  Aldair received the following handouts: s/s of hypoglycemia and hyperglycemia, 15/15 rule, Know your blood glucose numbers, nutrition guidelines for diabetes, PA sharps disposal, diabetes zone tool, 2022 LW class schedule      Patient response to instruction    Comprehensiongood  Motivationgood  Expected Compliancegood    Thank you for referring your patient to [facility], it was a pleasure working with them today   Please feel free to call with any questions or concerns      Start: 2:00 pm  Stop: 3:10 pm  Referred by: DO Danial Spann Antis, [address] [facility] [address] [facility] [address]

## 2022-02-14 NOTE — TELEPHONE ENCOUNTER
Called to request test strips - order have been placed by PCP along with lancets  Faxed forms to request pharmacy

## 2022-03-02 ENCOUNTER — LAB (OUTPATIENT)
Dept: LAB | Facility: CLINIC | Age: 62
End: 2022-03-02
Payer: COMMERCIAL

## 2022-03-02 DIAGNOSIS — R73.01 IFG (IMPAIRED FASTING GLUCOSE): ICD-10-CM

## 2022-03-02 LAB
ALBUMIN SERPL BCP-MCNC: 3.8 G/DL (ref 3.5–5)
ALP SERPL-CCNC: 49 U/L (ref 46–116)
ALT SERPL W P-5'-P-CCNC: 30 U/L (ref 12–78)
ANION GAP SERPL CALCULATED.3IONS-SCNC: 10 MMOL/L (ref 4–13)
AST SERPL W P-5'-P-CCNC: 16 U/L (ref 5–45)
BILIRUB SERPL-MCNC: 0.59 MG/DL (ref 0.2–1)
BUN SERPL-MCNC: 18 MG/DL (ref 5–25)
CALCIUM SERPL-MCNC: 9.3 MG/DL (ref 8.3–10.1)
CHLORIDE SERPL-SCNC: 102 MMOL/L (ref 100–108)
CO2 SERPL-SCNC: 28 MMOL/L (ref 21–32)
CREAT SERPL-MCNC: 1.25 MG/DL (ref 0.6–1.3)
GFR SERPL CREATININE-BSD FRML MDRD: 62 ML/MIN/1.73SQ M
GLUCOSE P FAST SERPL-MCNC: 100 MG/DL (ref 65–99)
POTASSIUM SERPL-SCNC: 4.5 MMOL/L (ref 3.5–5.3)
PROT SERPL-MCNC: 7.4 G/DL (ref 6.4–8.2)
SODIUM SERPL-SCNC: 140 MMOL/L (ref 136–145)

## 2022-03-02 PROCEDURE — 80053 COMPREHEN METABOLIC PANEL: CPT

## 2022-03-02 PROCEDURE — 36415 COLL VENOUS BLD VENIPUNCTURE: CPT

## 2022-03-02 RX ORDER — METFORMIN HYDROCHLORIDE 500 MG/1
1500 TABLET, EXTENDED RELEASE ORAL
Qty: 90 TABLET | Refills: 1 | Status: SHIPPED | OUTPATIENT
Start: 2022-03-02 | End: 2022-06-01

## 2022-03-02 NOTE — RESULT ENCOUNTER NOTE
Stable labs  Continue metformin XR  Patient will need refill prior to next appointment  Message sent to patient via [facility] patient portal     Nurse to also call patient

## 2022-03-08 ENCOUNTER — OFFICE VISIT (OUTPATIENT)
Dept: DIABETES SERVICES | Facility: CLINIC | Age: 62
End: 2022-03-08
Payer: COMMERCIAL

## 2022-03-08 VITALS — WEIGHT: 254.2 LBS | HEIGHT: 67 IN | BODY MASS INDEX: 39.9 KG/M2

## 2022-03-08 DIAGNOSIS — R73.01 IMPAIRED FASTING GLUCOSE: Primary | ICD-10-CM

## 2022-03-08 PROCEDURE — 97802 MEDICAL NUTRITION INDIV IN: CPT | Performed by: DIETITIAN, REGISTERED

## 2022-03-08 PROCEDURE — 3008F BODY MASS INDEX DOCD: CPT | Performed by: FAMILY MEDICINE

## 2022-03-08 NOTE — PATIENT INSTRUCTIONS
45-60 grams of carbohydrate per meal  0-15 grams of carbohydrate per snack    3 meals per day, 4-5 hours apart  1-2 snacks per day, at least 2 hours apart from meals    Protein : 8 oz (servings) from pages 9-12 of the portion book  Fats: 5 serving per day from the portion book    Please complete 3 day food record prior to follow-up appointment in 6 weeks

## 2022-03-08 NOTE — PROGRESS NOTES
Medical Nutrition Therapy        Assessment    Visit Type: Initial visit    Chief complaint Impaired Fasting Glucose    HPI:  72-year-old male referred for medical nutrition therapy for impaired fasting glucose  Most recent HbA1c 5 7%  Also has history of essential hypertension, mixed hyperlipidemia, and morbid obesity  Yovani Lundberg was seen previously for initial assessment for diabetes group classes and has returned today for initial medical nutrition therapy appointment  Gary diet history reveals inconsistent carbohydrate intake  Currently meals range from 0 to 75 grams of carbohydrate  Explained basic pathophysiology of diabetes and impact of diet on blood glucose levels  Together we discussed what foods contain CHO, reading a food label, timing of meals and snacks, the role of fiber, serving sizes, and the importance of consistent carbohydrate intake in the appropriate amounts  Used the portion booklet to teach Yovani Lundberg more about food groups and basic carbohydrate counting  Created an individualized meal plan for Aldair with 3 meals and 1 snack providing 45-60 g carb per meal and 15 g carb per snack  T Put together sample meals for Aldair's reference  Yovani Lundberg demonstrated good understanding and will call with any questions prior to follow-up appointment in 6 weeks  Ht Readings from Last 1 Encounters:   02/03/22 5' 7" (1 702 m)     Wt Readings from Last 2 Encounters:   02/03/22 117 kg (258 lb)   02/01/22 116 kg (256 lb 6 4 oz)     Weight Change: Yes 248 lbs on scale at home  Has lost 4 lbs since last diabetes education appt on Feb 3rd    Medical Diagnosis/ICD10:  R73 01 impaired fasting glucose    Barriers to Learning: no barriers    Do you follow any special diet presently?: No  Who shops: spouse  Who cooks: patient and spouse    Food Log: Completed via the method of food recall    Breakfast:wakes 8 am  Eats 8:30 am  Oatmeal (1 5 cups) and a banana (1 whole large) or raisins added in  (4 tbsp)   Large coffee (18-20 oz with non-dairy sugar creamer  Morning Snack: 11 am prunes 3 OR organic granola bar  Lunch:12:30 - 1 pm  Grilled cheese sandwich (thin sliced 12 grain bread, muenster cheese 2 slices, butter)OR a smoothie (fozen fruit -medley  strawberry, pineapple, canatloupe, spinach, flax seed, 3/4 cup yogurt - plain low fat, protein powder, almond milk unsweetened)  Water  Afternoon Snack: 3 pm Herbal tea with blue agave (1 tbsp)  Dinner:5:30-6 pm  Chicken tenderloins (sauteed on stove top with olive oil) with onions and peppers, asparagus  Water  Evening Snack: 8 pm has not been recently  Occasionally popcorn (1 whole microwave bag)  Beverages: water, herbal tea, coffee, smoothie  Eating out/Take out:once per week takeout  Exercise 3 times per week walking for 30 min  Calorie needs 1827 kcals/day Carbs:  45-60 g/meal, 15 g/snack         Nutrition Diagnosis:  Inconsistent carbohydrate intake  intake related to Food and nutrition related knowledge deficit concerning appropriate amount and timing of carbohydrate intake as evidenced by  Estimated carbohydrate intake that is different from recommended types or ingested on an irregular basis    Intervention: plate method, increased fiber intake, label reading, carbohydrate counting, increased plant based foods, meal timing, meal planning, individualized meal plan, monitoring portion control and food diary     Treatment Goals: Patient understands education and recommendations, Patient will monitor fat intake, Patient will monitor portion control, Patient will consume 25-35 grams of fiber a day, Patient will increase their intake of plant based foods and Patient will count carbohydrates    Monitoring and evaluation:    Term code indicator  FH 1 6 3 Carbohydrate Intake Criteria: 45-60 g carbohydrate per meal, 15 g carbohydrate per snack  Term code indicator  FH 4 4 Mealtime Behavior Criteria: Three meals per day, 4-5 hours apart    One snack per day, at least 2 hours apart from meals  Patients Response to Instruction:  Araseli Ordaz  Expected Compliancegood    Thank you for coming to the [facility] for education today  Please feel free to call with any questions or concerns  Start:  1:25 p m  Stop:  2:39 p m    Referred by:  DO Arminda Valencia, [address] [facility] [place] [address]

## 2022-03-09 ENCOUNTER — TELEMEDICINE (OUTPATIENT)
Dept: DIABETES SERVICES | Facility: HOSPITAL | Age: 62
End: 2022-03-09

## 2022-03-09 DIAGNOSIS — R73.01 IFG (IMPAIRED FASTING GLUCOSE): Primary | ICD-10-CM

## 2022-03-09 NOTE — PROGRESS NOTES
Pt note charged due to diagnosis code, not appropriate for diabetes class  Virtual Regular Visit    Verification of patient location:    Patient is located in the following state in which I hold an active license [place]      Assessment/Plan:    Problem List Items Addressed This Visit     None               Reason for visit is   Chief Complaint   Patient presents with    Virtual Regular Visit        Encounter provider Ubaldo Anders RD    Provider located at [place] [address] [address] [place] [address]      Recent Visits  No visits were found meeting these conditions  Showing recent visits within past 7 days and meeting all other requirements  Future Appointments  No visits were found meeting these conditions  Showing future appointments within next 150 days and meeting all other requirements       The patient was identified by name and date of birth  Aldair [employer]  was informed that this is a telemedicine visit and that the visit is being conducted through [employer] and patient was informed that this is a secure, HIPAA-compliant platform  He agrees to proceed     My office door was closed  No one else was in the room  He acknowledged consent and understanding of privacy and security of the video platform  The patient has agreed to participate and understands they can discontinue the visit at any time  Patient is aware this is a billable service  Living Well with Diabetes Group Class #1    Berto Castellano  attended the Living Well with Diabetes Group Class #1  Topics Covered in class today include: What is diabetes; Types of Diabetes; How Diabetes is diagnosed; Management skills; the role of exercise in blood sugar managements, Home glucose monitoring, and target ranges      Angie Romo participated in group activities    The patient's history was reviewed and updated as appropriate: allergies, current medications  Lab Results   Component Value Date    HGBA1C 5 7 (H) 01/28/2022       Diabetes Education Record  Ladonna Acevedo was provided Living Well with Diabetes Class #1 book- sent via email      Patient response to instruction    Comprehensiongood  Motivationgood  Expected Compliancegood    Begin Time: 9:30am  End Time: 11:20am  Referring Provider: Santiago Garcia    Thank you for referring your patient to [facility], it was a pleasure working with them today  Please feel free to call with any questions or concerns  Inge Cueva, RD  [address] [facility] [address]  [facility]  Fidel Cortes  is a 63 y o  male see notes above   HPI     Past Medical History:   Diagnosis Date    Asthma due to seasonal allergies     Benign essential hypertension 08/16/2019    Bipolar 2 disorder (HCC) 02/20/2015    CPAP (continuous positive airway pressure) dependence     Diverticulosis     History of colon polyps     Hypertension     IFG (impaired fasting glucose)     Mixed hyperlipidemia 08/16/2019    Morbid obesity ([facility] [address] ) 04/13/2021    Obesity     RUKHSANA on CPAP 10/14/2020    Soledad's syndrome     x 2, Urethritis, Conjunctivtis, Joint Pains       Past Surgical History:   Procedure Laterality Date    WISDOM TOOTH EXTRACTION         Current Outpatient Medications   Medication Sig Dispense Refill    atorvastatin (LIPITOR) 20 mg tablet Take 1 tablet (20 mg total) by mouth daily at bedtime 90 tablet 2    Brexpiprazole (Rexulti) 2 MG tablet Take 1 tablet (2 mg total) by mouth daily at bedtime 90 tablet 2    cholecalciferol (VITAMIN D3) 1,000 units tablet Take 1,000 Units by mouth daily      DULoxetine (CYMBALTA) 60 mg delayed release capsule Take 1 capsule (60 mg total) by mouth daily Take with 30mg pill for a total of 90mg daily   90 capsule 2    glucose blood test strip Use 1 each daily as needed Use as instructed      lisinopril (ZESTRIL) 30 mg tablet Take 1 tablet (30 mg total) by mouth daily 90 tablet 2    metFORMIN (GLUCOPHAGE-XR) 500 mg 24 hr tablet Take 3 tablets (1,500 mg total) by mouth daily with breakfast 90 tablet 1     No current facility-administered medications for this visit  Allergies   Allergen Reactions    Lamotrigine Hives       Review of Systems    Video Exam    There were no vitals filed for this visit  Physical Exam     I spent 110 minutes with patient today in which greater than 50% of the time was spent in counseling/coordination of care regarding diabetes    VIRTUAL VISIT Asim Hart  verbally agrees to participate in [employer]  Pt is aware that [employer] could be limited without vital signs or the ability to perform a full hands-on physical exam  Aldair Hart  understands he or the provider may request at any time to terminate the video visit and request the patient to seek care or treatment in person

## 2022-04-11 ENCOUNTER — PATIENT MESSAGE (OUTPATIENT)
Dept: FAMILY MEDICINE CLINIC | Facility: CLINIC | Age: 62
End: 2022-04-11

## 2022-04-12 ENCOUNTER — PATIENT MESSAGE (OUTPATIENT)
Dept: FAMILY MEDICINE CLINIC | Facility: CLINIC | Age: 62
End: 2022-04-12

## 2022-04-12 DIAGNOSIS — F41.1 GENERALIZED ANXIETY DISORDER: ICD-10-CM

## 2022-04-12 RX ORDER — DULOXETIN HYDROCHLORIDE 60 MG/1
60 CAPSULE, DELAYED RELEASE ORAL DAILY
Qty: 90 CAPSULE | Refills: 2 | Status: SHIPPED | OUTPATIENT
Start: 2022-04-12

## 2022-04-12 RX ORDER — DULOXETIN HYDROCHLORIDE 30 MG/1
30 CAPSULE, DELAYED RELEASE ORAL DAILY
Qty: 90 CAPSULE | Refills: 2 | Status: SHIPPED | OUTPATIENT
Start: 2022-04-12

## 2022-04-12 RX ORDER — DULOXETIN HYDROCHLORIDE 60 MG/1
60 CAPSULE, DELAYED RELEASE ORAL DAILY
Qty: 90 CAPSULE | Refills: 2
Start: 2022-04-12 | End: 2022-04-12 | Stop reason: SDUPTHER

## 2022-04-12 NOTE — TELEPHONE ENCOUNTER
From: Meryl Hernandez  To: Walker Olivo,   Sent: 4/11/2022 12:20 PM EDT  Subject: Arik Goldsmith,    I have been very lethargic, lacking any motivation or stamina and I was wondering if it could be my medication (Cymbalta and Rexulti)  My wife is observing that I seem depressed and always tired  I have an appointment this Wednesday at 1:30pm with the Urologist and will discuss this with her to see if these symptoms could be related to my low testosterone levels  Please let me know if you have any follow-up questions for me   Thanks so much, Jose

## 2022-04-13 ENCOUNTER — OFFICE VISIT (OUTPATIENT)
Dept: UROLOGY | Facility: CLINIC | Age: 62
End: 2022-04-13
Payer: COMMERCIAL

## 2022-04-13 VITALS
SYSTOLIC BLOOD PRESSURE: 134 MMHG | HEART RATE: 96 BPM | HEIGHT: 67 IN | BODY MASS INDEX: 38.61 KG/M2 | WEIGHT: 246 LBS | DIASTOLIC BLOOD PRESSURE: 82 MMHG

## 2022-04-13 DIAGNOSIS — R53.83 FATIGUE, UNSPECIFIED TYPE: ICD-10-CM

## 2022-04-13 DIAGNOSIS — R68.82 LOW LIBIDO: ICD-10-CM

## 2022-04-13 DIAGNOSIS — R79.89 LOW TESTOSTERONE: ICD-10-CM

## 2022-04-13 PROCEDURE — 3008F BODY MASS INDEX DOCD: CPT | Performed by: PHYSICIAN ASSISTANT

## 2022-04-13 PROCEDURE — 1036F TOBACCO NON-USER: CPT | Performed by: PHYSICIAN ASSISTANT

## 2022-04-13 PROCEDURE — 99203 OFFICE O/P NEW LOW 30 MIN: CPT | Performed by: PHYSICIAN ASSISTANT

## 2022-04-13 RX ORDER — CALCIUM CARBONATE 300MG(750)
TABLET,CHEWABLE ORAL
COMMUNITY
Start: 2022-02-02

## 2022-04-13 RX ORDER — CYANOCOBALAMIN (VITAMIN B-12) 1000 MCG
TABLET ORAL
COMMUNITY
Start: 2022-02-02
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
## 2022-05-02 ENCOUNTER — APPOINTMENT (OUTPATIENT)
Dept: LAB | Facility: CLINIC | Age: 62
End: 2022-05-02
Payer: COMMERCIAL

## 2022-05-02 DIAGNOSIS — R79.89 LOW TESTOSTERONE: ICD-10-CM

## 2022-05-02 DIAGNOSIS — R68.82 LOW LIBIDO: ICD-10-CM

## 2022-05-02 LAB
ERYTHROCYTE [DISTWIDTH] IN BLOOD BY AUTOMATED COUNT: 14.3 % (ref 11.6–15.1)
FSH SERPL-ACNC: 5.1 MIU/ML (ref 0.7–10.8)
HCT VFR BLD AUTO: 46.9 % (ref 36.5–49.3)
HGB BLD-MCNC: 15.2 G/DL (ref 12–17)
LH SERPL-ACNC: 3.5 MIU/ML (ref 1.2–10.6)
MCH RBC QN AUTO: 31.5 PG (ref 26.8–34.3)
MCHC RBC AUTO-ENTMCNC: 32.4 G/DL (ref 31.4–37.4)
MCV RBC AUTO: 97 FL (ref 82–98)
PLATELET # BLD AUTO: 245 THOUSANDS/UL (ref 149–390)
PMV BLD AUTO: 9.7 FL (ref 8.9–12.7)
PROLACTIN SERPL-MCNC: 6.9 NG/ML (ref 2.5–17.4)
RBC # BLD AUTO: 4.82 MILLION/UL (ref 3.88–5.62)
TESTOST SERPL-MCNC: 175 NG/DL (ref 95–948)
WBC # BLD AUTO: 5.66 THOUSAND/UL (ref 4.31–10.16)

## 2022-05-02 PROCEDURE — 84403 ASSAY OF TOTAL TESTOSTERONE: CPT

## 2022-05-02 PROCEDURE — 83002 ASSAY OF GONADOTROPIN (LH): CPT

## 2022-05-02 PROCEDURE — 85027 COMPLETE CBC AUTOMATED: CPT

## 2022-05-02 PROCEDURE — 84146 ASSAY OF PROLACTIN: CPT

## 2022-05-02 PROCEDURE — 83001 ASSAY OF GONADOTROPIN (FSH): CPT

## 2022-05-02 PROCEDURE — 36415 COLL VENOUS BLD VENIPUNCTURE: CPT

## 2022-05-18 ENCOUNTER — OFFICE VISIT (OUTPATIENT)
Dept: UROLOGY | Facility: CLINIC | Age: 62
End: 2022-05-18
Payer: COMMERCIAL

## 2022-05-18 VITALS
BODY MASS INDEX: 38.96 KG/M2 | HEIGHT: 67 IN | SYSTOLIC BLOOD PRESSURE: 130 MMHG | DIASTOLIC BLOOD PRESSURE: 70 MMHG | WEIGHT: 248.2 LBS | OXYGEN SATURATION: 95 % | RESPIRATION RATE: 16 BRPM | HEART RATE: 96 BPM

## 2022-05-18 DIAGNOSIS — R79.89 LOW TESTOSTERONE: Primary | ICD-10-CM

## 2022-05-18 PROCEDURE — 1036F TOBACCO NON-USER: CPT | Performed by: PHYSICIAN ASSISTANT

## 2022-05-18 PROCEDURE — 99213 OFFICE O/P EST LOW 20 MIN: CPT | Performed by: PHYSICIAN ASSISTANT

## 2022-05-18 PROCEDURE — 3008F BODY MASS INDEX DOCD: CPT | Performed by: PHYSICIAN ASSISTANT

## 2022-05-18 RX ORDER — TESTOSTERONE 40.5 MG/2.5G
40.5 GEL TOPICAL DAILY
Qty: 30 G | Refills: 3 | Status: SHIPPED | OUTPATIENT
Start: 2022-05-18 | End: 2022-05-26 | Stop reason: DRUGHIGH
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
## 2022-05-20 ENCOUNTER — RA CDI HCC (OUTPATIENT)
Dept: OTHER | Facility: HOSPITAL | Age: 62
End: 2022-05-20

## 2022-05-20 DIAGNOSIS — E29.1 TESTICULAR HYPOFUNCTION: ICD-10-CM

## 2022-05-20 DIAGNOSIS — R79.89 LOW TESTOSTERONE: Primary | ICD-10-CM

## 2022-05-20 NOTE — TELEPHONE ENCOUNTER
Patient calling to check the status of medication auth for testosterone 40 5 mg      Patient can be reached at  [phone]

## 2022-05-20 NOTE — PROGRESS NOTES
[facility]  coding opportunities       Chart reviewed, no opportunity found: CHART REVIEWED, NO OPPORTUNITY FOUND        Patients Insurance     Medicare Insurance: [facility] Advantage

## 2022-05-23 ENCOUNTER — PATIENT MESSAGE (OUTPATIENT)
Dept: UROLOGY | Facility: CLINIC | Age: 62
End: 2022-05-23

## 2022-05-24 NOTE — TELEPHONE ENCOUNTER
Prior Authorization for Testosterone 40 5mg/2 5g (1 62%) gel packets was requested and initiated via [employer] - Key: [id number]  Response questions answered and submitted for consideration  Final determination is expected within 24-72 hours

## 2022-05-24 NOTE — TELEPHONE ENCOUNTER
Norma Bedolla   to Abrahan Nicholas PA-C  5/23/22 12:09 PM  Hi Dr Ray Irwin,     The insurance rejected the prescription for the Testosterone   Is there anything your office can do to get it authorized with the [employer]?     Thanks in Shmuel Tapia  [phone]

## 2022-05-25 NOTE — TELEPHONE ENCOUNTER
Final determination received and Testosterone 40 5mg/2 5g (1 62%) gel packets was DENIED as a Non-Preferred Drug  Preferred formulary alternatives are:  GENERIC Testosterone (1%); methyltestosterone capsules, Testosterone Cypionate 100 or 200mg/mL or Testosterone Enanthate 200mg/mL  Left message regarding same on patient's cell phone voice mail requesting a return call to discuss further  Message forwarded back to the provider for further consideration

## 2022-05-26 NOTE — TELEPHONE ENCOUNTER
Provider wants TOPICAL agent for this patient  Too may peaks and valleys associated with injection therapy  I spoke with the patient and he is aware of drug change and need for new drug prior authorization  I will call him when I know more  CORRECTED/NEW script created for:  Testosterone (1%) 50mg/5g gel packet - Apply 1 packet topically once daily - Dispense: 30 packets Refills: 5  Script for the requested medication was queued and forwarded to Sandie Gordon PA-C for approval     Prior Authorization for Testosterone (1%) 50mg/5g gel packets was requested and initiated via [employer]  com -  (Key: [id number])  Response questions answered and submitted for consideration  Final determination is expected within 24-72 hours

## 2022-05-27 RX ORDER — TESTOSTERONE GEL, 1% 10 MG/G
50 GEL TRANSDERMAL DAILY
Qty: 30 PACKET | Refills: 5 | Status: SHIPPED | OUTPATIENT
Start: 2022-05-27 | End: 2022-07-08 | Stop reason: SDUPTHER

## 2022-05-27 NOTE — TELEPHONE ENCOUNTER
Prior Authorization for Testosterone (1%) 50mg/5g gel packets was APPROVED and valid from 5/27/22 until 5/27/23 (30 packets for 30 days)  Both the pharmacy and patient made aware of same

## 2022-06-01 ENCOUNTER — LAB (OUTPATIENT)
Dept: LAB | Facility: CLINIC | Age: 62
End: 2022-06-01
Payer: COMMERCIAL

## 2022-06-01 ENCOUNTER — OFFICE VISIT (OUTPATIENT)
Dept: FAMILY MEDICINE CLINIC | Facility: CLINIC | Age: 62
End: 2022-06-01
Payer: COMMERCIAL

## 2022-06-01 VITALS
BODY MASS INDEX: 38.92 KG/M2 | HEART RATE: 97 BPM | TEMPERATURE: 97.5 F | SYSTOLIC BLOOD PRESSURE: 128 MMHG | OXYGEN SATURATION: 95 % | WEIGHT: 248 LBS | DIASTOLIC BLOOD PRESSURE: 78 MMHG | RESPIRATION RATE: 16 BRPM | HEIGHT: 67 IN

## 2022-06-01 DIAGNOSIS — Z99.89 OSA ON CPAP: ICD-10-CM

## 2022-06-01 DIAGNOSIS — R73.01 IFG (IMPAIRED FASTING GLUCOSE): ICD-10-CM

## 2022-06-01 DIAGNOSIS — I10 BENIGN ESSENTIAL HYPERTENSION: ICD-10-CM

## 2022-06-01 DIAGNOSIS — J45.909 ASTHMA DUE TO SEASONAL ALLERGIES: ICD-10-CM

## 2022-06-01 DIAGNOSIS — E66.01 MORBID OBESITY (HCC): ICD-10-CM

## 2022-06-01 DIAGNOSIS — F31.81 BIPOLAR 2 DISORDER (HCC): ICD-10-CM

## 2022-06-01 DIAGNOSIS — F41.1 GENERALIZED ANXIETY DISORDER: ICD-10-CM

## 2022-06-01 DIAGNOSIS — R73.01 IFG (IMPAIRED FASTING GLUCOSE): Primary | ICD-10-CM

## 2022-06-01 DIAGNOSIS — E66.01 CLASS 2 SEVERE OBESITY WITH SERIOUS COMORBIDITY AND BODY MASS INDEX (BMI) OF 38.0 TO 38.9 IN ADULT, UNSPECIFIED OBESITY TYPE (HCC): ICD-10-CM

## 2022-06-01 DIAGNOSIS — E78.2 MIXED HYPERLIPIDEMIA: ICD-10-CM

## 2022-06-01 DIAGNOSIS — E66.01 MORBID OBESITY WITH BMI OF 40.0-44.9, ADULT (HCC): ICD-10-CM

## 2022-06-01 DIAGNOSIS — E78.1 HYPERTRIGLYCERIDEMIA: ICD-10-CM

## 2022-06-01 DIAGNOSIS — Z86.010 HISTORY OF COLON POLYPS: ICD-10-CM

## 2022-06-01 DIAGNOSIS — G47.33 OSA ON CPAP: ICD-10-CM

## 2022-06-01 DIAGNOSIS — E29.1 HYPOGONADISM IN MALE: ICD-10-CM

## 2022-06-01 LAB
ALBUMIN SERPL BCP-MCNC: 4.2 G/DL (ref 3.5–5)
ALP SERPL-CCNC: 48 U/L (ref 34–104)
ALT SERPL W P-5'-P-CCNC: 27 U/L (ref 7–52)
ANION GAP SERPL CALCULATED.3IONS-SCNC: 8 MMOL/L (ref 4–13)
AST SERPL W P-5'-P-CCNC: 16 U/L (ref 13–39)
BILIRUB SERPL-MCNC: 0.68 MG/DL (ref 0.2–1)
BUN SERPL-MCNC: 19 MG/DL (ref 5–25)
CALCIUM SERPL-MCNC: 9.2 MG/DL (ref 8.4–10.2)
CHLORIDE SERPL-SCNC: 107 MMOL/L (ref 96–108)
CHOLEST SERPL-MCNC: 187 MG/DL
CO2 SERPL-SCNC: 25 MMOL/L (ref 21–32)
CREAT SERPL-MCNC: 1.01 MG/DL (ref 0.6–1.3)
EST. AVERAGE GLUCOSE BLD GHB EST-MCNC: 108 MG/DL
GFR SERPL CREATININE-BSD FRML MDRD: 80 ML/MIN/1.73SQ M
GLUCOSE P FAST SERPL-MCNC: 86 MG/DL (ref 65–99)
HBA1C MFR BLD: 5.4 %
HDLC SERPL-MCNC: 35 MG/DL
LDLC SERPL CALC-MCNC: 74 MG/DL (ref 0–100)
LDLC SERPL DIRECT ASSAY-MCNC: 89 MG/DL (ref 0–100)
NONHDLC SERPL-MCNC: 152 MG/DL
POTASSIUM SERPL-SCNC: 4.3 MMOL/L (ref 3.5–5.3)
PROT SERPL-MCNC: 6.7 G/DL (ref 6.4–8.4)
SODIUM SERPL-SCNC: 140 MMOL/L (ref 135–147)
TRIGL SERPL-MCNC: 391 MG/DL
TSH SERPL DL<=0.05 MIU/L-ACNC: 3.75 UIU/ML (ref 0.45–4.5)

## 2022-06-01 PROCEDURE — 3008F BODY MASS INDEX DOCD: CPT | Performed by: FAMILY MEDICINE

## 2022-06-01 PROCEDURE — 83036 HEMOGLOBIN GLYCOSYLATED A1C: CPT

## 2022-06-01 PROCEDURE — 3725F SCREEN DEPRESSION PERFORMED: CPT | Performed by: FAMILY MEDICINE

## 2022-06-01 PROCEDURE — 83721 ASSAY OF BLOOD LIPOPROTEIN: CPT

## 2022-06-01 PROCEDURE — 1036F TOBACCO NON-USER: CPT | Performed by: FAMILY MEDICINE

## 2022-06-01 PROCEDURE — 80053 COMPREHEN METABOLIC PANEL: CPT

## 2022-06-01 PROCEDURE — 84443 ASSAY THYROID STIM HORMONE: CPT

## 2022-06-01 PROCEDURE — 36415 COLL VENOUS BLD VENIPUNCTURE: CPT

## 2022-06-01 PROCEDURE — 80061 LIPID PANEL: CPT

## 2022-06-01 PROCEDURE — 99214 OFFICE O/P EST MOD 30 MIN: CPT | Performed by: FAMILY MEDICINE

## 2022-06-01 NOTE — ASSESSMENT & PLAN NOTE
Stable  Continue Cymbalta 60mg daily, Rexulti 2 mg nightly  Anticipate mood improvement s/p Androgel initiaiton

## 2022-06-01 NOTE — ASSESSMENT & PLAN NOTE
Improved  Insurance would not cover Metformin XR 500mg 3 pills QD or pre-DM education  Recommend lifestyle modifications  To consider short-acting Metformin in future PRN?

## 2022-06-01 NOTE — PROGRESS NOTES
Assessment/Plan:  Problem List Items Addressed This Visit        Endocrine    IFG (impaired fasting glucose) - Primary     Improved  Insurance would not cover Metformin XR 500mg 3 pills QD or pre-DM education  Recommend lifestyle modifications  To consider short-acting Metformin in future PRN? Relevant Orders    Hemoglobin A1C    Hypogonadism in male     Management per Uro  Symptomatic  S/p recent Androgel initiation  Respiratory    RUKHSANA on CPAP     Continue CPAP  Management per Sleep Medicine  Asthma due to seasonal allergies     Stable  Management per asthma/allergy-overdue for appointment  Cardiovascular and Mediastinum    Benign essential hypertension     Stable  Check blood pressure outside of office  Recommend lifestyle modifications  Relevant Orders    Comprehensive metabolic panel       Other    Mixed hyperlipidemia     Stable  Continue Lipitor 20 mg nightly  Recommend lifestyle modifications  Relevant Orders    Lipid panel    TSH, 3rd generation with Free T4 reflex    LDL cholesterol, direct    Generalized anxiety disorder     Stable  Continue Cymbalta 60mg daily, Rexulti 2 mg nightly  Anticipate mood improvement s/p Androgel initiaiton  Relevant Orders    TSH, 3rd generation with Free T4 reflex    Bipolar 2 disorder ([facility] [address] )     Stable  Continue Cymbalta 60mg daily, Rexulti 2 mg nightly  Anticipate mood improvement s/p Androgel initiaiton  Relevant Orders    TSH, 3rd generation with Free T4 reflex    Class 2 severe obesity with serious comorbidity and body mass index (BMI) of 38 0 to 38 9 in adult (HCC)     Stable  Recommend lifestyle modifications  Relevant Orders    TSH, 3rd generation with Free T4 reflex    Hypertriglyceridemia     Uncontrolled  Patient wishes to start OTC fish oil 4 g daily  To consider starting fenofibrate 160 mg daily or Lovaza in the future p r n ?   Recommend lifestyle modifications  History of colon polyps     Colonoscopy is up to date  Return in about 4 months (around 10/1/2022) for 4mo - IFG, HTN, HL, Anx, BPD, Low T, RUKHSANA, M Obesity, Labs  Future Appointments   Date Time Provider Oh Clement   8/18/2022  1:30 PM Jana [id number] Winters [facility]   10/6/2022  1:20 PM Nick Contreras DO [facility]        Subjective:     Jaclyn Amador is a 63 y o  male who presents today for a follow-up on his chronic medical conditions  HPI:  Chief Complaint   Patient presents with    Follow-up     4M F/U    Labs Only     6/1/22    Medication Refill     N/A    HM     Covid booster #4 forgot card     -- Above per clinical staff and reviewed  --      HPI      Today:      Return in about 4 months (around 6/1/2022) for 4mo - IFG, HTN, HL, Anx, BPD, Low T, RUKHSANA, M Obesity, Labs  4mo OV     Retired late 9/21           Morbid Obesity - Watching diet - avoiding salt in food, but eating increased portions of healthy good   No Regular exercise - Previously Swimming for 30 minutes, 4 days per week; previously Walking with wife 45 minutes, 4 times per week; bike riding 35 minutes, 2 times per week  IFG - Patient never started Metformin XR 500mg 3 pills QD x 4 months as it was not covered by his insurance  S/p Pre-DM education - last attended 3/9/22, insurance would no longer cover           HTN - On Lisinopril 30mg QD   No higher dose or other HTN Rx previously   No BP check outside of office  Jazmyn Mcghee has an arm BP cuff at home   Stable Echo 10/10/19        Hyperlipidemia - On Lipitor 20mg QHS   No higher doses   Unsure of other statin name that he had tried previously - ineffective   s/p clean cardiac cath 10/4/19        RUKHSANA on CPAP - Uses CPAP regularly   Management per Sleep Specialist on [address] - Dr Alison Estrada  Last seen 12/21  Next appt 12/22    Last sleep study 2017?, no weight changes in the interim        Bipolar Disorder - Feels mood is improved  He previously requested to decrease his Cymbalta dose from 90mg QD to 60mg QD due to decreased stress in group home, but noticed worsening mood and Cymbalta 60mg QD was resumed 22  Feels motivation is down   On Cymbalta 90mg QD, Rexulti 2mg QHS - has not been on higher doses   D/C Lamictal due to hives   Good social supports   Last counseling 2018 - helpful   No SI/HI/AH/VH        Anxiety - On increased Cymbalta 90mg QD   He no longer feels drained by his anxiety due to increased work stressors, and is able to cope better   Feels 90% improved   Previously on Zoloft - ineffective          PHQ-2/9 Depression Screening    Little interest or pleasure in doing things: 0 - not at all  Feeling down, depressed, or hopeless: 0 - not at all  Trouble falling or staying asleep, or sleeping too much: 1 - several days  Feeling tired or having little energy: 1 - several days  Poor appetite or overeatin - not at all  Feeling bad about yourself - or that you are a failure or have let yourself or your family down: 0 - not at all  Trouble concentrating on things, such as reading the newspaper or watching television: 0 - not at all  Moving or speaking so slowly that other people could have noticed  Or the opposite - being so fidgety or restless that you have been moving around a lot more than usual: 0 - not at all  Thoughts that you would be better off dead, or of hurting yourself in some way: 0 - not at all  PHQ-2 Score: 0  PHQ-2 Interpretation: Negative depression screen  PHQ-9 Score: 2   PHQ-9 Interpretation: No or Minimal depression          VENANCIO-7 Flowsheet Screening    Flowsheet Row Most Recent Value   Over the last 2 weeks, how often have you been bothered by any of the following problems?     Feeling nervous, anxious, or on edge 0   Not being able to stop or control worrying 0   Worrying too much about different things 0   Trouble relaxing 0   Being so restless that it is hard to sit still 0   Becoming easily annoyed or irritable 0   Feeling afraid as if something awful might happen 0   VENANCIO-7 Total Score 0        MDQ:  2, Asynchronous, No Problem        Asthma - Triggered by allergies   Uses Albuterol HFA PRN and Breo 100-25 1 puff QD PRN   Last use 3/20   No ICS previously  Yenni Severe rafa 20019 per Asthma / Allergist   Overdue for appointment   ACT 25 on 8/31/21       Low Testosterone - x 2 6/29/20 and 11/25/20   Management per Uro Ms Gerry Mancia, PA-C  Next appt 8/22  On Androgel since 5/30/22  Low libido, + ED          Soledad's Syndrome - x 2   S/p Antibiotics   Never followed c Rheum        H/O Colon Polyps - Colonoscopy per  05[address]jo - next colonoscopy 2023        Reviewed:  Labs 6/1/22, Uro 5/18/22                 The following portions of the patient's history were reviewed and updated as appropriate: allergies, current medications, past family history, past medical history, past social history, past surgical history and problem list       Review of Systems   Constitutional: Positive for fatigue  Negative for appetite change, chills, diaphoresis and fever  Respiratory: Negative for chest tightness and shortness of breath  Cardiovascular: Negative for chest pain  Gastrointestinal: Negative for abdominal pain, blood in stool, diarrhea, nausea and vomiting  Genitourinary: Negative for dysuria  Current Outpatient Medications   Medication Sig Dispense Refill    atorvastatin (LIPITOR) 20 mg tablet Take 1 tablet (20 mg total) by mouth daily at bedtime 90 tablet 2    Brexpiprazole (Rexulti) 2 MG tablet Take 1 tablet (2 mg total) by mouth daily at bedtime 90 tablet 2    cholecalciferol (VITAMIN D3) 1,000 units tablet Take 1,000 Units by mouth daily      Cyanocobalamin (B-12) 1000 MCG TABS       DULoxetine (CYMBALTA) 30 mg delayed release capsule Take 1 capsule (30 mg total) by mouth daily Take with 60mg pill for a total of 90mg daily   80 capsule 2    DULoxetine (CYMBALTA) 60 mg delayed release capsule Take 1 capsule (60 mg total) by mouth daily Take with 30mg pill for a total of 90mg daily  90 capsule 2    lisinopril (ZESTRIL) 30 mg tablet Take 1 tablet (30 mg total) by mouth daily 90 tablet 2    Magnesium 400 MG TABS       testosterone (ANDROGEL) 1% Apply 1 packet (50 mg total) topically daily 30 packet 5     No current facility-administered medications for this visit  Objective:  /78   Pulse 97   Temp 97 5 °F (36 4 °C)   Resp 16   Ht 5' 7" (1 702 m)   Wt 112 kg (248 lb)   SpO2 95%   BMI 38 84 kg/m²    Wt Readings from Last 3 Encounters:   06/01/22 112 kg (248 lb)   05/18/22 113 kg (248 lb 3 2 oz)   04/13/22 112 kg (246 lb)      BP Readings from Last 3 Encounters:   06/01/22 128/78   05/18/22 130/70   04/13/22 134/82          Physical Exam  Vitals and nursing note reviewed  Constitutional:       Appearance: Normal appearance  He is well-developed  He is obese  HENT:      Head: Normocephalic and atraumatic  Eyes:      Conjunctiva/sclera: Conjunctivae normal    Neck:      Thyroid: No thyromegaly  Cardiovascular:      Rate and Rhythm: Normal rate and regular rhythm  Pulses: Normal pulses  Heart sounds: Normal heart sounds  Pulmonary:      Effort: Pulmonary effort is normal       Breath sounds: Normal breath sounds  Abdominal:      General: Bowel sounds are normal  There is no distension  Palpations: Abdomen is soft  There is no mass  Tenderness: There is no abdominal tenderness  There is no guarding or rebound  Musculoskeletal:         General: No swelling or tenderness  Cervical back: Neck supple  Right lower leg: No edema  Left lower leg: No edema  Neurological:      General: No focal deficit present  Mental Status: He is alert and oriented to person, place, and time     Psychiatric:         Mood and Affect: Mood normal          Behavior: Behavior normal          Thought Content: Thought content normal          Judgment: Judgment normal          Lab Results:      Lab Results   Component Value Date    WBC 5 66 05/02/2022    HGB 15 2 05/02/2022    HCT 46 9 05/02/2022     05/02/2022    TRIG 391 (H) 06/01/2022    HDL 35 (L) 06/01/2022    LDLDIRECT 89 06/01/2022    ALT 27 06/01/2022    AST 16 06/01/2022    K 4 3 06/01/2022     06/01/2022    CREATININE 1 01 06/01/2022    BUN 19 06/01/2022    CO2 25 06/01/2022    PSA 1 2 08/28/2021    INR 1 0 10/01/2019    GLUF 86 06/01/2022    HGBA1C 5 4 06/01/2022     No results found for: URICACID  Invalid input(s): BASENAME Vitamin D    No results found       POCT Labs

## 2022-06-01 NOTE — RESULT ENCOUNTER NOTE
Unstable labs - will review with patient at upcoming appointment  Hypertriglyceridemia - To consider Fenofibrate 160mg QD? Recommend lifestyle modifications

## 2022-07-05 ENCOUNTER — PATIENT MESSAGE (OUTPATIENT)
Dept: UROLOGY | Facility: CLINIC | Age: 62
End: 2022-07-05

## 2022-07-05 DIAGNOSIS — E29.1 TESTICULAR HYPOFUNCTION: ICD-10-CM

## 2022-07-05 DIAGNOSIS — R79.89 LOW TESTOSTERONE: ICD-10-CM

## 2022-07-07 DIAGNOSIS — R79.89 LOW TESTOSTERONE: ICD-10-CM

## 2022-07-07 DIAGNOSIS — E29.1 TESTICULAR HYPOFUNCTION: ICD-10-CM

## 2022-07-08 RX ORDER — TESTOSTERONE GEL, 1% 10 MG/G
50 GEL TRANSDERMAL DAILY
Qty: 90 PACKET | Refills: 1 | Status: SHIPPED | OUTPATIENT
Start: 2022-07-08

## 2022-07-08 NOTE — PATIENT COMMUNICATION
Script can just be reissued to the pharmacy the patient is requesting  [facility] provided more cost-effective pricing for patient's who meet their income limits  I've used their service before and happy the patient was able to connect      Script requeued and forwarded to the Advanced Practitioner covering the [facility] location for approval

## 2022-08-16 ENCOUNTER — TELEPHONE (OUTPATIENT)
Dept: UROLOGY | Facility: CLINIC | Age: 62
End: 2022-08-16

## 2022-08-16 NOTE — TELEPHONE ENCOUNTER
Called and spoke to Pt about labs to get done prior to visit  Informed Pt to get labs done in the morning on 8/17/22 and fasting is preferred for lab work  Pt verbalized understanding and will go tomorrow morning to get it done

## 2022-08-17 ENCOUNTER — APPOINTMENT (OUTPATIENT)
Dept: LAB | Facility: CLINIC | Age: 62
End: 2022-08-17
Payer: COMMERCIAL

## 2022-08-17 DIAGNOSIS — R79.89 LOW TESTOSTERONE: ICD-10-CM

## 2022-08-17 LAB
ERYTHROCYTE [DISTWIDTH] IN BLOOD BY AUTOMATED COUNT: 15 % (ref 11.6–15.1)
HCT VFR BLD AUTO: 47.9 % (ref 36.5–49.3)
HGB BLD-MCNC: 15.7 G/DL (ref 12–17)
MCH RBC QN AUTO: 31.7 PG (ref 26.8–34.3)
MCHC RBC AUTO-ENTMCNC: 32.8 G/DL (ref 31.4–37.4)
MCV RBC AUTO: 97 FL (ref 82–98)
PLATELET # BLD AUTO: 253 THOUSANDS/UL (ref 149–390)
PMV BLD AUTO: 9.7 FL (ref 8.9–12.7)
PSA SERPL-MCNC: 1.3 NG/ML (ref 0–4)
RBC # BLD AUTO: 4.96 MILLION/UL (ref 3.88–5.62)
TESTOST SERPL-MCNC: 695 NG/DL (ref 95–948)
WBC # BLD AUTO: 8.14 THOUSAND/UL (ref 4.31–10.16)

## 2022-08-17 PROCEDURE — G0103 PSA SCREENING: HCPCS

## 2022-08-17 PROCEDURE — 36415 COLL VENOUS BLD VENIPUNCTURE: CPT

## 2022-08-17 PROCEDURE — 84403 ASSAY OF TOTAL TESTOSTERONE: CPT

## 2022-08-17 PROCEDURE — 85027 COMPLETE CBC AUTOMATED: CPT

## 2022-08-18 ENCOUNTER — OFFICE VISIT (OUTPATIENT)
Dept: UROLOGY | Facility: CLINIC | Age: 62
End: 2022-08-18
Payer: COMMERCIAL

## 2022-08-18 VITALS
WEIGHT: 251 LBS | HEIGHT: 67 IN | SYSTOLIC BLOOD PRESSURE: 138 MMHG | HEART RATE: 81 BPM | DIASTOLIC BLOOD PRESSURE: 88 MMHG | BODY MASS INDEX: 39.39 KG/M2 | OXYGEN SATURATION: 98 %

## 2022-08-18 DIAGNOSIS — E29.1 TESTICULAR HYPOFUNCTION: ICD-10-CM

## 2022-08-18 DIAGNOSIS — R79.89 LOW TESTOSTERONE: ICD-10-CM

## 2022-08-18 PROCEDURE — 99213 OFFICE O/P EST LOW 20 MIN: CPT | Performed by: PHYSICIAN ASSISTANT
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
## 2022-09-07 ENCOUNTER — OFFICE VISIT (OUTPATIENT)
Dept: FAMILY MEDICINE CLINIC | Facility: CLINIC | Age: 62
End: 2022-09-07
Payer: COMMERCIAL

## 2022-09-07 VITALS
TEMPERATURE: 97.1 F | BODY MASS INDEX: 39.68 KG/M2 | RESPIRATION RATE: 18 BRPM | OXYGEN SATURATION: 97 % | SYSTOLIC BLOOD PRESSURE: 128 MMHG | WEIGHT: 252.8 LBS | HEIGHT: 67 IN | DIASTOLIC BLOOD PRESSURE: 76 MMHG | HEART RATE: 89 BPM

## 2022-09-07 DIAGNOSIS — E66.01 CLASS 2 SEVERE OBESITY WITH SERIOUS COMORBIDITY AND BODY MASS INDEX (BMI) OF 39.0 TO 39.9 IN ADULT, UNSPECIFIED OBESITY TYPE (HCC): ICD-10-CM

## 2022-09-07 DIAGNOSIS — G47.33 OSA ON CPAP: ICD-10-CM

## 2022-09-07 DIAGNOSIS — E29.1 HYPOGONADISM IN MALE: ICD-10-CM

## 2022-09-07 DIAGNOSIS — E66.01 CLASS 2 SEVERE OBESITY WITH SERIOUS COMORBIDITY AND BODY MASS INDEX (BMI) OF 38.0 TO 38.9 IN ADULT, UNSPECIFIED OBESITY TYPE (HCC): ICD-10-CM

## 2022-09-07 DIAGNOSIS — Z99.89 OSA ON CPAP: ICD-10-CM

## 2022-09-07 DIAGNOSIS — F41.1 GENERALIZED ANXIETY DISORDER: ICD-10-CM

## 2022-09-07 DIAGNOSIS — F31.81 BIPOLAR 2 DISORDER (HCC): Primary | ICD-10-CM

## 2022-09-07 DIAGNOSIS — I10 BENIGN ESSENTIAL HYPERTENSION: ICD-10-CM

## 2022-09-07 PROCEDURE — 99214 OFFICE O/P EST MOD 30 MIN: CPT | Performed by: FAMILY MEDICINE

## 2022-09-07 PROCEDURE — 3725F SCREEN DEPRESSION PERFORMED: CPT | Performed by: FAMILY MEDICINE

## 2022-09-07 RX ORDER — QUETIAPINE FUMARATE 50 MG/1
TABLET, FILM COATED ORAL
Qty: 30 TABLET | Refills: 1 | Status: SHIPPED | OUTPATIENT
Start: 2022-09-07

## 2022-09-07 NOTE — PATIENT INSTRUCTIONS
Refer to the back of insurance card for counseling options  Apps and Websites for Counseling, Anxiety/Depression, Chronic Pain, Lifestyle Change, Problem-solving, Self-Esteem, Anger Management, Coping with Uncertainty    (Prices current as of 9/5/19)    Mood Kit - Available in [employer] for $4 99  Supports a person's success in specific situations, includes thought , mood tracker, and journal   Can be accessed in an unstructured way and used as an unguided self-help krysten  2   Moodnotes - Available in [employer] Store for $4 99  Focuses on healthier thinking habits and identifying "traps" in thinking  Tracks mood over a period of time and identifies factors that influence mood  3   MoodMission - Available in [facility] for free  Includes five "missions" to promote confidence in handling stressors and coping in specific situations  The krysten personalizes its style and techniques based on when the user engages it most frequently  In-krysten rewards are used to motivate, increase fun and self-confidence  Helpful for patients who need improvement in mood or a decrease in anxiety and depression symptoms  4    What's Up - Available in [employer] for free  Recognizes negative thinking patterns and methods to overcome them  Uses helpful metaphors, a catastrophe scale, grounding techniques, and breathing exercises  Has the ability to sync data across multiple devices and protect this information with a unique pass code  Has the capability to be active in forums where people can discuss similar feelings and strategies that have been helpful  5   Moodpath - Available in [employer] for free  Uses daily screenings to create a better understanding of thoughts, feelings, and emotions  When needed, it provides a discussion guide to talking with a medical professional based on the responses to daily screenings    Includes over 150 psychological exercises and videos to promote and strengthen overall mental health  6   MindShift - Available in [employer] for free  Helpful for youth and young adults in coping with anxiety  Creates an individualized toolbox to help users deal with test anxiety, perfectionism, social anxiety, worry, panic, and conflict  Includes directions on how to construct belief experiments to trial common beliefs that feed anxiety, guided relaxation, as well as tools and tips to help establish and accomplish goals  Differentiates between helpful and unhelpful anxiety, and explains how to overcome fears by gradually facing them in manageable steps  7   CBT-I  - Available in [employer] for free  For insomnia  Educates about sleep, developing positive sleep routines, and improved sleep environment  Encourages user to change behaviors which will provide confidence for better sleep on a regular basis  8   Bubbly uk - Free website  Provides self-help and therapy resources that encourages change to combat negative and destructive thought patterns  Includes access to numerous handouts on a variety of symptoms related to anxiety, depression, low self-esteem, panic attacks, social disorders, and more  The solution section has interventions that can be printed and saved for future use  9   Woebot - Available in [employer] for free  Recommended for age 16+  Friendly self-care  that helps you think through situations with step-by-step guidance using counseling tools, learn about yourself with intelligent mood tracking, and master skills to reduce stress and live happier through 100+ evidence-based stories from clinicians  Chat as often or as little as you'd like, whenever you'd like to  10   Laura:  JARVIS  CBT DBT Chatbot - Available in Apple appsstore and Google Play for free, has in-nathaniel purchases  Recommended for 12+  Psychologist support at $30/month, 50% off to start  Chitra Montelongo / Eugenio Brant is free  Uses techniques of CBT, DBT, yoga, and meditation to support your needs regarding stress, anxiety, sleep, loss, and a full range of other mental health and wellness issues  11   Curable Pain Relief - Available in [employer] K[employer]en store and [employer] Play for free, has in-krysten purchases  Teaches about the chronic pain cycle and how to reverse it, while retraining your brain and nervous system for long-term results  Smart  Antonella guides user through updates in pain science to identify, target, eliminate the factors that are keeping user "stuck" in the pain cycle  12   Talks[employer] Online Therapy - Available in [employer] K[employer]en store and Google Play for a fee  Subscription service, starting at $59/week (billed monthly)  All plans include unlimited messaging, and add live video sessions if desired  Unlimited messaging therapy for teens ages 15-14 and special services for couples therapy  You can change therapists or stop subscription renewal at any time  Recommended for 13+  User is matched with a licensed therapist in your state and communicate on your device by text, audio, and video from anywhere, at any time  User will hear back at least once a day, 5 days per week  Counseling services:    [facility]   [address] [facility], 939 [facility], [facility], [address] [place] [address] [place] [address] (they have equine therapy too)  [address] [place] [address],  [facility], [address] [facility]  [address], [address],  [place], [address]  [address] [address]   [address] [facility], [facility] [address]   [address]  200 [facility], [address]  [place]rison, Brianna Naqvi    [facility]   [address], [place], [address]     [phone]     [address]  41 [facility], [address] [address]  [email or web address]    ANJELICA Blanca, LUCIO  (patients age 11-adult)   [address], [address] [facility], [address]   1 [facility] 209 [facility], [address]   [facility], [address] [facility] 20, [facility], [facility],[address], [facility] [place], [address] [address]  [address] [facility] (specializing in addiction)  [facility], [address]  [address] [facility] [address]  291 [facility], [facility] [place], 6[address] [facility]   Alic[facility]ow[facility]ile [facility], [address] [facility] [address] , [facility], [facility] [place], [address] [facility]   203 [facility] [address], 7890 [facility], [address]  100 [facility], [address], [facility] [place], [address]  [phone]     Travis Silva PsyD  [address] [facility], [place], [facility] [address]   [phone]    Briseyda Stroud, [address] [address] [phone]      Hotlines:   Please program these numbers into your phone in case you or someone you know needs them  All services are free  65  People who call, text, or chat with 988 will be directly connected to the [address]  The existing Lifeline phone number ([phone]) will remain available  Callers can also connect with the [facility] Financial or assistance in [place]  988 can be used by anyone, any time, at no cost  Trained crisis response professionals can support individuals considering suicide, self-harm, or any behavioral, substance abuse or misuse or mental health need for themselves or people looking for help for a loved one experiencing a mental health crisis  Lifeline services are available 24 hours a day, seven days a week at no cost to the caller    Crisis Text Line: text 714348     You are connected to a Crisis Counselor to bring a hot moment to a cool calm through active listening and collaborative problem solving  WarmLine:  [phone] or [phone]   They provide a supportive listening ear if you need it  They also can also provide information about mental health concerns and services  Crisis Line:  [facility] [phone],  [facility] [phone], [address] [place] [address] and [place]: [phone]   24/7 crisis counseling, on-site counseling and walk-in counseling services available  National Suicide Prevention Lifeline:  5-145.693.1690  En [address] [facility] [phone]   This is free, confidential, and available 24/7  [place]: [phone]   For those facing or having survived abuse 24 hour confidential help line, emergency safe house, counseling, advocacy and education  If you or someone your know are feeling as though you are going to hurt yourself, do not wait - GET HELP RIGHT AWAY  Go to the closest Emergency Room, call 911, or call someone you trust, family member or friend to be with you until you can get some help

## 2022-09-07 NOTE — ASSESSMENT & PLAN NOTE
Stable  Continue Cymbalta 90mg daily  D/C Rexulti 2 mg nightly due to fatigue  Start Seroquel 50gm QHS  Strongly advise counseling and couples' counseling  Smart phone nathaniel list and counseling list provided today

## 2022-09-07 NOTE — PROGRESS NOTES
Assessment/Plan:  Problem List Items Addressed This Visit        Endocrine    Hypogonadism in male     Management per [facility]  S/p Androgel initiation  Respiratory    RUKHSANA on CPAP     Continue CPAP  Management per Sleep Medicine  Cardiovascular and Mediastinum    Benign essential hypertension     Stable  Check blood pressure outside of office  Recommend lifestyle modifications  Other    Obesity, unspecified     Stable  Recommend lifestyle modifications  Generalized anxiety disorder     Stable  Continue Cymbalta 90mg daily  D/C Rexulti 2 mg nightly due to fatigue  Start Seroquel 50gm QHS  Strongly advise counseling and couples' counseling  Smart phone nathaniel list and counseling list provided today  Relevant Medications    QUEtiapine (SEROquel) 50 mg tablet    Bipolar 2 disorder (HCC) - Primary     Stable  Continue Cymbalta 90mg daily  D/C Rexulti 2 mg nightly due to fatigue  Start Seroquel 50gm QHS  Strongly advise counseling and couples' counseling  Smart phone nathaniel list and counseling list provided today  Relevant Medications    QUEtiapine (SEROquel) 50 mg tablet    Class 2 severe obesity with serious comorbidity and body mass index (BMI) of 39 0 to 39 9 in adult (HCC)     Stable  Recommend lifestyle modifications  Return if symptoms worsen or fail to improve  Future Appointments   Date Time Provider Oh Clement   10/6/2022  1:20 PM Ion Reid DO [facility]   2/20/2023  1:15 PM Ramona Hurt PA-C [facility]        Subjective:     Jose Ma is a 63 y o  male who presents today for a follow-up on his chronic medical conditions  HPI:  Chief Complaint   Patient presents with    Follow-up    Anxiety    Depression     -- Above per clinical staff and reviewed   --      HPI      Today:    Return in about 4 months (around 10/1/2022) for 4mo - IFG, HTN, HL, Anx, BPD, Low T, RUKHSANA, M Obesity, Labs        F/U Anx, BPD, Low T, RUKHSANA, M Obesity, No Labs     PTO c wife Noe Moreira       Retired late 9/21          Morbid Obesity - [employer] - avoiding salt in food, but eating increased portions of healthy good   No Regular exercise - Previously Swimming for 30 minutes, 4 days per week; previously Walking with wife 45 minutes, 4 times per week; bike riding 35 minutes, 2 times per week  Fatigue - Always tired, yawning x couple months  Wife is upset that he has not met any of his [facility] goals that he intended to accomplish upon [facility] 9/21  They previously went to couples' therapy  Wife states that patient does not have any friends  Wife is worried that patient will turn into his father, who was a shut in, which patient does to want        IFG - Patient never started Metformin XR 500mg 3 pills QD x 4 months as it was not covered by his insurance  S/p Pre-DM education - last attended 3/9/22, insurance would no longer cover           HTN - On Lisinopril 30mg QD   No higher dose or other HTN Rx previously   No BP check outside of office  Matheus Beasley has an arm BP cuff at home   Stable Echo 10/10/19        Hyperlipidemia - On Lipitor 20mg QHS   No higher doses   Unsure of other statin name that he had tried previously - ineffective   s/p clean cardiac cath 10/4/19        RUKHSANA on CPAP - Uses CPAP regularly   Management per Sleep Specialist on [address] - Dr Dmitriy Durham   Last seen 12/21   Next appt 12/22   Last sleep study 2017?, no weight changes in the interim        Bipolar Disorder - Feels more irritable x couple months  He does not interact much with others except his wife, but other can make him irritable  He previously requested to decrease his Cymbalta dose from 90mg QD to 60mg QD due to decreased stress in [employer], but noticed worsening mood and Cymbalta 90mg QD was resumed 4/11/22  Feels motivation is down   On Cymbalta 90mg QD, Rexulti 2mg QHS - has not been on higher doses    D/C Lamictal due to hives   Poor social supports   Last counseling 2018 - helpful   No SI/HI/AH/VH        Anxiety - On increased Cymbalta 90mg QD   He no longer feels drained by his anxiety due to increased work stressors, and is able to cope better   Feels 90% improved   Previously on Zoloft - ineffective          PHQ-2/9 Depression Screening    Little interest or pleasure in doing things: 2 - more than half the days  Feeling down, depressed, or hopeless: 2 - more than half the days  Trouble falling or staying asleep, or sleeping too much: 3 - nearly every day  Feeling tired or having little energy: 3 - nearly every day  Poor appetite or overeatin - several days  Feeling bad about yourself - or that you are a failure or have let yourself or your family down: 0 - not at all  Trouble concentrating on things, such as reading the newspaper or watching television: 0 - not at all  Moving or speaking so slowly that other people could have noticed  Or the opposite - being so fidgety or restless that you have been moving around a lot more than usual: 0 - not at all  Thoughts that you would be better off dead, or of hurting yourself in some way: 0 - not at all  PHQ-2 Score: 4  PHQ-2 Interpretation: POSITIVE depression screen  PHQ-9 Score: 11   PHQ-9 Interpretation: Moderate depression          VENANCIO-7 Flowsheet Screening    Flowsheet Row Most Recent Value   Over the last 2 weeks, how often have you been bothered by any of the following problems? Feeling nervous, anxious, or on edge 0   Not being able to stop or control worrying 0   Worrying too much about different things 0   Trouble relaxing 0   Being so restless that it is hard to sit still 0   Becoming easily annoyed or irritable 1   Feeling afraid as if something awful might happen 0   VENANCIO-7 Total Score 1           MDQ:  2, Asynchronous, No Problem    Low Testosterone - x 2 20 and 20   Management per [facility] Ms Jessy Frias PA-C  Next appt     On Androgel since 5/30/22  Low libido, + ED  No improvement in mood or fatigue since Androgel started 5/30/22                  From previous note:    Return in about 4 months (around 6/1/2022) for 4mo - IFG, HTN, HL, Anx, BPD, Low T, RUKHSANA, M Obesity, Labs      4mo OV     Retired late 9/21           Morbid Obesity - [employer] - avoiding salt in food, but eating increased portions of healthy good   No Regular exercise - Previously Swimming for 30 minutes, 4 days per week; previously Walking with wife 45 minutes, 4 times per week; bike riding 35 minutes, 2 times per week        IFG - Patient never started Metformin XR 500mg 3 pills QD x 4 months as it was not covered by his insurance  S/p Pre-DM education - last attended 3/9/22, insurance would no longer cover           HTN - On Lisinopril 30mg QD   No higher dose or other HTN Rx previously   No BP check outside of office  Wilian Graf has an arm BP cuff at home   Stable Echo 10/10/19        Hyperlipidemia - On Lipitor 20mg QHS   No higher doses   Unsure of other statin name that he had tried previously - ineffective   s/p clean cardiac cath 10/4/19        RUKHSANA on CPAP - Uses CPAP regularly   Management per Sleep Specialist on [address] - Dr Marino Foods   Last seen 12/21   Next appt 12/22   Last sleep study 2017?, no weight changes in the interim        Bipolar Disorder - Feels mood is improved   He previously requested to decrease his Cymbalta dose from 90mg QD to 60mg QD due to decreased stress in senior care, but noticed worsening mood and Cymbalta 60mg QD was resumed 4/11/22  Feels motivation is down   On Cymbalta 90mg QD, Rexulti 2mg QHS - has not been on higher doses   D/C Lamictal due to hives   Good social supports   Last counseling 2018 - helpful   No SI/HI/AH/VH        Anxiety - On increased Cymbalta 90mg QD   He no longer feels drained by his anxiety due to increased work stressors, and is able to cope better   Feels 90% improved   Previously on Zoloft - ineffective           PHQ-2/9 Depression Screening       Little interest or pleasure in doing things: 0 - not at all  Feeling down, depressed, or hopeless: 0 - not at all  Trouble falling or staying asleep, or sleeping too much: 1 - several days  Feeling tired or having little energy: 1 - several days  Poor appetite or overeatin - not at all  Feeling bad about yourself - or that you are a failure or have let yourself or your family down: 0 - not at all  Trouble concentrating on things, such as reading the newspaper or watching television: 0 - not at all  Moving or speaking so slowly that other people could have noticed  Or the opposite - being so fidgety or restless that you have been moving around a lot more than usual: 0 - not at all  Thoughts that you would be better off dead, or of hurting yourself in some way: 0 - not at all  PHQ-2 Score: 0  PHQ-2 Interpretation: Negative depression screen  PHQ-9 Score: 2   PHQ-9 Interpretation: No or Minimal depression                    VENANCIO-7 Flowsheet Screening    Flowsheet Row Most Recent Value   Over the last 2 weeks, how often have you been bothered by any of the following problems?     Feeling nervous, anxious, or on edge 0   Not being able to stop or control worrying 0   Worrying too much about different things 0   Trouble relaxing 0   Being so restless that it is hard to sit still 0   Becoming easily annoyed or irritable 0   Feeling afraid as if something awful might happen 0   VENANCIO-7 Total Score 0          MDQ:  2, Asynchronous, No Problem        Asthma - Triggered by allergies   Uses Albuterol HFA PRN and Breo 100-25 1 puff QD PRN   Last use 3/20   No ICS previously  Filippo craig  per Asthma / Devang Velasquez for appointment   ACT 25 on 21        Low Testosterone - x 2 20 and 20   Management per [facility] HOMERO Delacruz-C  Next appt   On Androgel since 22  Low libido, + ED          Soledad's Syndrome - x 2   S/p Antibiotics   Never followed c Rheum        H/O Colon Polyps - Colonoscopy per GI 2013 Dr Shelley Carranza - next colonoscopy 2023        Reviewed:  Labs 6/1/22, [facility] 8/18/22              The following portions of the patient's history were reviewed and updated as appropriate: allergies, current medications, past family history, past medical history, past social history, past surgical history and problem list       Review of Systems   Constitutional: Positive for fatigue  Negative for appetite change, chills, diaphoresis and fever  Respiratory: Negative for chest tightness and shortness of breath  Cardiovascular: Negative for chest pain  Gastrointestinal: Negative for abdominal pain, blood in stool, diarrhea, nausea and vomiting  Genitourinary: Negative for dysuria  Current Outpatient Medications   Medication Sig Dispense Refill    atorvastatin (LIPITOR) 20 mg tablet Take 1 tablet (20 mg total) by mouth daily at bedtime 90 tablet 2    cholecalciferol (VITAMIN D3) 1,000 units tablet Take 1,000 Units by mouth daily      Cyanocobalamin (B-12) 1000 MCG TABS       DULoxetine (CYMBALTA) 30 mg delayed release capsule Take 1 capsule (30 mg total) by mouth daily Take with 60mg pill for a total of 90mg daily  90 capsule 2    DULoxetine (CYMBALTA) 60 mg delayed release capsule Take 1 capsule (60 mg total) by mouth daily Take with 30mg pill for a total of 90mg daily  90 capsule 2    lisinopril (ZESTRIL) 30 mg tablet Take 1 tablet (30 mg total) by mouth daily 90 tablet 2    Magnesium 400 MG TABS       QUEtiapine (SEROquel) 50 mg tablet 1/2 tab by mouth at bedtime x 1 week, then increase 1 tab by mouth at bedtime thereafter  30 tablet 1    testosterone (ANDROGEL) 1% Apply 1 packet (50 mg total) topically daily 90 packet 1     No current facility-administered medications for this visit         Objective:  /76 (BP Location: Left arm, Patient Position: Sitting, Cuff Size: Large)   Pulse 89   Temp (!) 97 1 °F (36 2 °C)   Resp 18   Ht 5' 7" (1 702 m)   Wt 115 kg (252 lb 12 8 oz)   SpO2 97%   BMI 39 59 kg/m²    Wt Readings from Last 3 Encounters:   09/07/22 115 kg (252 lb 12 8 oz)   08/18/22 114 kg (251 lb)   06/01/22 112 kg (248 lb)      BP Readings from Last 3 Encounters:   09/07/22 128/76   08/18/22 138/88   06/01/22 128/78          Physical Exam  Vitals and nursing note reviewed  Constitutional:       Appearance: Normal appearance  He is well-developed  He is obese  HENT:      Head: Normocephalic and atraumatic  Eyes:      Conjunctiva/sclera: Conjunctivae normal    Neck:      Thyroid: No thyromegaly  Cardiovascular:      Rate and Rhythm: Normal rate and regular rhythm  Heart sounds: Normal heart sounds  Pulmonary:      Effort: Pulmonary effort is normal       Breath sounds: Normal breath sounds  Musculoskeletal:         General: No swelling or tenderness  Cervical back: Neck supple  Right lower leg: No edema  Left lower leg: No edema  Neurological:      General: No focal deficit present  Mental Status: He is alert and oriented to person, place, and time  Psychiatric:         Attention and Perception: Attention and perception normal          Mood and Affect: Mood is depressed  Speech: Speech normal          Behavior: Behavior normal  Behavior is cooperative  Thought Content:  Thought content normal          Cognition and Memory: Cognition and memory normal          Judgment: Judgment normal          Lab Results:      Lab Results   Component Value Date    WBC 8 14 08/17/2022    HGB 15 7 08/17/2022    HCT 47 9 08/17/2022     08/17/2022    TRIG 391 (H) 06/01/2022    HDL 35 (L) 06/01/2022    LDLDIRECT 89 06/01/2022    ALT 27 06/01/2022    AST 16 06/01/2022    K 4 3 06/01/2022     06/01/2022    CREATININE 1 01 06/01/2022    BUN 19 06/01/2022    CO2 25 06/01/2022    PSA 1 3 08/17/2022    INR 1 0 10/01/2019    GLUF 86 06/01/2022    HGBA1C 5 4 06/01/2022     No results found for: URICACID  Invalid input(s): BASENAME Vitamin D    No results found       POCT Labs

## 2022-09-29 ENCOUNTER — VBI (OUTPATIENT)
Dept: ADMINISTRATIVE | Facility: OTHER | Age: 62
End: 2022-09-29

## 2022-10-06 ENCOUNTER — OFFICE VISIT (OUTPATIENT)
Dept: FAMILY MEDICINE CLINIC | Facility: CLINIC | Age: 62
End: 2022-10-06
Payer: COMMERCIAL

## 2022-10-06 VITALS
SYSTOLIC BLOOD PRESSURE: 138 MMHG | RESPIRATION RATE: 18 BRPM | WEIGHT: 253 LBS | HEART RATE: 100 BPM | OXYGEN SATURATION: 96 % | TEMPERATURE: 97.8 F | DIASTOLIC BLOOD PRESSURE: 82 MMHG | HEIGHT: 67 IN | BODY MASS INDEX: 39.71 KG/M2

## 2022-10-06 DIAGNOSIS — R79.89 LOW TESTOSTERONE: ICD-10-CM

## 2022-10-06 DIAGNOSIS — Z23 ENCOUNTER FOR IMMUNIZATION: ICD-10-CM

## 2022-10-06 DIAGNOSIS — Z13.6 SCREENING FOR CARDIOVASCULAR CONDITION: ICD-10-CM

## 2022-10-06 DIAGNOSIS — Z13.1 DIABETES MELLITUS SCREENING: ICD-10-CM

## 2022-10-06 DIAGNOSIS — E29.1 HYPOGONADISM IN MALE: ICD-10-CM

## 2022-10-06 DIAGNOSIS — Z86.010 HISTORY OF COLON POLYPS: ICD-10-CM

## 2022-10-06 DIAGNOSIS — E29.1 TESTICULAR HYPOFUNCTION: ICD-10-CM

## 2022-10-06 DIAGNOSIS — F41.1 GENERALIZED ANXIETY DISORDER: ICD-10-CM

## 2022-10-06 DIAGNOSIS — F31.81 BIPOLAR 2 DISORDER (HCC): ICD-10-CM

## 2022-10-06 DIAGNOSIS — I10 BENIGN ESSENTIAL HYPERTENSION: ICD-10-CM

## 2022-10-06 DIAGNOSIS — M79.10 MYALGIA: ICD-10-CM

## 2022-10-06 DIAGNOSIS — E66.01 CLASS 2 SEVERE OBESITY WITH SERIOUS COMORBIDITY AND BODY MASS INDEX (BMI) OF 39.0 TO 39.9 IN ADULT, UNSPECIFIED OBESITY TYPE (HCC): ICD-10-CM

## 2022-10-06 DIAGNOSIS — Z99.89 OSA ON CPAP: ICD-10-CM

## 2022-10-06 DIAGNOSIS — E78.2 MIXED HYPERLIPIDEMIA: ICD-10-CM

## 2022-10-06 DIAGNOSIS — G47.33 OSA ON CPAP: ICD-10-CM

## 2022-10-06 DIAGNOSIS — J45.909 ASTHMA DUE TO SEASONAL ALLERGIES: ICD-10-CM

## 2022-10-06 DIAGNOSIS — E78.1 HYPERTRIGLYCERIDEMIA: ICD-10-CM

## 2022-10-06 DIAGNOSIS — R73.01 IFG (IMPAIRED FASTING GLUCOSE): Primary | ICD-10-CM

## 2022-10-06 PROCEDURE — 99214 OFFICE O/P EST MOD 30 MIN: CPT | Performed by: FAMILY MEDICINE

## 2022-10-06 RX ORDER — LISINOPRIL 30 MG/1
30 TABLET ORAL DAILY
Qty: 90 TABLET | Refills: 2 | Status: SHIPPED | OUTPATIENT
Start: 2022-10-06

## 2022-10-06 RX ORDER — TESTOSTERONE GEL, 1% 10 MG/G
50 GEL TRANSDERMAL DAILY
Qty: 90 PACKET | Refills: 1 | Status: CANCELLED | OUTPATIENT
Start: 2022-10-06

## 2022-10-06 RX ORDER — QUETIAPINE FUMARATE 25 MG/1
25 TABLET, FILM COATED ORAL
Qty: 90 TABLET | Refills: 2 | Status: SHIPPED | OUTPATIENT
Start: 2022-10-06

## 2022-10-06 RX ORDER — ATORVASTATIN CALCIUM 20 MG/1
20 TABLET, FILM COATED ORAL
Qty: 90 TABLET | Refills: 2 | Status: SHIPPED | OUTPATIENT
Start: 2022-10-06

## 2022-10-06 RX ORDER — QUETIAPINE FUMARATE 50 MG/1
TABLET, FILM COATED ORAL
Qty: 30 TABLET | Refills: 1 | Status: CANCELLED | OUTPATIENT
Start: 2022-10-06

## 2022-10-06 RX ORDER — DULOXETIN HYDROCHLORIDE 60 MG/1
60 CAPSULE, DELAYED RELEASE ORAL DAILY
Qty: 90 CAPSULE | Refills: 2 | Status: SHIPPED | OUTPATIENT
Start: 2022-10-06

## 2022-10-06 RX ORDER — DULOXETIN HYDROCHLORIDE 30 MG/1
30 CAPSULE, DELAYED RELEASE ORAL DAILY
Qty: 90 CAPSULE | Refills: 2 | Status: SHIPPED | OUTPATIENT
Start: 2022-10-06

## 2022-10-06 NOTE — PATIENT INSTRUCTIONS
Please contact your insurance if you are uncertain of coverage for plan of care items  Your insurance may not cover the cost of your Vitamin D blood test, which is approximately $65-70  Please notify the lab prior to blood draw if you would like to decline this test         Wellness Visit for Adults   AMBULATORY CARE:   A wellness visit  is when you see your healthcare provider to get screened for health problems  Your healthcare provider will also give you advice on how to stay healthy  Write down your questions so you remember to ask them  Ask your healthcare provider how often you should have a wellness visit  What happens at a wellness visit:  Your healthcare provider will ask about your health, and your family history of health problems  This includes high blood pressure, heart disease, and cancer  He or she will ask if you have symptoms that concern you, if you smoke, and about your mood  You may also be asked about your intake of medicines, supplements, food, and alcohol  Any of the following may be done: Your weight  will be checked  Your height may also be checked so your body mass index (BMI) can be calculated  Your BMI shows if you are at a healthy weight  Your blood pressure  and heart rate will be checked  Your temperature may also be checked  Blood and urine tests  may be done  Blood tests may be done to check your cholesterol levels  Abnormal cholesterol levels increase your risk for heart disease and stroke  You may also need a blood or urine test to check for diabetes if you are at increased risk  Urine tests may be done to look for signs of an infection or kidney disease  A physical exam  includes checking your heartbeat and lungs with a stethoscope  Your healthcare provider may also check your skin to look for sun damage  Screening tests  may be recommended  A screening test is done to check for diseases that may not cause symptoms   The screening tests you may need depend on your age, gender, family history, and lifestyle habits  For example, colorectal screening may be recommended if you are 48years old or older  Screening tests you need if you are a woman:   A Pap smear  is used to screen for cervical cancer  Pap smears are usually done every 3 to 5 years depending on your age  You may need them more often if you have had abnormal Pap smear test results in the past  Ask your healthcare provider how often you should have a Pap smear  A mammogram  is an x-ray of your breasts to screen for breast cancer  Experts recommend mammograms every 2 years starting at age 48 years  You may need a mammogram at age 52 years or younger if you have an increased risk for breast cancer  Talk to your healthcare provider about when you should start having mammograms and how often you need them  Vaccines you may need:   Get an influenza vaccine  every year  The influenza vaccine protects you from the flu  Several types of viruses cause the flu  The viruses change over time, so new vaccines are made each year  Get a tetanus-diphtheria (Td) booster vaccine  every 10 years  This vaccine protects you against tetanus and diphtheria  Tetanus is a severe infection that may cause painful muscle spasms and lockjaw  Diphtheria is a severe bacterial infection that causes a thick covering in the back of your mouth and throat  Get a human papillomavirus (HPV) vaccine  if you are female and aged 23 to 32 or male 23 to 24 and never received it  This vaccine protects you from HPV infection  HPV is the most common infection spread by sexual contact  HPV may also cause vaginal, penile, and anal cancers  Get a pneumococcal vaccine  if you are aged 72 years or older  The pneumococcal vaccine is an injection given to protect you from pneumococcal disease  Pneumococcal disease is an infection caused by pneumococcal bacteria  The infection may cause pneumonia, meningitis, or an ear infection      Get a shingles vaccine  if you are 61 or older, even if you have had shingles before  The shingles vaccine is an injection to protect you from the varicella-zoster virus  This is the same virus that causes chickenpox  Shingles is a painful rash that develops in people who had chickenpox or have been exposed to the virus  How to eat healthy:  My Plate is a model for planning healthy meals  It shows the types and amounts of foods that should go on your plate  Fruits and vegetables make up about half of your plate, and grains and protein make up the other half  A serving of dairy is included on the side of your plate  The amount of calories and serving sizes you need depends on your age, gender, weight, and height  Examples of healthy foods are listed below:  Eat a variety of vegetables  such as dark green, red, and orange vegetables  You can also include canned vegetables low in sodium (salt) and frozen vegetables without added butter or sauces  Eat a variety of fresh fruits , canned fruit in 100% juice, frozen fruit, and dried fruit  Include whole grains  At least half of the grains you eat should be whole grains  Examples include whole-wheat bread, wheat pasta, brown rice, and whole-grain cereals such as oatmeal     Eat a variety of protein foods such as seafood (fish and shellfish), lean meat, and poultry without skin (turkey and chicken)  Examples of lean meats include pork leg, shoulder, or tenderloin, and beef round, sirloin, tenderloin, and extra lean ground beef  Other protein foods include eggs and egg substitutes, beans, peas, soy products, nuts, and seeds  Choose low-fat dairy products such as skim or 1% milk or low-fat yogurt, cheese, and cottage cheese  Limit unhealthy fats  such as butter, hard margarine, and shortening  Exercise:  Exercise at least 30 minutes per day on most days of the week  Some examples of exercise include walking, biking, dancing, and swimming   You can also fit in more physical activity by taking the stairs instead of the elevator or parking farther away from stores  Include muscle strengthening activities 2 days each week  Regular exercise provides many health benefits  It helps you manage your weight, and decreases your risk for type 2 diabetes, heart disease, stroke, and high blood pressure  Exercise can also help improve your mood  Ask your healthcare provider about the best exercise plan for you  General health and safety guidelines:   Do not smoke  Nicotine and other chemicals in cigarettes and cigars can cause lung damage  Ask your healthcare provider for information if you currently smoke and need help to quit  E-cigarettes or smokeless tobacco still contain nicotine  Talk to your healthcare provider before you use these products  Limit alcohol  A drink of alcohol is 12 ounces of beer, 5 ounces of wine, or 1½ ounces of liquor  Lose weight, if needed  Being overweight increases your risk of certain health conditions  These include heart disease, high blood pressure, type 2 diabetes, and certain types of cancer  Protect your skin  Do not sunbathe or use tanning beds  Use sunscreen with a SPF 15 or higher  Apply sunscreen at least 15 minutes before you go outside  Reapply sunscreen every 2 hours  Wear protective clothing, hats, and sunglasses when you are outside  Drive safely  Always wear your seatbelt  Make sure everyone in your car wears a seatbelt  A seatbelt can save your life if you are in an accident  Do not use your cell phone when you are driving  This could distract you and cause an accident  Pull over if you need to make a call or send a text message  Practice safe sex  Use latex condoms if are sexually active and have more than one partner  Your healthcare provider may recommend screening tests for sexually transmitted infections (STIs)  Wear helmets, lifejackets, and protective gear    Always wear a helmet when you ride a bike or motorcycle, go skiing, or play sports that could cause a head injury  Wear protective equipment when you play sports  Wear a lifejacket when you are on a boat or doing water sports  © Copyright [employer] 2022 Information is for End User's use only and may not be sold, redistributed or otherwise used for commercial purposes  All illustrations and images included in CareNotes® are the copyrighted property of A D A M , Inc  or [facility] Patricia Mckeon   The above information is an  only  It is not intended as medical advice for individual conditions or treatments  Talk to your doctor, nurse or pharmacist before following any medical regimen to see if it is safe and effective for you

## 2022-10-06 NOTE — PROGRESS NOTES
Assessment/Plan:  Problem List Items Addressed This Visit        Endocrine    IFG (impaired fasting glucose) - Primary     Pending labs  Insurance would not cover Metformin XR 500mg 3 pills QD or pre-DM education  Recommend lifestyle modifications  To consider short-acting Metformin in future PRN? Relevant Orders    Comprehensive metabolic panel    Hemoglobin A1C    Hypogonadism in male     Management per [facility]  S/p Androgel initiation  Respiratory    RUKHSANA on CPAP     Continue CPAP  Management per Sleep Medicine  Asthma due to seasonal allergies     Stable  Management per asthma/allergy-overdue for appointment  Cardiovascular and Mediastinum    Benign essential hypertension     Borderline BP  Check blood pressure outside of office  Recommend lifestyle modifications  Relevant Medications    lisinopril (ZESTRIL) 30 mg tablet    Other Relevant Orders    CBC and differential    Comprehensive metabolic panel       Other    Mixed hyperlipidemia     Pending labs  Continue Lipitor 20 mg nightly  Recommend lifestyle modifications  Relevant Medications    atorvastatin (LIPITOR) 20 mg tablet    Other Relevant Orders    Lipid panel    TSH, 3rd generation with Free T4 reflex    LDL cholesterol, direct    Obesity, unspecified     Stable  Recommend lifestyle modifications  Relevant Orders    TSH, 3rd generation with Free T4 reflex    Vitamin D 25 hydroxy    Generalized anxiety disorder     Stable  Continue Cymbalta 90mg daily, Seroquel 50gm QHS  Strongly advise counseling and couples' counseling  Smart phone nathaniel list and counseling list provided previously  Relevant Medications    DULoxetine (CYMBALTA) 30 mg delayed release capsule    DULoxetine (CYMBALTA) 60 mg delayed release capsule    QUEtiapine (SEROquel) 25 mg tablet    Other Relevant Orders    TSH, 3rd generation with Free T4 reflex    Bipolar 2 disorder (HCC)     Stable  Continue Cymbalta 90mg daily, Seroquel 50gm QHS  Strongly advise counseling and couples' counseling  Smart phone nathaniel list and counseling list provided previously  Relevant Medications    DULoxetine (CYMBALTA) 30 mg delayed release capsule    DULoxetine (CYMBALTA) 60 mg delayed release capsule    QUEtiapine (SEROquel) 25 mg tablet    Other Relevant Orders    TSH, 3rd generation with Free T4 reflex    Hypertriglyceridemia     Pending labs  Previously, patient wished to start OTC fish oil 4 g daily  To consider starting fenofibrate 160 mg daily or Lovaza in the future p r n ? Recommend lifestyle modifications  Relevant Medications    atorvastatin (LIPITOR) 20 mg tablet    Other Relevant Orders    Lipid panel    LDL cholesterol, direct    History of colon polyps     Colonoscopy is up to date  Other Visit Diagnoses     Low testosterone        Testicular hypofunction        Encounter for immunization        Relevant Orders    influenza vaccine, quadrivalent, recombinant, PF, 0 5 mL, for patients 18 yr+ (FLUBLOK) (Completed)    Myalgia        Relevant Orders    Vitamin D 25 hydroxy    Screening for cardiovascular condition        Relevant Orders    CBC and differential    Comprehensive metabolic panel    Lipid panel    LDL cholesterol, direct    Diabetes mellitus screening        Relevant Orders    Hemoglobin A1C           Return in about 4 months (around 2/6/2023) for Physical / 4mo - IFG, HTN, HL, Anx, BPD, Low T, RUKHSANA, M Obesity, Labs  Future Appointments   Date Time Provider Oh Clement   2/9/2023  1:00 PM Emy Willis DO  [facility]   2/20/2023  1:15 PM Juan Miguel Ng PA-C [facility]        Subjective:     Amaya Cheung is a 63 y o  male who presents today for a follow-up on his chronic medical conditions  HPI:  Chief Complaint   Patient presents with    Follow-up     4 mo f/u  No new problems or concerns at this time       hm     Will schedule covid booster at another time  Due for flu shot, would like to receive today during visit   Medication Refill     Refills of prescriptions pending       -- Above per clinical staff and reviewed  --      HPI      Today:    Return in about 4 months (around 10/1/2022) for 4mo - IFG, HTN, HL, Anx, BPD, Low T, RUKHSANA, M Obesity, Labs       4mo OV    Did not complete labs 6/1/22     PTO c wife Matty Sim        Retired late 9/21           [address] Forged in food, but eating increased portions of healthy good   No Regular exercise - Previously Swimming for 30 minutes, 4 days per week; previously Walking with wife 45 minutes, 4 times per week; bike riding 35 minutes, 2 times per week        Fatigue - Feels 90% improved c taking Seroquel 50mg QHS x 6 weeks -  only taking 1/2 pill as felt too tired on full pill after taking for 2-3 weeks and D/C Rexulti 2mg QHS due to fatigue       IFG - Patient never started Metformin XR 500mg 3 pills QD as it was not covered by his insurance   S/p Pre-DM education - last attended 3/9/22, insurance would no longer cover           HTN - On Lisinopril 30mg QD   No higher dose or other HTN Rx previously   No BP check outside of office  Barry Arce has an arm BP cuff at home   Stable Echo 10/10/19        Hyperlipidemia - On Lipitor 20mg QHS   No higher doses   Unsure of other statin name that he had tried previously - ineffective   s/p clean cardiac cath 10/4/19        RUKHSANA on CPAP - Uses CPAP regularly   Management per Sleep Specialist on [address] - Dr Melo Lopez   Last seen 12/21   Next appt 11/22   Last sleep study 2017?, no weight changes in the interim        Bipolar Disorder - On Seroquel 50mg QHS x 6 weeks - only taking 1/2 pill as felt too tired on full pill after taking for 2-3 weeks  D/C Rexulti 2mg QHS due to fatigue  Feels 90% improved  Has more energy, no longer napping, not irritable, improved motivation  Eliz Hamilton   He previously requested to decrease his Cymbalta dose from 90mg QD to 60mg QD due to decreased stress in correction, but noticed worsening mood and Cymbalta 90mg QD was resumed 22  D/C Lamictal due to hives   Good social supports   Last counseling 2018 - helpful   No SI/HI/AH/VH  He is on waiting list for individual, but not couple's, counseling as of 10/6/22        Anxiety - On Seroquel 50mg QHS x 6 weeks -  only taking 1/2 pill as felt too tired on full pill after taking for 2-3 weeks  D/C Rexulti 2mg QHS due to fatigue  On increased Cymbalta 90mg QD   He no longer feels drained by his anxiety due to increased work stressors, and is able to cope better   Feels 90% improved   Previously on Zoloft - ineffective           PHQ-2/9 Depression Screening    Little interest or pleasure in doing things: 0 - not at all  Feeling down, depressed, or hopeless: 0 - not at all  Trouble falling or staying asleep, or sleeping too much: 0 - not at all  Feeling tired or having little energy: 0 - not at all  Poor appetite or overeatin - not at all  Feeling bad about yourself - or that you are a failure or have let yourself or your family down: 0 - not at all  Trouble concentrating on things, such as reading the newspaper or watching television: 0 - not at all  Moving or speaking so slowly that other people could have noticed  Or the opposite - being so fidgety or restless that you have been moving around a lot more than usual: 0 - not at all  Thoughts that you would be better off dead, or of hurting yourself in some way: 0 - not at all  PHQ-2 Score: 0  PHQ-2 Interpretation: Negative depression screen  PHQ-9 Score: 0   PHQ-9 Interpretation: No or Minimal depression          VENANCIO-7 Flowsheet Screening    Flowsheet Row Most Recent Value   Over the last 2 weeks, how often have you been bothered by any of the following problems?     Feeling nervous, anxious, or on edge 0   Not being able to stop or control worrying 0   Worrying too much about different things 0 Trouble relaxing 0   Being so restless that it is hard to sit still 0   Becoming easily annoyed or irritable 0   Feeling afraid as if something awful might happen 0   VENANCIO-7 Total Score 0            MDQ:  1, Asynchronous, No Problem      Low Testosterone - x 2 6/29/20 and 11/25/20   Management per [facility] Ms  Mao Penn PA-C   Next appt 2/23   On Androgel since 5/30/22   Low libido, + ED  No improvement in mood or fatigue since Androgel started 5/30/22         Asthma - Triggered by allergies   Uses Albuterol HFA PRN and Breo 100-25 1 puff QD PRN   Last use 3/20   No ICS previously  Noreen Mallorie rafa 20019 per Asthma / Allergist   Overdue for appointment   ACT 25 on 8/31/21          Soledad's Syndrome - x 2   S/p Antibiotics   Never followed c Rheum        H/O Colon Polyps - Colonoscopy per [facility] [address] [facility] - next colonoscopy 2023        Reviewed:  Labs 6/1/22, [facility] 8/18/22           The following portions of the patient's history were reviewed and updated as appropriate: allergies, current medications, past family history, past medical history, past social history, past surgical history and problem list       Review of Systems   Constitutional: Negative for appetite change, chills, diaphoresis, fatigue and fever  Respiratory: Negative for chest tightness and shortness of breath  Cardiovascular: Negative for chest pain  Gastrointestinal: Negative for abdominal pain, blood in stool, diarrhea, nausea and vomiting  Genitourinary: Negative for dysuria  Current Outpatient Medications   Medication Sig Dispense Refill    atorvastatin (LIPITOR) 20 mg tablet Take 1 tablet (20 mg total) by mouth daily at bedtime 90 tablet 2    cholecalciferol (VITAMIN D3) 1,000 units tablet Take 1,000 Units by mouth daily      Cyanocobalamin (B-12) 1000 MCG TABS       DULoxetine (CYMBALTA) 30 mg delayed release capsule Take 1 capsule (30 mg total) by mouth daily Take with 60mg pill for a total of 90mg daily   90 capsule 2  DULoxetine (CYMBALTA) 60 mg delayed release capsule Take 1 capsule (60 mg total) by mouth daily Take with 30mg pill for a total of 90mg daily  90 capsule 2    lisinopril (ZESTRIL) 30 mg tablet Take 1 tablet (30 mg total) by mouth daily 90 tablet 2    Magnesium 400 MG TABS       QUEtiapine (SEROquel) 25 mg tablet Take 1 tablet (25 mg total) by mouth daily at bedtime 90 tablet 2    QUEtiapine (SEROquel) 50 mg tablet 1/2 tab by mouth at bedtime x 1 week, then increase 1 tab by mouth at bedtime thereafter  (Patient taking differently: 25 mg 1/2 tab by mouth at bedtime thereafter ) 30 tablet 1    testosterone (ANDROGEL) 1% Apply 1 packet (50 mg total) topically daily 90 packet 1     No current facility-administered medications for this visit  Objective:  /82   Pulse 100   Temp 97 8 °F (36 6 °C)   Resp 18   Ht 5' 7" (1 702 m)   Wt 115 kg (253 lb)   SpO2 96%   BMI 39 63 kg/m²    Wt Readings from Last 3 Encounters:   10/06/22 115 kg (253 lb)   09/07/22 115 kg (252 lb 12 8 oz)   08/18/22 114 kg (251 lb)      BP Readings from Last 3 Encounters:   10/06/22 138/82   09/07/22 128/76   08/18/22 138/88          Physical Exam  Vitals and nursing note reviewed  Constitutional:       Appearance: Normal appearance  He is well-developed  He is obese  HENT:      Head: Normocephalic and atraumatic  Eyes:      Conjunctiva/sclera: Conjunctivae normal    Neck:      Thyroid: No thyromegaly  Vascular: No carotid bruit  Cardiovascular:      Rate and Rhythm: Normal rate and regular rhythm  Pulses: Normal pulses  Heart sounds: Normal heart sounds  Pulmonary:      Effort: Pulmonary effort is normal       Breath sounds: Normal breath sounds  Abdominal:      General: Bowel sounds are normal  There is no distension  Palpations: Abdomen is soft  There is no mass  Tenderness: There is no abdominal tenderness  There is no guarding or rebound     Musculoskeletal:         General: No swelling or tenderness  Cervical back: Neck supple  Right lower leg: No edema  Left lower leg: No edema  Neurological:      General: No focal deficit present  Mental Status: He is alert and oriented to person, place, and time  Psychiatric:         Mood and Affect: Mood normal          Behavior: Behavior normal          Thought Content: Thought content normal          Judgment: Judgment normal          Lab Results:      Lab Results   Component Value Date    WBC 8 14 08/17/2022    HGB 15 7 08/17/2022    HCT 47 9 08/17/2022     08/17/2022    TRIG 391 (H) 06/01/2022    HDL 35 (L) 06/01/2022    LDLDIRECT 89 06/01/2022    ALT 27 06/01/2022    AST 16 06/01/2022    K 4 3 06/01/2022     06/01/2022    CREATININE 1 01 06/01/2022    BUN 19 06/01/2022    CO2 25 06/01/2022    PSA 1 3 08/17/2022    INR 1 0 10/01/2019    GLUF 86 06/01/2022    HGBA1C 5 4 06/01/2022     No results found for: URICACID  Invalid input(s): BASENAME Vitamin D    No results found       POCT Labs

## 2022-10-07 PROCEDURE — 90471 IMMUNIZATION ADMIN: CPT

## 2022-10-07 PROCEDURE — 90682 RIV4 VACC RECOMBINANT DNA IM: CPT

## 2022-10-07 NOTE — ASSESSMENT & PLAN NOTE
Pending labs  Insurance would not cover Metformin XR 500mg 3 pills QD or pre-DM education  Recommend lifestyle modifications  To consider short-acting Metformin in future PRN?

## 2022-10-07 NOTE — ASSESSMENT & PLAN NOTE
Stable  Continue Cymbalta 90mg daily, Seroquel 50gm QHS  Strongly advise counseling and couples' counseling  Smart phone nathaniel list and counseling list provided previously

## 2022-10-07 NOTE — ASSESSMENT & PLAN NOTE
Pending labs  Previously, patient wished to start OTC fish oil 4 g daily  To consider starting fenofibrate 160 mg daily or Lovaza in the future p r n ? Recommend lifestyle modifications

## 2023-02-02 ENCOUNTER — RA CDI HCC (OUTPATIENT)
Dept: OTHER | Facility: HOSPITAL | Age: 63
End: 2023-02-02

## 2023-02-02 NOTE — PROGRESS NOTES
NOT on the BPA- please assess using MEAT for 2023 billing        [facility] [address]  coding opportunities          Chart Reviewed number of suggestions sent to Provider: 1     Patients Insurance        Commercial Insurance: [address] [place] [address]

## 2023-02-07 ENCOUNTER — LAB (OUTPATIENT)
Dept: LAB | Facility: CLINIC | Age: 63
End: 2023-02-07

## 2023-02-07 DIAGNOSIS — F41.1 GENERALIZED ANXIETY DISORDER: ICD-10-CM

## 2023-02-07 DIAGNOSIS — E78.1 HYPERTRIGLYCERIDEMIA: ICD-10-CM

## 2023-02-07 DIAGNOSIS — E78.2 MIXED HYPERLIPIDEMIA: ICD-10-CM

## 2023-02-07 DIAGNOSIS — F31.81 BIPOLAR 2 DISORDER (HCC): ICD-10-CM

## 2023-02-07 DIAGNOSIS — Z13.6 SCREENING FOR CARDIOVASCULAR CONDITION: ICD-10-CM

## 2023-02-07 DIAGNOSIS — R79.89 LOW TESTOSTERONE: ICD-10-CM

## 2023-02-07 DIAGNOSIS — R73.01 IFG (IMPAIRED FASTING GLUCOSE): ICD-10-CM

## 2023-02-07 DIAGNOSIS — E66.01 CLASS 2 SEVERE OBESITY WITH SERIOUS COMORBIDITY AND BODY MASS INDEX (BMI) OF 39.0 TO 39.9 IN ADULT, UNSPECIFIED OBESITY TYPE (HCC): ICD-10-CM

## 2023-02-07 DIAGNOSIS — I10 BENIGN ESSENTIAL HYPERTENSION: ICD-10-CM

## 2023-02-07 DIAGNOSIS — Z13.1 DIABETES MELLITUS SCREENING: ICD-10-CM

## 2023-02-07 DIAGNOSIS — M79.10 MYALGIA: ICD-10-CM

## 2023-02-07 LAB
25(OH)D3 SERPL-MCNC: 41.8 NG/ML (ref 30–100)
ALBUMIN SERPL BCP-MCNC: 4.3 G/DL (ref 3.5–5)
ALP SERPL-CCNC: 44 U/L (ref 34–104)
ALT SERPL W P-5'-P-CCNC: 24 U/L (ref 7–52)
ANION GAP SERPL CALCULATED.3IONS-SCNC: 7 MMOL/L (ref 4–13)
AST SERPL W P-5'-P-CCNC: 16 U/L (ref 13–39)
BASOPHILS # BLD AUTO: 0.05 THOUSANDS/ÂΜL (ref 0–0.1)
BASOPHILS NFR BLD AUTO: 1 % (ref 0–1)
BILIRUB SERPL-MCNC: 0.75 MG/DL (ref 0.2–1)
BUN SERPL-MCNC: 21 MG/DL (ref 5–25)
CALCIUM SERPL-MCNC: 9.6 MG/DL (ref 8.4–10.2)
CHLORIDE SERPL-SCNC: 104 MMOL/L (ref 96–108)
CHOLEST SERPL-MCNC: 216 MG/DL
CO2 SERPL-SCNC: 27 MMOL/L (ref 21–32)
CREAT SERPL-MCNC: 1.04 MG/DL (ref 0.6–1.3)
EOSINOPHIL # BLD AUTO: 0.21 THOUSAND/ÂΜL (ref 0–0.61)
EOSINOPHIL NFR BLD AUTO: 3 % (ref 0–6)
ERYTHROCYTE [DISTWIDTH] IN BLOOD BY AUTOMATED COUNT: 14 % (ref 11.6–15.1)
EST. AVERAGE GLUCOSE BLD GHB EST-MCNC: 114 MG/DL
GFR SERPL CREATININE-BSD FRML MDRD: 77 ML/MIN/1.73SQ M
GLUCOSE P FAST SERPL-MCNC: 90 MG/DL (ref 65–99)
HBA1C MFR BLD: 5.6 %
HCT VFR BLD AUTO: 49.7 % (ref 36.5–49.3)
HDLC SERPL-MCNC: 39 MG/DL
HGB BLD-MCNC: 16.3 G/DL (ref 12–17)
IMM GRANULOCYTES # BLD AUTO: 0.08 THOUSAND/UL (ref 0–0.2)
IMM GRANULOCYTES NFR BLD AUTO: 1 % (ref 0–2)
LDLC SERPL DIRECT ASSAY-MCNC: 113 MG/DL (ref 0–100)
LYMPHOCYTES # BLD AUTO: 2.24 THOUSANDS/ÂΜL (ref 0.6–4.47)
LYMPHOCYTES NFR BLD AUTO: 34 % (ref 14–44)
MCH RBC QN AUTO: 32 PG (ref 26.8–34.3)
MCHC RBC AUTO-ENTMCNC: 32.8 G/DL (ref 31.4–37.4)
MCV RBC AUTO: 98 FL (ref 82–98)
MONOCYTES # BLD AUTO: 0.58 THOUSAND/ÂΜL (ref 0.17–1.22)
MONOCYTES NFR BLD AUTO: 9 % (ref 4–12)
NEUTROPHILS # BLD AUTO: 3.4 THOUSANDS/ÂΜL (ref 1.85–7.62)
NEUTS SEG NFR BLD AUTO: 52 % (ref 43–75)
NONHDLC SERPL-MCNC: 177 MG/DL
NRBC BLD AUTO-RTO: 0 /100 WBCS
PLATELET # BLD AUTO: 243 THOUSANDS/UL (ref 149–390)
PMV BLD AUTO: 9.7 FL (ref 8.9–12.7)
POTASSIUM SERPL-SCNC: 4 MMOL/L (ref 3.5–5.3)
PROT SERPL-MCNC: 7.1 G/DL (ref 6.4–8.4)
RBC # BLD AUTO: 5.1 MILLION/UL (ref 3.88–5.62)
SODIUM SERPL-SCNC: 138 MMOL/L (ref 135–147)
TESTOST SERPL-MCNC: 288 NG/DL (ref 95–948)
TRIGL SERPL-MCNC: 522 MG/DL
TSH SERPL DL<=0.05 MIU/L-ACNC: 4.29 UIU/ML (ref 0.45–4.5)
WBC # BLD AUTO: 6.56 THOUSAND/UL (ref 4.31–10.16)

## 2023-02-08 NOTE — RESULT ENCOUNTER NOTE
Unstable labs - will review with patient at upcoming appointment  Hypertriglyceridemia - Uncontrolled  Start Fenofibrate 160mg QD

## 2023-02-09 ENCOUNTER — OFFICE VISIT (OUTPATIENT)
Dept: FAMILY MEDICINE CLINIC | Facility: CLINIC | Age: 63
End: 2023-02-09

## 2023-02-09 VITALS
DIASTOLIC BLOOD PRESSURE: 78 MMHG | HEIGHT: 67 IN | SYSTOLIC BLOOD PRESSURE: 118 MMHG | OXYGEN SATURATION: 98 % | HEART RATE: 110 BPM | WEIGHT: 248 LBS | BODY MASS INDEX: 38.92 KG/M2 | RESPIRATION RATE: 19 BRPM | TEMPERATURE: 98 F

## 2023-02-09 DIAGNOSIS — R73.01 IFG (IMPAIRED FASTING GLUCOSE): ICD-10-CM

## 2023-02-09 DIAGNOSIS — F31.81 BIPOLAR 2 DISORDER (HCC): ICD-10-CM

## 2023-02-09 DIAGNOSIS — F41.1 GENERALIZED ANXIETY DISORDER: ICD-10-CM

## 2023-02-09 DIAGNOSIS — J45.909 ASTHMA DUE TO SEASONAL ALLERGIES: ICD-10-CM

## 2023-02-09 DIAGNOSIS — Z86.010 HISTORY OF COLON POLYPS: ICD-10-CM

## 2023-02-09 DIAGNOSIS — E78.1 HYPERTRIGLYCERIDEMIA: ICD-10-CM

## 2023-02-09 DIAGNOSIS — E78.2 MIXED HYPERLIPIDEMIA: ICD-10-CM

## 2023-02-09 DIAGNOSIS — Z00.00 ANNUAL PHYSICAL EXAM: Primary | ICD-10-CM

## 2023-02-09 DIAGNOSIS — I10 BENIGN ESSENTIAL HYPERTENSION: ICD-10-CM

## 2023-02-09 DIAGNOSIS — Z99.89 OSA ON CPAP: ICD-10-CM

## 2023-02-09 DIAGNOSIS — E29.1 HYPOGONADISM IN MALE: ICD-10-CM

## 2023-02-09 DIAGNOSIS — G47.33 OSA ON CPAP: ICD-10-CM

## 2023-02-09 DIAGNOSIS — E66.01 SEVERE OBESITY (BMI 35.0-39.9) WITH COMORBIDITY (HCC): ICD-10-CM

## 2023-02-09 DIAGNOSIS — E66.9 OBESITY (BMI 35.0-39.9 WITHOUT COMORBIDITY): ICD-10-CM

## 2023-02-09 DIAGNOSIS — E66.01 CLASS 2 SEVERE OBESITY WITH SERIOUS COMORBIDITY AND BODY MASS INDEX (BMI) OF 38.0 TO 38.9 IN ADULT, UNSPECIFIED OBESITY TYPE (HCC): ICD-10-CM

## 2023-02-09 DIAGNOSIS — Z23 IMMUNIZATION DUE: ICD-10-CM

## 2023-02-09 RX ORDER — FENOFIBRATE 160 MG/1
160 TABLET ORAL DAILY
Qty: 30 TABLET | Refills: 1 | Status: SHIPPED | OUTPATIENT
Start: 2023-02-09

## 2023-02-09 RX ORDER — QUETIAPINE FUMARATE 25 MG/1
25 TABLET, FILM COATED ORAL
Qty: 90 TABLET | Refills: 2 | Status: SHIPPED | OUTPATIENT
Start: 2023-02-09

## 2023-02-09 NOTE — PATIENT INSTRUCTIONS
You are eligible to receive COVID Bivalent booster 2 months after your last COVID-vaccine, or 90 days after your last COVID infection  Wellness Visit for Adults   AMBULATORY CARE:   A wellness visit  is when you see your healthcare provider to get screened for health problems  Your healthcare provider will also give you advice on how to stay healthy  Write down your questions so you remember to ask them  Ask your healthcare provider how often you should have a wellness visit  What happens at a wellness visit:  Your healthcare provider will ask about your health, and your family history of health problems  This includes high blood pressure, heart disease, and cancer  He or she will ask if you have symptoms that concern you, if you smoke, and about your mood  You may also be asked about your intake of medicines, supplements, food, and alcohol  Any of the following may be done: Your weight  will be checked  Your height may also be checked so your body mass index (BMI) can be calculated  Your BMI shows if you are at a healthy weight  Your blood pressure  and heart rate will be checked  Your temperature may also be checked  Blood and urine tests  may be done  Blood tests may be done to check your cholesterol levels  Abnormal cholesterol levels increase your risk for heart disease and stroke  You may also need a blood or urine test to check for diabetes if you are at increased risk  Urine tests may be done to look for signs of an infection or kidney disease  A physical exam  includes checking your heartbeat and lungs with a stethoscope  Your healthcare provider may also check your skin to look for sun damage  Screening tests  may be recommended  A screening test is done to check for diseases that may not cause symptoms  The screening tests you may need depend on your age, gender, family history, and lifestyle habits   For example, colorectal screening may be recommended if you are 48years old or older     Screening tests you need if you are a woman:   A Pap smear  is used to screen for cervical cancer  Pap smears are usually done every 3 to 5 years depending on your age  You may need them more often if you have had abnormal Pap smear test results in the past  Ask your healthcare provider how often you should have a Pap smear  A mammogram  is an x-ray of your breasts to screen for breast cancer  Experts recommend mammograms every 2 years starting at age 48 years  You may need a mammogram at age 52 years or younger if you have an increased risk for breast cancer  Talk to your healthcare provider about when you should start having mammograms and how often you need them  Vaccines you may need:   Get an influenza vaccine  every year  The influenza vaccine protects you from the flu  Several types of viruses cause the flu  The viruses change over time, so new vaccines are made each year  Get a tetanus-diphtheria (Td) booster vaccine  every 10 years  This vaccine protects you against tetanus and diphtheria  Tetanus is a severe infection that may cause painful muscle spasms and lockjaw  Diphtheria is a severe bacterial infection that causes a thick covering in the back of your mouth and throat  Get a human papillomavirus (HPV) vaccine  if you are female and aged 23 to 32 or male 23 to 24 and never received it  This vaccine protects you from HPV infection  HPV is the most common infection spread by sexual contact  HPV may also cause vaginal, penile, and anal cancers  Get a pneumococcal vaccine  if you are aged 72 years or older  The pneumococcal vaccine is an injection given to protect you from pneumococcal disease  Pneumococcal disease is an infection caused by pneumococcal bacteria  The infection may cause pneumonia, meningitis, or an ear infection  Get a shingles vaccine  if you are 60 or older, even if you have had shingles before   The shingles vaccine is an injection to protect you from the varicella-zoster virus  This is the same virus that causes chickenpox  Shingles is a painful rash that develops in people who had chickenpox or have been exposed to the virus  How to eat healthy:  My Plate is a model for planning healthy meals  It shows the types and amounts of foods that should go on your plate  Fruits and vegetables make up about half of your plate, and grains and protein make up the other half  A serving of dairy is included on the side of your plate  The amount of calories and serving sizes you need depends on your age, gender, weight, and height  Examples of healthy foods are listed below:  Eat a variety of vegetables  such as dark green, red, and orange vegetables  You can also include canned vegetables low in sodium (salt) and frozen vegetables without added butter or sauces  Eat a variety of fresh fruits , canned fruit in 100% juice, frozen fruit, and dried fruit  Include whole grains  At least half of the grains you eat should be whole grains  Examples include whole-wheat bread, wheat pasta, brown rice, and whole-grain cereals such as oatmeal     Eat a variety of protein foods such as seafood (fish and shellfish), lean meat, and poultry without skin (turkey and chicken)  Examples of lean meats include pork leg, shoulder, or tenderloin, and beef round, sirloin, tenderloin, and extra lean ground beef  Other protein foods include eggs and egg substitutes, beans, peas, soy products, nuts, and seeds  Choose low-fat dairy products such as skim or 1% milk or low-fat yogurt, cheese, and cottage cheese  Limit unhealthy fats  such as butter, hard margarine, and shortening  Exercise:  Exercise at least 30 minutes per day on most days of the week  Some examples of exercise include walking, biking, dancing, and swimming  You can also fit in more physical activity by taking the stairs instead of the elevator or parking farther away from stores   Include muscle strengthening activities 2 days each week  Regular exercise provides many health benefits  It helps you manage your weight, and decreases your risk for type 2 diabetes, heart disease, stroke, and high blood pressure  Exercise can also help improve your mood  Ask your healthcare provider about the best exercise plan for you  General health and safety guidelines:   Do not smoke  Nicotine and other chemicals in cigarettes and cigars can cause lung damage  Ask your healthcare provider for information if you currently smoke and need help to quit  E-cigarettes or smokeless tobacco still contain nicotine  Talk to your healthcare provider before you use these products  Limit alcohol  A drink of alcohol is 12 ounces of beer, 5 ounces of wine, or 1½ ounces of liquor  Lose weight, if needed  Being overweight increases your risk of certain health conditions  These include heart disease, high blood pressure, type 2 diabetes, and certain types of cancer  Protect your skin  Do not sunbathe or use tanning beds  Use sunscreen with a SPF 15 or higher  Apply sunscreen at least 15 minutes before you go outside  Reapply sunscreen every 2 hours  Wear protective clothing, hats, and sunglasses when you are outside  Drive safely  Always wear your seatbelt  Make sure everyone in your car wears a seatbelt  A seatbelt can save your life if you are in an accident  Do not use your cell phone when you are driving  This could distract you and cause an accident  Pull over if you need to make a call or send a text message  Practice safe sex  Use latex condoms if are sexually active and have more than one partner  Your healthcare provider may recommend screening tests for sexually transmitted infections (STIs)  Wear helmets, lifejackets, and protective gear  Always wear a helmet when you ride a bike or motorcycle, go skiing, or play sports that could cause a head injury  Wear protective equipment when you play sports   Wear a madina when you are on a boat or doing water sports  © Copyright [employer] 2022 Information is for End User's use only and may not be sold, redistributed or otherwise used for commercial purposes  All illustrations and images included in CareNotes® are the copyrighted property of [employer] M [employer]  or [employer]  The above information is an  only  It is not intended as medical advice for individual conditions or treatments  Talk to your doctor, nurse or pharmacist before following any medical regimen to see if it is safe and effective for you  Obesity   AMBULATORY CARE:   Obesity  means your body mass index (BMI) is greater than 30  Your healthcare provider will use your height and weight to measure your BMI  The risks of obesity include  many health problems, including injuries or physical disability  Diabetes (high blood sugar level)    High blood pressure or high cholesterol    Heart disease    Stroke    Gallbladder or liver disease    Cancer of the colon, breast, prostate, liver, or kidney    Sleep apnea    Arthritis or gout    Screening  is done to check for health conditions before you have signs or symptoms  If you are 28to 79years old, your blood sugar level may be checked every 3 years for signs of prediabetes or diabetes  Your healthcare provider will check your blood pressure at each visit  High blood pressure can lead to a stroke or other problems  Your provider may check for signs of heart disease, cancer, or other health problems  Seek care immediately if:   You have a severe headache, confusion, or difficulty speaking  You have weakness on one side of your body  You have chest pain, sweating, or shortness of breath  Call your doctor if:   You have symptoms of gallbladder or liver disease, such as pain in your upper abdomen  You have knee or hip pain and discomfort while walking      You have symptoms of diabetes, such as intense hunger and thirst, and frequent urination  You have symptoms of sleep apnea, such as snoring or daytime sleepiness  You have questions or concerns about your condition or care  Treatment for obesity  focuses on helping you lose weight to improve your health  Even a small decrease in BMI can reduce the risk for many health problems  Your healthcare provider will help you set a weight-loss goal   Lifestyle changes  are the first step in treating obesity  These include making healthy food choices and getting regular physical activity  Your healthcare provider may suggest a weight-loss program that involves coaching, education, and therapy  Medicine  may help you lose weight when it is used with a healthy foods and physical activity  Surgery  can help you lose weight if you are very obese and have other health problems  There are several types of weight-loss surgery  Ask your healthcare provider for more information  Tips for safe weight loss:   Set small, realistic goals  An example of a small goal is to walk for 20 minutes 5 days a week  Anther goal is to lose 5% of your body weight  Tell friends, family members, and coworkers about your goals  and ask for their support  Ask a friend to lose weight with you, or join a weight-loss support group  Identify foods or triggers that may cause you to overeat , and find ways to avoid them  Remove tempting high-calorie foods from your home and workplace  Place a bowl of fresh fruit on your kitchen counter  If stress causes you to eat, then find other ways to cope with stress  A counselor or therapist may be able to help you  Keep a diary to track what you eat and drink  Also write down how many minutes of physical activity you do each day  Weigh yourself once a week and record it in your diary  Eating changes: You will need to eat 500 to 1,000 fewer calories each day than you currently eat to lose 1 to 2 pounds a week   The following changes will help you cut calories:  Eat smaller portions  Use small plates, no larger than 9 inches in diameter  Fill your plate half full of fruits and vegetables  Measure your food using measuring cups until you know what a serving size looks like  Eat 3 meals and 1 or 2 snacks each day  Plan your meals in advance  Mary Jo Terese and eat at home most of the time  Eat slowly  Do not skip meals  Skipping meals can lead to overeating later in the day  This can make it harder for you to lose weight  Talk with a dietitian to help you make a meal plan and schedule that is right for you  Eat fruits and vegetables at every meal   They are low in calories and high in fiber, which makes you feel full  Do not add butter, margarine, or cream sauce to vegetables  Use herbs to season steamed vegetables  Eat less fat and fewer fried foods  Eat more baked or grilled chicken and fish  These protein sources are lower in calories and fat than red meat  Limit fast food  Dress your salads with olive oil and vinegar instead of bottled dressing  Limit the amount of sugar you eat  Do not drink sugary beverages  Limit alcohol  Activity changes:  Physical activity is good for your body in many ways  It helps you burn calories and build strong muscles  It decreases stress and depression, and improves your mood  It can also help you sleep better  Talk to your healthcare provider before you begin an exercise program   Exercise for at least 30 minutes 5 days a week  Start slowly  Set aside time each day for physical activity that you enjoy and that is convenient for you  It is best to do both weight training and an activity that increases your heart rate, such as walking, bicycling, or swimming  Find ways to be more active  Do yard work and housecleaning  Walk up the stairs instead of using elevators  Spend your leisure time going to events that require walking, such as outdoor festivals or fairs   This extra physical activity can help you lose weight and keep it off  Follow up with your doctor as directed: You may need to meet with a dietitian  Write down your questions so you remember to ask them during your visits  © Copyright [employer] 2022 Information is for End User's use only and may not be sold, redistributed or otherwise used for commercial purposes  All illustrations and images included in CareNotes® are the copyrighted property of A [employer] A M , Inc  or [employer]   The above information is an  only  It is not intended as medical advice for individual conditions or treatments  Talk to your doctor, nurse or pharmacist before following any medical regimen to see if it is safe and effective for you  Cholesterol and Your Health   AMBULATORY CARE:   Cholesterol  is a waxy, fat-like substance  Your body uses cholesterol to make hormones and new cells, and to protect nerves  Cholesterol is made by your body  It also comes from certain foods you eat, such as meat and dairy products  Your healthcare provider can help you set goals for your cholesterol levels  He or she can help you create a plan to meet your goals  Cholesterol level goals: Your cholesterol level goals depend on your risk for heart disease, your age, and your other health conditions  The following are general guidelines: Total cholesterol  includes low-density lipoprotein (LDL), high-density lipoprotein (HDL), and triglyceride levels  The total cholesterol level should be lower than 200 mg/dL and is best at about 150 mg/dL  LDL cholesterol  is called bad cholesterol  because it forms plaque in your arteries  As plaque builds up, your arteries become narrow, and less blood flows through  When plaque decreases blood flow to your heart, you may have chest pain  If plaque completely blocks an artery that brings blood to your heart, you may have a heart attack  Plaque can break off and form blood clots   Blood clots may block arteries in your brain and cause a stroke  The level should be less than 130 mg/dL and is best at about 100 mg/dL  HDL cholesterol  is called good cholesterol  because it helps remove LDL cholesterol from your arteries  It does this by attaching to LDL cholesterol and carrying it to your liver  Your liver breaks down LDL cholesterol so your body can get rid of it  High levels of HDL cholesterol can help prevent a heart attack and stroke  Low levels of HDL cholesterol can increase your risk for heart disease, heart attack, and stroke  The level should be 60 mg/dL or higher  Triglycerides  are a type of fat that store energy from foods you eat  High levels of triglycerides also cause plaque buildup  This can increase your risk for a heart attack or stroke  If your triglyceride level is high, your LDL cholesterol level may also be high  The level should be less than 150 mg/dL  Any of the following can increase your risk for high cholesterol:   Smoking cigarettes    Being overweight or obese, or not getting enough exercise    Drinking large amounts of alcohol    A medical condition such as hypertension (high blood pressure) or diabetes    Certain genes passed from your parents to you    Age older than 65 years    What you need to know about having your cholesterol levels checked: Adults [address] of age should have their cholesterol levels checked every 4 to 6 years  Adults 45 years or older should have their cholesterol checked every 1 to 2 years  You may need your cholesterol checked more often, or at a younger age, if you have risk factors for heart disease  You may also need to have your cholesterol checked more often if you have other health conditions, such as diabetes  Blood tests are used to check cholesterol levels  Blood tests measure your levels of triglycerides, LDL cholesterol, and HDL cholesterol    How healthy fats affect your cholesterol levels:  Healthy fats, also called unsaturated fats, help lower LDL cholesterol and triglyceride levels  Healthy fats include the following:  Monounsaturated fats  are found in foods such as olive oil, canola oil, avocado, nuts, and olives  Polyunsaturated fats,  such as omega 3 fats, are found in fish, such as salmon, trout, and tuna  They can also be found in plant foods such as flaxseed, walnuts, and soybeans  How unhealthy fats affect your cholesterol levels:  Unhealthy fats increase LDL cholesterol and triglyceride levels  They are found in foods high in cholesterol, saturated fat, and trans fat:  Cholesterol  is found in eggs, dairy, and meat  Saturated fat  is found in butter, cheese, ice cream, whole milk, and coconut oil  Saturated fat is also found in meat, such as sausage, hot dogs, and bologna  Trans fat  is found in liquid oils and is used in fried and baked foods  Foods that contain trans fats include chips, crackers, muffins, sweet rolls, microwave popcorn, and cookies  Treatment  for high cholesterol will also decrease your risk of heart disease, heart attack, and stroke  Treatment may include any of the following:  Lifestyle changes  may include food, exercise, weight loss, and quitting smoking  You may also need to decrease the amount of alcohol you drink  Your healthcare provider will want you to start with lifestyle changes  Other treatment may be added if lifestyle changes are not enough  Your healthcare provider may recommend you work with a team to manage hyperlipidemia  The team may include medical experts such as a dietitian, an exercise or physical therapist, and a behavior therapist  Your family members may be included in helping you create lifestyle changes  Medicines  may be given to lower your LDL cholesterol, triglyceride levels, or total cholesterol level  You may need medicines to lower your cholesterol if any of the following is true:    You have a history of stroke, TIA, unstable angina, or a heart attack      Your LDL cholesterol level is 190 mg/dL or higher  You are age 36 to 76 years, have diabetes or heart disease risk factors, and your LDL cholesterol is 70 mg/dL or higher  Supplements  include fish oil, red yeast rice, and garlic  Fish oil may help lower your triglyceride and LDL cholesterol levels  It may also increase your HDL cholesterol level  Red yeast rice may help decrease your total cholesterol level and LDL cholesterol level  Garlic may help lower your total cholesterol level  Do not take any supplements without talking to your healthcare provider  Food changes you can make to lower your cholesterol levels:  A dietitian can help you create a healthy eating plan  He or she can show you how to read food labels and choose foods low in saturated fat, trans fats, and cholesterol  Decrease the total amount of fat you eat  Choose lean meats, fat-free or 1% fat milk, and low-fat dairy products, such as yogurt and cheese  Try to limit or avoid red meats  Limit or do not eat fried foods or baked goods, such as cookies  Replace unhealthy fats with healthy fats  Cook foods in olive oil or canola oil  Choose soft margarines that are low in saturated fat and trans fat  Seeds, nuts, and avocados are other examples of healthy fats  Eat foods with omega-3 fats  Examples include salmon, tuna, mackerel, walnuts, and flaxseed  Eat fish 2 times per week  Pregnant women should not eat fish that have high levels of mercury, such as shark, swordfish, and tylor mackerel  Increase the amount of high-fiber foods you eat  High-fiber foods can help lower your LDL cholesterol  Aim to get between 20 and 30 grams of fiber each day  Fruits and vegetables are high in fiber  Eat at least 5 servings each day  Other high-fiber foods are whole-grain or whole-wheat breads, pastas, or cereals, and brown rice  Eat 3 ounces of whole-grain foods each day  Increase fiber slowly   You may have abdominal discomfort, bloating, and gas if you add fiber to your diet too quickly  Eat healthy protein foods  Examples include low-fat dairy products, skinless chicken and turkey, fish, and nuts  Limit foods and drinks that are high in sugar  Your dietitian or healthcare provider can help you create daily limits for high-sugar foods and drinks  The limit may be lower if you have diabetes or another health condition  Limits can also help you lose weight if needed  Lifestyle changes you can make to lower your cholesterol levels:   Maintain a healthy weight  Ask your healthcare provider what a healthy weight is for you  Ask him or her to help you create a weight loss plan if needed  Weight loss can decrease your total cholesterol and triglyceride levels  Weight loss may also help keep your blood pressure at a healthy level  Be physically active throughout the day  Physical activity, such as exercise, can help lower your total cholesterol level and maintain a healthy weight  Physical activity can also help increase your HDL cholesterol level  Work with your healthcare provider to create an program that is right for you  Get at least 30 to 40 minutes of moderate physical activity most days of the week  Examples of exercise include brisk walking, swimming, or biking  Also include strength training at least 2 times each week  Your healthcare providers can help you create a physical activity plan  Do not smoke  Nicotine and other chemicals in cigarettes and cigars can raise your cholesterol levels  Ask your healthcare provider for information if you currently smoke and need help to quit  E-cigarettes or smokeless tobacco still contain nicotine  Talk to your healthcare provider before you use these products  Limit or do not drink alcohol  Alcohol can increase your triglyceride levels  Ask your healthcare provider before you drink alcohol  Ask how much is okay for you to drink in 24 hours or 1 week      Follow up with your doctor as directed:  Write down your questions so you remember to ask them during your visits  © Copyright [employer] 2022 Information is for End User's use only and may not be sold, redistributed or otherwise used for commercial purposes  All illustrations and images included in CareNotes® are the copyrighted property of A [employer] M [employer]  or [employer]  The above information is an  only  It is not intended as medical advice for individual conditions or treatments  Talk to your doctor, nurse or pharmacist before following any medical regimen to see if it is safe and effective for you

## 2023-02-09 NOTE — PROGRESS NOTES
Assessment/Plan:  Problem List Items Addressed This Visit        Endocrine    IFG (impaired fasting glucose)     Stable  Insurance would not cover Metformin XR 500mg 3 pills QD or pre-DM education  Recommend lifestyle modifications  To consider short-acting Metformin in future PRN? Relevant Orders    Comprehensive metabolic panel    Hemoglobin A1C    Hypogonadism in male     Management per [facility]  Encouraged Androgel compliance  Respiratory    RUKHSANA on CPAP     Continue CPAP  Management per Sleep Medicine  Asthma due to seasonal allergies     Stable  Management per asthma/allergy-overdue for appointment  Cardiovascular and Mediastinum    Benign essential hypertension     Stable  Check blood pressure outside of office  Recommend lifestyle modifications  Relevant Orders    Comprehensive metabolic panel       Other    Mixed hyperlipidemia     Stable  Continue Lipitor 20 mg nightly  Recommend lifestyle modifications  Relevant Medications    fenofibrate 160 MG tablet    Other Relevant Orders    Comprehensive metabolic panel    Lipid panel    TSH, 3rd generation with Free T4 reflex    LDL cholesterol, direct    Obesity, unspecified     Improved  Recommend lifestyle modifications  Relevant Orders    TSH, 3rd generation with Free T4 reflex    Generalized anxiety disorder     Continue Cymbalta 90mg daily, Seroquel 25mg QHS  Smart phone nathaniel list and counseling list provided previously  Relevant Medications    QUEtiapine (SEROquel) 25 mg tablet    Other Relevant Orders    TSH, 3rd generation with Free T4 reflex    Bipolar 2 disorder (HCC)     Continue Cymbalta 90mg daily, Seroquel 25mg QHS  Smart phone nathaniel list and counseling list provided previously  Relevant Medications    QUEtiapine (SEROquel) 25 mg tablet    Other Relevant Orders    TSH, 3rd generation with Free T4 reflex    Hypertriglyceridemia     Uncontrolled    Start Fenofibrate 160 mg daily  Recommend lifestyle modifications  Relevant Medications    fenofibrate 160 MG tablet    Other Relevant Orders    Comprehensive metabolic panel    Triglycerides    Lipid panel    TSH, 3rd generation with Free T4 reflex    LDL cholesterol, direct    History of colon polyps     Colonoscopy is up to date  Other Visit Diagnoses     Annual physical exam    -  Primary    Immunization due        Relevant Orders    Pneumococcal Conjugate Vaccine 20-valent (Pcv20) (Completed)    Obesity (BMI 35 0-39 9 without comorbidity)        Severe obesity (BMI 35 0-39  9) with comorbidity ([facility] [address] )        BMI 38 0-38 9,adult               Return in about 4 months (around 6/9/2023) for 4mo - IFG, HTN, HL, Anx, BPD, Low T, RUKHSANA, M Obesity, Labs  Future Appointments   Date Time Provider Oh Clement   2/20/2023  1:15 PM Jana [id number], [facility]   6/14/2023  1:00 PM Shawnee Ayala,  [facility]        Subjective:     Humble Bedolla is a 66 y o  male who presents today for a follow-up on his chronic medical conditions  HPI:  Chief Complaint   Patient presents with   • Annual Exam     Pt reports no concerns  No additional COVID injections to add       -- Above per clinical staff and reviewed  --      HPI      Today:      Return in about 4 months (around 2/6/2023) for Physical / 4mo - IFG, HTN, HL, Anx, BPD, Low T, RUKHSANA, M Obesity, Labs      4mo OV       Retired late 9/21        Obesity - [employer] - avoiding salt in food, but eating increased portions of healthy good   No Regular exercise - Previously Swimming for 30 minutes, 4 days per week; previously Walking with wife 45 minutes, 4 times per week; bike riding 35 minutes, 2 times per week         IFG - Patient never started Metformin XR 500mg 3 pills QD as it was not covered by his insurance   S/p Pre-DM education - last attended 3/9/22, insurance would no longer cover           HTN - On Lisinopril 30mg QD   No higher dose or other HTN Rx previously   No BP check outside of office  Hernan Lr has an arm BP cuff at home   Stable Echo 10/10/19        Hyperlipidemia - On Lipitor 20mg QHS   No higher doses   Unsure of other statin name that he had tried previously - ineffective   s/p clean cardiac cath 10/4/19        RUKHSANA on CPAP - Uses CPAP regularly   Management per Sleep Specialist on [address] - Dr Janet Mike   Next appt    Last sleep study ?, no weight changes in the interim        Bipolar Disorder - On Seroquel 50mg QHS - only taking 1/2 pill as felt too tired on full pill after taking for 2-3 weeks  D/C Rexulti 2mg QHS due to fatigue  Feels 90% improved  Has more energy, no longer napping, not irritable, improved motivation  Dieterichmargarita Escobar He previously requested to decrease his Cymbalta dose from 90mg QD to 60mg QD 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, but noticed worsening mood and Cymbalta 90mg QD was resumed 22  D/C Lamictal due to hives   Good social supports   Last counseling  - helpful   No SI/HI/AH/VH  He is on waiting list for individual, but not couple's, counseling as of 10/6/22        Anxiety - On Seroquel 50mg QHS - only taking 1/2 pill as felt too tired on full pill after taking for 2-3 weeks  D/C Rexulti 2mg QHS due to fatigue    On increased Cymbalta 90mg QD   He no longer feels drained by his anxiety due to increased work stressors, and is able to cope better   Feels 90% improved   Previously on Zoloft - ineffective        PHQ-2/9 Depression Screening    Little interest or pleasure in doing things: 0 - not at all  Feeling down, depressed, or hopeless: 0 - not at all  Trouble falling or staying asleep, or sleeping too much: 0 - not at all  Feeling tired or having little energy: 0 - not at all  Poor appetite or overeatin - not at all  Feeling bad about yourself - or that you are a failure or have let yourself or your family down: 0 - not at all  Trouble concentrating on things, such as reading the newspaper or watching television: 0 - not at all  Moving or speaking so slowly that other people could have noticed  Or the opposite - being so fidgety or restless that you have been moving around a lot more than usual: 0 - not at all  Thoughts that you would be better off dead, or of hurting yourself in some way: 0 - not at all  PHQ-2 Score: 0  PHQ-2 Interpretation: Negative depression screen  PHQ-9 Score: 0   PHQ-9 Interpretation: No or Minimal depression          VENANCIO-7 Flowsheet Screening    Flowsheet Row Most Recent Value   Over the last 2 weeks, how often have you been bothered by any of the following problems? Feeling nervous, anxious, or on edge 0   Not being able to stop or control worrying 0   Worrying too much about different things 0   Trouble relaxing 0   Being so restless that it is hard to sit still 0   Becoming easily annoyed or irritable 0   Feeling afraid as if something awful might happen 0   VENANCIO-7 Total Score 0         MDQ:  5, Synchronous, No Problem        Low Testosterone - x 2 6/29/20 and 11/25/20   Management per [facility] HOM[facility] Collado-[facility]   Next appt 2/23   On Androgel since 5/30/22 - using QOD AMA due to running low on Rx - pt will call [facility] for refill   Low libido, + ED   No improvement in mood or fatigue since Androgel started 5/30/22         Asthma - Triggered by allergies   Uses Albuterol HFA PRN and Breo 100-25 1 puff QD PRN   Last use 3/20   No ICS previously  Tiffanie craig [id number] per Asthma / Claude Brodie for appointment   ACT 25 on 8/31/21          Soledad's Syndrome - x 2   S/p Antibiotics   Never followed c Rheum        H/O Colon Polyps -Marilyn Medina - next colonoscopy 12/10/2023        Reviewed:  Labs 2/7/23, [facility] 8/18/22    Sees [facility]  Overdue for Dentist   Vera Lisa q2 years            The following portions of the patient's history were reviewed and updated as appropriate: allergies, current medications, past family history, past medical history, past social history, past surgical history and problem list       Review of Systems   Constitutional: Negative for appetite change, chills, diaphoresis, fatigue and fever  Respiratory: Negative for chest tightness and shortness of breath  Cardiovascular: Negative for chest pain  Gastrointestinal: Negative for abdominal pain, blood in stool, diarrhea, nausea and vomiting  Genitourinary: Negative for dysuria  Current Outpatient Medications   Medication Sig Dispense Refill   • atorvastatin (LIPITOR) 20 mg tablet Take 1 tablet (20 mg total) by mouth daily at bedtime 90 tablet 2   • cholecalciferol (VITAMIN D3) 1,000 units tablet Take 1,000 Units by mouth daily     • Cyanocobalamin (B-12) 1000 MCG TABS Take 1 tablet by mouth in the morning     • DULoxetine (CYMBALTA) 30 mg delayed release capsule Take 1 capsule (30 mg total) by mouth daily Take with 60mg pill for a total of 90mg daily  90 capsule 2   • DULoxetine (CYMBALTA) 60 mg delayed release capsule Take 1 capsule (60 mg total) by mouth daily Take with 30mg pill for a total of 90mg daily  90 capsule 2   • fenofibrate 160 MG tablet Take 1 tablet (160 mg total) by mouth daily 30 tablet 1   • lisinopril (ZESTRIL) 30 mg tablet Take 1 tablet (30 mg total) by mouth daily 90 tablet 2   • Magnesium 400 MG TABS Take 1 tablet by mouth in the morning     • QUEtiapine (SEROquel) 25 mg tablet Take 1 tablet (25 mg total) by mouth daily at bedtime 90 tablet 2   • testosterone (ANDROGEL) 1% Apply 1 packet (50 mg total) topically daily (Patient taking differently: Apply 50 mg topically every other day) 90 packet 1     No current facility-administered medications for this visit         Objective:  /78 (BP Location: Left arm, Patient Position: Sitting, Cuff Size: Large)   Pulse (!) 110   Temp 98 °F (36 7 °C)   Resp 19   Ht 5' 7 25" (1 708 m)   Wt 112 kg (248 lb)   SpO2 98%   BMI 38 55 kg/m²    Wt Readings from Last 3 Encounters: 02/09/23 112 kg (248 lb)   10/06/22 115 kg (253 lb)   09/07/22 115 kg (252 lb 12 8 oz)      BP Readings from Last 3 Encounters:   02/09/23 118/78   10/06/22 138/82   09/07/22 128/76          Physical Exam  Vitals and nursing note reviewed  Constitutional:       Appearance: Normal appearance  He is well-developed  He is obese  HENT:      Head: Normocephalic and atraumatic  Right Ear: External ear normal  There is impacted cerumen  Left Ear: Tympanic membrane, ear canal and external ear normal       Nose: Nose normal       Right Sinus: No maxillary sinus tenderness or frontal sinus tenderness  Left Sinus: No maxillary sinus tenderness or frontal sinus tenderness  Mouth/Throat:      Mouth: Mucous membranes are moist       Pharynx: Oropharynx is clear  Uvula midline  Tonsils: No tonsillar exudate  Eyes:      Extraocular Movements: Extraocular movements intact  Conjunctiva/sclera: Conjunctivae normal       Pupils: Pupils are equal, round, and reactive to light  Neck:      Vascular: No carotid bruit  Cardiovascular:      Rate and Rhythm: Normal rate and regular rhythm  Pulses: Normal pulses  Heart sounds: Normal heart sounds  Pulmonary:      Effort: Pulmonary effort is normal       Breath sounds: Normal breath sounds  Abdominal:      General: Bowel sounds are normal  There is no distension  Palpations: Abdomen is soft  There is no mass  Tenderness: There is no abdominal tenderness  There is no guarding or rebound  Musculoskeletal:         General: No swelling or tenderness  Cervical back: Neck supple  Right lower leg: No edema  Left lower leg: No edema  Lymphadenopathy:      Cervical: No cervical adenopathy  Skin:     Findings: No rash  Neurological:      General: No focal deficit present  Mental Status: He is alert and oriented to person, place, and time     Psychiatric:         Mood and Affect: Mood normal          Behavior: Behavior normal          Thought Content: Thought content normal          Judgment: Judgment normal          Lab Results:      Lab Results   Component Value Date    WBC 6 56 02/07/2023    HGB 16 3 02/07/2023    HCT 49 7 (H) 02/07/2023     02/07/2023    TRIG 522 (H) 02/07/2023    HDL 39 (L) 02/07/2023    LDLDIRECT 113 (H) 02/07/2023    ALT 24 02/07/2023    AST 16 02/07/2023    K 4 0 02/07/2023     02/07/2023    CREATININE 1 04 02/07/2023    BUN 21 02/07/2023    CO2 27 02/07/2023    PSA 1 3 08/17/2022    INR 1 0 10/01/2019    GLUF 90 02/07/2023    HGBA1C 5 6 02/07/2023     No results found for: URICACID  Invalid input(s): BASENAME Vitamin D    No results found  POCT Labs      BMI Counseling: Body mass index is 38 55 kg/m²  The BMI is above normal  Nutrition recommendations include encouraging healthy choices of fruits and vegetables  Exercise recommendations include exercising 3-5 times per week  No pharmacotherapy was ordered  Rationale for BMI follow-up plan is due to patient being overweight or obese

## 2023-02-09 NOTE — ASSESSMENT & PLAN NOTE
Stable  Insurance would not cover Metformin XR 500mg 3 pills QD or pre-DM education  Recommend lifestyle modifications  To consider short-acting Metformin in future PRN?

## 2023-02-09 NOTE — ASSESSMENT & PLAN NOTE
Continue Cymbalta 90mg daily, Seroquel 25mg QHS  Smart phone nathaniel list and counseling list provided previously

## 2023-02-10 ENCOUNTER — PATIENT MESSAGE (OUTPATIENT)
Dept: FAMILY MEDICINE CLINIC | Facility: CLINIC | Age: 63
End: 2023-02-10

## 2023-02-14 DIAGNOSIS — R79.89 LOW TESTOSTERONE: ICD-10-CM

## 2023-02-14 DIAGNOSIS — E29.1 TESTICULAR HYPOFUNCTION: ICD-10-CM

## 2023-02-14 RX ORDER — TESTOSTERONE GEL, 1% 10 MG/G
50 GEL TRANSDERMAL DAILY
Qty: 90 PACKET | Refills: 1 | Status: SHIPPED | OUTPATIENT
Start: 2023-02-14

## 2023-02-20 ENCOUNTER — OFFICE VISIT (OUTPATIENT)
Dept: UROLOGY | Facility: CLINIC | Age: 63
End: 2023-02-20

## 2023-02-20 VITALS
OXYGEN SATURATION: 96 % | HEIGHT: 67 IN | BODY MASS INDEX: 38.92 KG/M2 | DIASTOLIC BLOOD PRESSURE: 74 MMHG | SYSTOLIC BLOOD PRESSURE: 122 MMHG | HEART RATE: 105 BPM | WEIGHT: 248 LBS

## 2023-02-20 DIAGNOSIS — E29.1 HYPOGONADISM IN MALE: Primary | ICD-10-CM

## 2023-02-20 DIAGNOSIS — Z12.5 SCREENING FOR PROSTATE CANCER: ICD-10-CM
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
## 2023-03-28 ENCOUNTER — VBI (OUTPATIENT)
Dept: ADMINISTRATIVE | Facility: OTHER | Age: 63
End: 2023-03-28

## 2023-03-28 NOTE — TELEPHONE ENCOUNTER
03/28/23 11:25 AM     VB [employer] SmartForm used to document caregap status      [facility]

## 2023-04-05 DIAGNOSIS — E78.1 HYPERTRIGLYCERIDEMIA: ICD-10-CM

## 2023-04-05 RX ORDER — FENOFIBRATE 160 MG/1
TABLET ORAL
Qty: 30 TABLET | Refills: 1 | Status: SHIPPED | OUTPATIENT
Start: 2023-04-05

## 2023-05-30 ENCOUNTER — TELEPHONE (OUTPATIENT)
Dept: FAMILY MEDICINE CLINIC | Facility: CLINIC | Age: 63
End: 2023-05-30

## 2023-05-30 NOTE — TELEPHONE ENCOUNTER
Patient's wife called, he is having a bad asthma flare up and is out of his Albuterol for his nebulizer and also the rescue Albuterol inhaler or either or his asthma is just really bad  The allergist cannot see him until the end of June and they will not refill the medication but told him to call his PCP, would you please send in a refill for his Albuterol for his nebulizer machine and a rescue inhaler until he can get into the doctor as his asthma is really, really bad  Patient did schedule an appointment to see you for tomorrow, Wednesday, 5/31/23 for his asthma   Please advise

## 2023-05-30 NOTE — TELEPHONE ENCOUNTER
Patient states that he is coughing and SOB  and congestions states this started 2 weeks ago  Patient went to his mother in laws and got albuterol for his nebulizer and an rescue inhaler  Patient has taken 2 COVID swabs both negative  Patient states that he feels so much better now that he is on this medication  Fever last week, thinks he had a cold  No current trouble breathing  No chest pain  No trouble breathing  Just a cough

## 2023-05-31 ENCOUNTER — OFFICE VISIT (OUTPATIENT)
Dept: FAMILY MEDICINE CLINIC | Facility: CLINIC | Age: 63
End: 2023-05-31

## 2023-05-31 VITALS
HEIGHT: 67 IN | RESPIRATION RATE: 18 BRPM | TEMPERATURE: 98.1 F | HEART RATE: 106 BPM | BODY MASS INDEX: 38.92 KG/M2 | DIASTOLIC BLOOD PRESSURE: 74 MMHG | SYSTOLIC BLOOD PRESSURE: 132 MMHG | OXYGEN SATURATION: 99 % | WEIGHT: 248 LBS

## 2023-05-31 DIAGNOSIS — I10 BENIGN ESSENTIAL HYPERTENSION: ICD-10-CM

## 2023-05-31 DIAGNOSIS — E66.01 CLASS 2 SEVERE OBESITY WITH SERIOUS COMORBIDITY AND BODY MASS INDEX (BMI) OF 38.0 TO 38.9 IN ADULT, UNSPECIFIED OBESITY TYPE (HCC): ICD-10-CM

## 2023-05-31 DIAGNOSIS — J45.41 MODERATE PERSISTENT ASTHMA WITH EXACERBATION: Primary | ICD-10-CM

## 2023-05-31 DIAGNOSIS — J45.909 ASTHMA DUE TO SEASONAL ALLERGIES: ICD-10-CM

## 2023-05-31 DIAGNOSIS — G47.33 OSA ON CPAP: ICD-10-CM

## 2023-05-31 DIAGNOSIS — Z99.89 OSA ON CPAP: ICD-10-CM

## 2023-05-31 RX ORDER — MONTELUKAST SODIUM 10 MG/1
10 TABLET ORAL
Qty: 30 TABLET | Refills: 0 | Status: SHIPPED | OUTPATIENT
Start: 2023-05-31

## 2023-05-31 RX ORDER — ALBUTEROL SULFATE 90 UG/1
2 AEROSOL, METERED RESPIRATORY (INHALATION) EVERY 4 HOURS PRN
Qty: 18 G | Refills: 0 | Status: SHIPPED | OUTPATIENT
Start: 2023-05-31

## 2023-05-31 RX ORDER — FLUTICASONE PROPIONATE 44 UG/1
2 AEROSOL, METERED RESPIRATORY (INHALATION) 2 TIMES DAILY
Qty: 10.6 G | Refills: 0 | Status: SHIPPED | OUTPATIENT
Start: 2023-05-31

## 2023-05-31 NOTE — PROGRESS NOTES
Assessment/Plan:  Problem List Items Addressed This Visit        Respiratory    RUKHSANA on CPAP     Continue CPAP  Management per Sleep Medicine  Asthma due to seasonal allergies     Uncontrolled  Management per asthma/allergy-overdue for appointment  In the interim,  start singular 10 mg 1 pill at bedtime and Flovent 44 mcg 2 puffs twice daily  Relevant Medications    montelukast (SINGULAIR) 10 mg tablet    fluticasone (Flovent HFA) 44 mcg/act inhaler    albuterol (PROVENTIL HFA,VENTOLIN HFA) 90 mcg/act inhaler       Cardiovascular and Mediastinum    Benign essential hypertension     Borderline BP  Check blood pressure outside of office  Recommend lifestyle modifications  Other    Obesity, unspecified     Stable  Recommend lifestyle modifications  Other Visit Diagnoses     Moderate persistent asthma with exacerbation    -  Primary    Relevant Medications    montelukast (SINGULAIR) 10 mg tablet    fluticasone (Flovent HFA) 44 mcg/act inhaler    albuterol (PROVENTIL HFA,VENTOLIN HFA) 90 mcg/act inhaler    See above  Return if symptoms worsen or fail to improve  Future Appointments   Date Time Provider Oh Clement   6/14/2023  1:00 PM Spiek Wolf DO  [facility]   8/24/2023  1:30 PM Ananya Goins PA-C [facility]        Subjective:     Rose Suero is a 66 y o  male who presents today for a follow-up on his acute medical conditions  HPI:  Chief Complaint   Patient presents with   • Asthma     X2 weeks Cough      -- Above per clinical staff and reviewed  --    HPI      Today:    Cough - Symptoms x 2 weeks  Dry and productive cough, worse in AM and PM   Using MIL's Albuterol HFA x 2, Flonase, mother-in-law Albuterol neb x 2 yesterday - AMA c benefit  Has head congestion  Has appt c Allergist Dr Lisa Quinones late 6/23 as he will be seen as a new pt  Last used Breo when was last seen in 2018  ACT 13 on 5/31/23   Negative COVID test x 2 - 2 weeks ago  Denies COVID contacts in the past 14 days  Asthma - Triggered by allergies   Uses Albuterol HFA PRN and Breo 100-25 1 puff QD PRN   Last use 3/20   No ICS previously  Felizardo Grain rafa [id number] per Asthma / Allergist   Overdue for appointment   ACT 25 on 8/31/21  No other asthma meds previously  From previous note:    Asthma - Triggered by allergies   Uses Albuterol HFA PRN and Breo 100-25 1 puff QD PRN   Last use 3/20   No ICS previously  Felizardo Grain rafa 2019 per Asthma / Ines Dao for appointment   ACT 25 on 8/31/21       The following portions of the patient's history were reviewed and updated as appropriate: allergies, current medications, past family history, past medical history, past social history, past surgical history and problem list       Review of Systems   Constitutional: Negative for appetite change, chills, diaphoresis, fatigue and fever  Respiratory: Positive for cough, chest tightness, shortness of breath (With rest and movement) and wheezing (Throughout the day)  Cardiovascular: Negative for chest pain  Gastrointestinal: Negative for abdominal pain, blood in stool, diarrhea, nausea and vomiting  Genitourinary: Negative for dysuria  Current Outpatient Medications   Medication Sig Dispense Refill   • albuterol (PROVENTIL HFA,VENTOLIN HFA) 90 mcg/act inhaler Inhale 2 puffs every 4 (four) hours as needed for wheezing or shortness of breath 18 g 0   • atorvastatin (LIPITOR) 20 mg tablet Take 1 tablet (20 mg total) by mouth daily at bedtime 90 tablet 2   • cholecalciferol (VITAMIN D3) 1,000 units tablet Take 1,000 Units by mouth daily     • Cyanocobalamin (B-12) 1000 MCG TABS Take 1 tablet by mouth in the morning     • DULoxetine (CYMBALTA) 30 mg delayed release capsule Take 1 capsule (30 mg total) by mouth daily Take with 60mg pill for a total of 90mg daily   90 capsule 0   • DULoxetine (CYMBALTA) 60 mg delayed release capsule Take 1 capsule (60 "mg total) by mouth daily Take with 30mg pill for a total of 90mg daily  90 capsule 0   • fenofibrate 160 MG tablet Take 1 tablet (160 mg total) by mouth daily 90 tablet 1   • fluticasone (Flovent HFA) 44 mcg/act inhaler Inhale 2 puffs 2 (two) times a day Rinse mouth after use  10 6 g 0   • lisinopril (ZESTRIL) 30 mg tablet Take 1 tablet (30 mg total) by mouth daily 90 tablet 0   • Magnesium 400 MG TABS Take 1 tablet by mouth in the morning     • montelukast (SINGULAIR) 10 mg tablet Take 1 tablet (10 mg total) by mouth daily at bedtime 30 tablet 0   • QUEtiapine (SEROquel) 25 mg tablet Take 1 tablet (25 mg total) by mouth daily at bedtime 90 tablet 2   • testosterone (ANDROGEL) 1% Apply 1 packet (50 mg total) topically daily 90 packet 1     No current facility-administered medications for this visit  Objective:  /74   Pulse (!) 106   Temp 98 1 °F (36 7 °C)   Resp 18   Ht 5' 7\" (1 702 m)   Wt 112 kg (248 lb)   SpO2 99%   BMI 38 84 kg/m²    Wt Readings from Last 3 Encounters:   05/31/23 112 kg (248 lb)   02/20/23 112 kg (248 lb)   02/09/23 112 kg (248 lb)      BP Readings from Last 3 Encounters:   05/31/23 132/74   02/20/23 122/74   02/09/23 118/78          Physical Exam  Vitals and nursing note reviewed  Constitutional:       Appearance: Normal appearance  He is well-developed  He is obese  HENT:      Head: Normocephalic and atraumatic  Right Ear: External ear normal  There is impacted cerumen  Left Ear: External ear normal  There is impacted cerumen  Nose: Congestion (Left nare) present  Right Sinus: No maxillary sinus tenderness or frontal sinus tenderness  Left Sinus: No maxillary sinus tenderness or frontal sinus tenderness  Mouth/Throat:      Mouth: Mucous membranes are moist       Pharynx: Oropharynx is clear  Uvula midline  No oropharyngeal exudate or posterior oropharyngeal erythema  Tonsils: No tonsillar exudate     Eyes:      Extraocular Movements: " "Extraocular movements intact  Conjunctiva/sclera: Conjunctivae normal    Cardiovascular:      Rate and Rhythm: Normal rate and regular rhythm  Pulses: Normal pulses  Heart sounds: Normal heart sounds  Pulmonary:      Effort: Pulmonary effort is normal       Breath sounds: Normal breath sounds  Musculoskeletal:         General: No swelling or tenderness  Cervical back: Neck supple  Right lower leg: No edema  Left lower leg: No edema  Lymphadenopathy:      Cervical: No cervical adenopathy  Skin:     Findings: No rash  Neurological:      General: No focal deficit present  Mental Status: He is alert and oriented to person, place, and time  Psychiatric:         Mood and Affect: Mood normal          Lab Results:      Lab Results   Component Value Date    ALT 33 04/10/2023    AST 23 04/10/2023    BUN 18 04/10/2023     04/10/2023    CO2 30 04/10/2023    CREATININE 1 25 04/10/2023    GLUF 97 04/10/2023    HCT 49 7 (H) 02/07/2023    HDL 39 (L) 02/07/2023    HGB 16 3 02/07/2023    HGBA1C 5 6 02/07/2023    INR 1 0 10/01/2019    K 4 2 04/10/2023    LDLDIRECT 113 (H) 02/07/2023     02/07/2023    PSA 1 3 08/17/2022    TRIG 288 (H) 04/10/2023    WBC 6 56 02/07/2023     No results found for: \"URICACID\"  Invalid input(s): \"BASENAME\" Vitamin D    No results found       POCT Labs                       "

## 2023-06-01 NOTE — ASSESSMENT & PLAN NOTE
Uncontrolled  Management per asthma/allergy-overdue for appointment  In the interim,  start singular 10 mg 1 pill at bedtime and Flovent 44 mcg 2 puffs twice daily

## 2023-06-06 ENCOUNTER — VBI (OUTPATIENT)
Dept: ADMINISTRATIVE | Facility: OTHER | Age: 63
End: 2023-06-06

## 2023-06-27 ENCOUNTER — LAB (OUTPATIENT)
Dept: LAB | Facility: CLINIC | Age: 63
End: 2023-06-27
Payer: COMMERCIAL

## 2023-06-27 DIAGNOSIS — E66.01 CLASS 2 SEVERE OBESITY WITH SERIOUS COMORBIDITY AND BODY MASS INDEX (BMI) OF 38.0 TO 38.9 IN ADULT, UNSPECIFIED OBESITY TYPE (HCC): ICD-10-CM

## 2023-06-27 DIAGNOSIS — R73.01 IFG (IMPAIRED FASTING GLUCOSE): ICD-10-CM

## 2023-06-27 DIAGNOSIS — E78.1 HYPERTRIGLYCERIDEMIA: ICD-10-CM

## 2023-06-27 DIAGNOSIS — F31.81 BIPOLAR 2 DISORDER (HCC): ICD-10-CM

## 2023-06-27 DIAGNOSIS — F41.1 GENERALIZED ANXIETY DISORDER: ICD-10-CM

## 2023-06-27 DIAGNOSIS — E78.2 MIXED HYPERLIPIDEMIA: ICD-10-CM

## 2023-06-27 DIAGNOSIS — I10 BENIGN ESSENTIAL HYPERTENSION: ICD-10-CM

## 2023-06-27 LAB
ALBUMIN SERPL BCP-MCNC: 4.4 G/DL (ref 3.5–5)
ALP SERPL-CCNC: 29 U/L (ref 34–104)
ALT SERPL W P-5'-P-CCNC: 31 U/L (ref 7–52)
ANION GAP SERPL CALCULATED.3IONS-SCNC: 6 MMOL/L
AST SERPL W P-5'-P-CCNC: 20 U/L (ref 13–39)
BILIRUB SERPL-MCNC: 0.54 MG/DL (ref 0.2–1)
BUN SERPL-MCNC: 23 MG/DL (ref 5–25)
CALCIUM SERPL-MCNC: 9.7 MG/DL (ref 8.4–10.2)
CHLORIDE SERPL-SCNC: 107 MMOL/L (ref 96–108)
CHOLEST SERPL-MCNC: 171 MG/DL
CO2 SERPL-SCNC: 27 MMOL/L (ref 21–32)
CREAT SERPL-MCNC: 1.29 MG/DL (ref 0.6–1.3)
EST. AVERAGE GLUCOSE BLD GHB EST-MCNC: 111 MG/DL
GFR SERPL CREATININE-BSD FRML MDRD: 59 ML/MIN/1.73SQ M
GLUCOSE P FAST SERPL-MCNC: 93 MG/DL (ref 65–99)
HBA1C MFR BLD: 5.5 %
HDLC SERPL-MCNC: 41 MG/DL
LDLC SERPL CALC-MCNC: 89 MG/DL (ref 0–100)
LDLC SERPL DIRECT ASSAY-MCNC: 106 MG/DL (ref 0–100)
NONHDLC SERPL-MCNC: 130 MG/DL
POTASSIUM SERPL-SCNC: 4.4 MMOL/L (ref 3.5–5.3)
PROT SERPL-MCNC: 6.8 G/DL (ref 6.4–8.4)
SODIUM SERPL-SCNC: 140 MMOL/L (ref 135–147)
TRIGL SERPL-MCNC: 203 MG/DL
TSH SERPL DL<=0.05 MIU/L-ACNC: 2.38 UIU/ML (ref 0.45–4.5)

## 2023-06-27 PROCEDURE — 84443 ASSAY THYROID STIM HORMONE: CPT

## 2023-06-27 PROCEDURE — 83036 HEMOGLOBIN GLYCOSYLATED A1C: CPT

## 2023-06-27 PROCEDURE — 83721 ASSAY OF BLOOD LIPOPROTEIN: CPT

## 2023-06-27 PROCEDURE — 36415 COLL VENOUS BLD VENIPUNCTURE: CPT

## 2023-06-27 PROCEDURE — 80061 LIPID PANEL: CPT

## 2023-06-27 PROCEDURE — 80053 COMPREHEN METABOLIC PANEL: CPT

## 2023-06-27 NOTE — PROGRESS NOTES
Assessment/Plan:  Problem List Items Addressed This Visit        Endocrine    IFG (impaired fasting glucose) - Primary     Improved  Insurance would not cover Metformin XR 500mg 3 pills QD or pre-DM education  Recommend lifestyle modifications  To consider short-acting Metformin in future vs  GLP-1 agonist in future PRN? Relevant Orders    Comprehensive metabolic panel    Hemoglobin A1C    Hypogonadism in male     Management per [facility]  Continue Androgel  Respiratory    RUKHSANA on CPAP     Continue CPAP  Management per Sleep Medicine  Asthma due to seasonal allergies     Improved  Management per asthma/allergy  Continue Singular 10 mg 1 pill at bedtime;  and AirDuo 113/14 mcg 1 puffs twice daily PRN and Albuterol HFA PRN  Relevant Medications    montelukast (SINGULAIR) 10 mg tablet       Cardiovascular and Mediastinum    Benign essential hypertension     Borderline BP  Check blood pressure outside of office  Recommend lifestyle modifications  Relevant Medications    lisinopril (ZESTRIL) 30 mg tablet    Other Relevant Orders    Comprehensive metabolic panel       Other    Mixed hyperlipidemia     Stable  Continue Lipitor 20 mg nightly  Recommend lifestyle modifications  Relevant Medications    atorvastatin (LIPITOR) 20 mg tablet    fenofibrate 160 MG tablet    Other Relevant Orders    Comprehensive metabolic panel    Lipid panel    LDL cholesterol, direct    Obesity, unspecified     Stable  Recommend lifestyle modifications  Relevant Orders    TSH, 3rd generation with Free T4 reflex    Generalized anxiety disorder     Stable  Continue Cymbalta 90mg daily, Seroquel 25mg QHS  Smart phone nathaniel list and counseling list provided previously  Patient is considering counseling             Relevant Medications    DULoxetine (CYMBALTA) 30 mg delayed release capsule    DULoxetine (CYMBALTA) 60 mg delayed release capsule    Other Relevant Orders    TSH, 3rd generation with Free T4 reflex    Bipolar 2 disorder (HCC)     Stable  Continue Cymbalta 90mg daily, Seroquel 25mg QHS  Smart phone nathaniel list and counseling list provided previously  Patient is considering counseling  Relevant Medications    DULoxetine (CYMBALTA) 30 mg delayed release capsule    DULoxetine (CYMBALTA) 60 mg delayed release capsule    Other Relevant Orders    TSH, 3rd generation with Free T4 reflex    Hemoglobin A1C    Hypertriglyceridemia     Improved  Continue Fenofibrate 160 mg daily  Recommend lifestyle modifications  Relevant Medications    atorvastatin (LIPITOR) 20 mg tablet    fenofibrate 160 MG tablet    Other Relevant Orders    Lipid panel    LDL cholesterol, direct    History of colon polyps     Colonoscopy is up to date  Other Visit Diagnoses     Moderate persistent asthma with exacerbation        Relevant Medications    montelukast (SINGULAIR) 10 mg tablet           Return in about 4 months (around 10/28/2023) for 4mo - IFG, HTN, HL, Anx, BPD, Low T, RUKHSANA, M Obesity, Labs  Future Appointments   Date Time Provider Oh Clement   8/24/2023  1:30 PM Deyanira Blanchard PA-C [facility]   10/30/2023  2:00 PM Teresa Joe DO [facility]        Subjective:     Hilario Gary is a 66 y o  male who presents today for a follow-up on his chronic medical conditions  HPI:  Chief Complaint   Patient presents with   • Follow-up      4mo - IFG, HTN, HL, Anx, BPD, Low T, RUKHSANA, M Obesity, Labs  -- Above per clinical staff and reviewed  --      HPI      Today:      Return in about 4 months (around 6/9/2023) for 4mo - IFG, HTN, HL, Anx, BPD, Low T, RUKHSANA, M Obesity, Labs      4mo OV       Retired late 9/21        Obesity - Trying to watching diet - avoiding salt in food, but eating increased portions of healthy food, struggling with sugar intake    No Regular exercise - Previously Swimming for 30 minutes, 4 days per week; previously Walking with wife 45 minutes, 4 times per week; bike riding 35 minutes, 2 times per week         IFG - Patient never started Metformin XR 500mg 3 pills QD as it was not covered by his insurance   S/p Pre-DM education - last attended 3/9/22, insurance would no longer cover           HTN - On Lisinopril 30mg QD   No higher dose or other HTN Rx previously   No BP check outside of office  Argenis Shetty has an arm BP cuff at home   Stable Echo 10/10/19        Hyperlipidemia - On Lipitor 20mg QHS   No higher doses   Unsure of other statin name that he had tried previously - ineffective   s/p clean cardiac cath 10/4/19       Hypertriglyceridemia - On Fenofibrate 160mg QD x 4 months        RUKHSANA on CPAP - Uses CPAP regularly   Management per Sleep Specialist on [address] - Dr Janneth Villanueva   Next appt 11/23   Last sleep study 2017?, no weight changes in the interim        Bipolar Disorder - On Seroquel 25mg QHS  He felt too tired on 50mg pill after taking for 2-3 weeks   D/C Rexulti 2mg QHS due to fatigue   Feels 90% improved   Has more energy, no longer napping, not irritable, improved motivation   He previously requested to decrease his Cymbalta dose from 90mg QD to 60mg QD 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, but noticed worsening mood and Cymbalta 90mg QD was resumed 4/11/22   D/C Lamictal due to hives   Good social supports   Last counseling 2018 - helpful   No SI/HI/AH/VH  Argenis Shetty is on waiting list for individual, but not couple's, researching online counseling as of 6/28/23          Anxiety - On Seroquel 50mg QHS - only taking 1/2 pill as felt too tired on full pill after taking for 2-3 weeks    D/C Rexulti 2mg QHS due to fatigue   On increased Cymbalta 90mg QD   He no longer feels drained by his anxiety due to increased work stressors, and is able to cope better   Feels 90% improved   Previously on Zoloft - ineffective        PHQ-2/9 Depression Screening    Little interest or pleasure in doing things: 0 - not at all  Feeling down, depressed, or hopeless: 0 - not at all  Trouble falling or staying asleep, or sleeping too much: 0 - not at all  Feeling tired or having little energy: 0 - not at all  Poor appetite or overeatin - not at all  Feeling bad about yourself - or that you are a failure or have let yourself or your family down: 0 - not at all  Trouble concentrating on things, such as reading the newspaper or watching television: 0 - not at all  Moving or speaking so slowly that other people could have noticed  Or the opposite - being so fidgety or restless that you have been moving around a lot more than usual: 0 - not at all  Thoughts that you would be better off dead, or of hurting yourself in some way: 0 - not at all  PHQ-2 Score: 0  PHQ-2 Interpretation: Negative depression screen  PHQ-9 Score: 0   PHQ-9 Interpretation: No or Minimal depression          VENANCIO-7 Flowsheet Screening    Flowsheet Row Most Recent Value   Over the last 2 weeks, how often have you been bothered by any of the following problems? Feeling nervous, anxious, or on edge 0   Not being able to stop or control worrying 0   Worrying too much about different things 0   Trouble relaxing 0   Being so restless that it is hard to sit still 0   Becoming easily annoyed or irritable 0   Feeling afraid as if something awful might happen 0   VENANCIO-7 Total Score 0           MDQ:  2, Asynchronous, No Problem        Low Testosterone - x 2 20 and 20   Management per [facility] Ms Marie Delacruz PA-C   Next appt    On Androgel since 22 - using QOD AMA due to running low on Rx - pt will call [facility] for refill   Low libido, + ED   No improvement in mood or fatigue since Androgel started 22         Asthma - Management per Allergist Dr Aura Caicedo - next appt  / PRN    Triggered by allergies   Uses Albuterol HFA PRN - last used ;  and AirDuo /14 1 puff BID - last used    Last use 3/20   No ICS previously  Burke Mehreen       Soledad's Syndrome - x 2   S/p Antibiotics   Never followed c Rheum        H/O Fabi De La Garza - next colonoscopy 12/10/2023        Reviewed:  Labs 6/27/23, [facility] 2/20/23, Allergist 6/6/23     Sees [facility]  The following portions of the patient's history were reviewed and updated as appropriate: allergies, current medications, past family history, past medical history, past social history, past surgical history and problem list       Review of Systems   Constitutional: Negative for appetite change, chills, diaphoresis, fatigue and fever  Respiratory: Negative for cough, chest tightness, shortness of breath and wheezing  Cardiovascular: Negative for chest pain  Gastrointestinal: Negative for abdominal pain, blood in stool, diarrhea, nausea and vomiting  Genitourinary: Negative for dysuria  Current Outpatient Medications   Medication Sig Dispense Refill   • albuterol (PROVENTIL HFA,VENTOLIN HFA) 90 mcg/act inhaler Inhale 2 puffs every 4 (four) hours as needed for wheezing or shortness of breath 18 g 0   • atorvastatin (LIPITOR) 20 mg tablet Take 1 tablet (20 mg total) by mouth daily at bedtime 90 tablet 2   • cholecalciferol (VITAMIN D3) 1,000 units tablet Take 1,000 Units by mouth daily     • Cyanocobalamin (B-12) 1000 MCG TABS Take 1 tablet by mouth in the morning     • DULoxetine (CYMBALTA) 30 mg delayed release capsule Take 1 capsule (30 mg total) by mouth daily Take with 60mg pill for a total of 90mg daily  90 capsule 2   • DULoxetine (CYMBALTA) 60 mg delayed release capsule Take 1 capsule (60 mg total) by mouth daily Take with 30mg pill for a total of 90mg daily   90 capsule 2   • fenofibrate 160 MG tablet Take 1 tablet (160 mg total) by mouth daily 90 tablet 2   • lisinopril (ZESTRIL) 30 mg tablet Take 1 tablet (30 mg total) by mouth daily 90 tablet 2   • Magnesium 400 MG TABS Take 1 tablet by mouth in the morning     • montelukast (SINGULAIR) 10 mg tablet Take 1 "tablet (10 mg total) by mouth daily at bedtime 90 tablet 2   • QUEtiapine (SEROquel) 25 mg tablet Take 1 tablet (25 mg total) by mouth daily at bedtime 90 tablet 2   • testosterone (ANDROGEL) 1% Apply 1 packet (50 mg total) topically daily 90 packet 1   • fluticasone-salmeterol (AirDuo [employer]piClick 617/58) 718-14 mcg/act dry powder inhaler Inhale 1 puff every 12 (twelve) hours Rinse mouth after use  (Patient not taking: Reported on 6/28/2023) 1 each 2     No current facility-administered medications for this visit  Objective:  /78   Pulse 101   Temp 97 9 °F (36 6 °C)   Resp 18   Ht 5' 7\" (1 702 m)   Wt 111 kg (245 lb)   SpO2 97%   BMI 38 37 kg/m²    Wt Readings from Last 3 Encounters:   06/28/23 111 kg (245 lb)   06/06/23 112 kg (248 lb)   05/31/23 112 kg (248 lb)      BP Readings from Last 3 Encounters:   06/28/23 130/78   06/06/23 134/96   05/31/23 132/74          Physical Exam  Vitals and nursing note reviewed  Constitutional:       Appearance: Normal appearance  He is well-developed  He is obese  HENT:      Head: Normocephalic and atraumatic  Eyes:      Conjunctiva/sclera: Conjunctivae normal    Neck:      Thyroid: No thyromegaly  Vascular: No carotid bruit  Cardiovascular:      Rate and Rhythm: Normal rate and regular rhythm  Pulses: Normal pulses  Heart sounds: Normal heart sounds  Pulmonary:      Effort: Pulmonary effort is normal       Breath sounds: Normal breath sounds  Abdominal:      General: Bowel sounds are normal  There is no distension  Palpations: Abdomen is soft  There is no mass  Tenderness: There is no abdominal tenderness  There is no guarding or rebound  Musculoskeletal:         General: No swelling or tenderness  Cervical back: Neck supple  Right lower leg: No edema  Left lower leg: No edema  Lymphadenopathy:      Cervical: No cervical adenopathy  Neurological:      General: No focal deficit present        Mental Status: " "He is alert and oriented to person, place, and time  Psychiatric:         Mood and Affect: Mood normal          Behavior: Behavior normal          Thought Content: Thought content normal          Judgment: Judgment normal          Lab Results:      Lab Results   Component Value Date    WBC 6 56 02/07/2023    HGB 16 3 02/07/2023    HCT 49 7 (H) 02/07/2023     02/07/2023    TRIG 203 (H) 06/27/2023    HDL 41 06/27/2023    LDLDIRECT 106 (H) 06/27/2023    ALT 31 06/27/2023    AST 20 06/27/2023    K 4 4 06/27/2023     06/27/2023    CREATININE 1 29 06/27/2023    BUN 23 06/27/2023    CO2 27 06/27/2023    PSA 1 3 08/17/2022    INR 1 0 10/01/2019    GLUF 93 06/27/2023    HGBA1C 5 5 06/27/2023     No results found for: \"URICACID\"  Invalid input(s): \"BASENAME\" Vitamin D    No results found       POCT Labs                       "

## 2023-06-28 ENCOUNTER — OFFICE VISIT (OUTPATIENT)
Dept: FAMILY MEDICINE CLINIC | Facility: CLINIC | Age: 63
End: 2023-06-28
Payer: COMMERCIAL

## 2023-06-28 VITALS
RESPIRATION RATE: 18 BRPM | TEMPERATURE: 97.9 F | WEIGHT: 245 LBS | OXYGEN SATURATION: 97 % | HEIGHT: 67 IN | HEART RATE: 101 BPM | BODY MASS INDEX: 38.45 KG/M2 | SYSTOLIC BLOOD PRESSURE: 130 MMHG | DIASTOLIC BLOOD PRESSURE: 78 MMHG

## 2023-06-28 DIAGNOSIS — F41.1 GENERALIZED ANXIETY DISORDER: ICD-10-CM

## 2023-06-28 DIAGNOSIS — J45.41 MODERATE PERSISTENT ASTHMA WITH EXACERBATION: ICD-10-CM

## 2023-06-28 DIAGNOSIS — E66.01 CLASS 2 SEVERE OBESITY WITH SERIOUS COMORBIDITY AND BODY MASS INDEX (BMI) OF 38.0 TO 38.9 IN ADULT, UNSPECIFIED OBESITY TYPE (HCC): ICD-10-CM

## 2023-06-28 DIAGNOSIS — F31.81 BIPOLAR 2 DISORDER (HCC): ICD-10-CM

## 2023-06-28 DIAGNOSIS — E78.1 HYPERTRIGLYCERIDEMIA: ICD-10-CM

## 2023-06-28 DIAGNOSIS — Z99.89 OSA ON CPAP: ICD-10-CM

## 2023-06-28 DIAGNOSIS — Z86.010 HISTORY OF COLON POLYPS: ICD-10-CM

## 2023-06-28 DIAGNOSIS — E78.2 MIXED HYPERLIPIDEMIA: ICD-10-CM

## 2023-06-28 DIAGNOSIS — E29.1 HYPOGONADISM IN MALE: ICD-10-CM

## 2023-06-28 DIAGNOSIS — I10 BENIGN ESSENTIAL HYPERTENSION: ICD-10-CM

## 2023-06-28 DIAGNOSIS — G47.33 OSA ON CPAP: ICD-10-CM

## 2023-06-28 DIAGNOSIS — J45.909 ASTHMA DUE TO SEASONAL ALLERGIES: ICD-10-CM

## 2023-06-28 DIAGNOSIS — R73.01 IFG (IMPAIRED FASTING GLUCOSE): Primary | ICD-10-CM

## 2023-06-28 PROCEDURE — 99214 OFFICE O/P EST MOD 30 MIN: CPT | Performed by: FAMILY MEDICINE

## 2023-06-28 RX ORDER — DULOXETIN HYDROCHLORIDE 30 MG/1
30 CAPSULE, DELAYED RELEASE ORAL DAILY
Qty: 90 CAPSULE | Refills: 2 | Status: SHIPPED | OUTPATIENT
Start: 2023-06-28

## 2023-06-28 RX ORDER — ATORVASTATIN CALCIUM 20 MG/1
20 TABLET, FILM COATED ORAL
Qty: 90 TABLET | Refills: 2 | Status: SHIPPED | OUTPATIENT
Start: 2023-06-28

## 2023-06-28 RX ORDER — FENOFIBRATE 160 MG/1
160 TABLET ORAL DAILY
Qty: 90 TABLET | Refills: 2 | Status: SHIPPED | OUTPATIENT
Start: 2023-06-28

## 2023-06-28 RX ORDER — MONTELUKAST SODIUM 10 MG/1
10 TABLET ORAL
Qty: 90 TABLET | Refills: 2 | Status: SHIPPED | OUTPATIENT
Start: 2023-06-28

## 2023-06-28 RX ORDER — LISINOPRIL 30 MG/1
30 TABLET ORAL DAILY
Qty: 90 TABLET | Refills: 2 | Status: SHIPPED | OUTPATIENT
Start: 2023-06-28

## 2023-06-28 RX ORDER — DULOXETIN HYDROCHLORIDE 60 MG/1
60 CAPSULE, DELAYED RELEASE ORAL DAILY
Qty: 90 CAPSULE | Refills: 2 | Status: SHIPPED | OUTPATIENT
Start: 2023-06-28

## 2023-06-28 NOTE — ASSESSMENT & PLAN NOTE
Improved  Management per asthma/allergy  Continue Singular 10 mg 1 pill at bedtime;  and AirDuo 113/14 mcg 1 puffs twice daily PRN and Albuterol HFA PRN

## 2023-06-28 NOTE — ASSESSMENT & PLAN NOTE
Stable  Continue Cymbalta 90mg daily, Seroquel 25mg QHS  Smart phone nathaniel list and counseling list provided previously  Patient is considering counseling

## 2023-06-28 NOTE — ASSESSMENT & PLAN NOTE
Improved  Insurance would not cover Metformin XR 500mg 3 pills QD or pre-DM education  Recommend lifestyle modifications  To consider short-acting Metformin in future vs  GLP-1 agonist in future PRN?

## 2023-07-28 ENCOUNTER — OFFICE VISIT (OUTPATIENT)
Dept: FAMILY MEDICINE CLINIC | Facility: CLINIC | Age: 63
End: 2023-07-28
Payer: COMMERCIAL

## 2023-07-28 VITALS
HEIGHT: 67 IN | WEIGHT: 247 LBS | RESPIRATION RATE: 18 BRPM | HEART RATE: 88 BPM | OXYGEN SATURATION: 97 % | SYSTOLIC BLOOD PRESSURE: 136 MMHG | TEMPERATURE: 97.1 F | BODY MASS INDEX: 38.77 KG/M2 | DIASTOLIC BLOOD PRESSURE: 84 MMHG

## 2023-07-28 DIAGNOSIS — H61.21 HEARING LOSS OF RIGHT EAR DUE TO CERUMEN IMPACTION: ICD-10-CM

## 2023-07-28 DIAGNOSIS — E66.01 CLASS 2 SEVERE OBESITY WITH SERIOUS COMORBIDITY AND BODY MASS INDEX (BMI) OF 38.0 TO 38.9 IN ADULT, UNSPECIFIED OBESITY TYPE (HCC): ICD-10-CM

## 2023-07-28 DIAGNOSIS — I10 BENIGN ESSENTIAL HYPERTENSION: ICD-10-CM

## 2023-07-28 DIAGNOSIS — H69.81 ACUTE DYSFUNCTION OF RIGHT EUSTACHIAN TUBE: ICD-10-CM

## 2023-07-28 DIAGNOSIS — H61.21 IMPACTED CERUMEN OF RIGHT EAR: Primary | ICD-10-CM

## 2023-07-28 DIAGNOSIS — E29.1 HYPOGONADISM IN MALE: ICD-10-CM

## 2023-07-28 PROCEDURE — 99214 OFFICE O/P EST MOD 30 MIN: CPT | Performed by: FAMILY MEDICINE

## 2023-07-28 NOTE — PATIENT INSTRUCTIONS
Advise Edison lubin sinus rinse kit, Mucinex, Claritin/Zyrtec/Allegra/Xyzal, Flonase / Nasacort nasal spray. Avoid decongestants if you have high blood pressure. Eustachian Tube Dysfunction   AMBULATORY CARE:   Eustachian tube dysfunction (ETD)  is a condition that prevents your eustachian tubes from opening properly. It can also cause them to become blocked. Eustachian tubes connect your middle ear to the back of your nose and throat. These tubes open and allow air to flow in and out when you sneeze, swallow, or yawn. Common signs and symptoms include the following:   Fullness or pressure in your ears    Muffled hearing, or a feeling you are hearing under water or have clogged ears    Pain in one or both ears    Ringing in your ears    Popping, crackling, or clicking feeling in your ears    Trouble keeping your balance    Call your doctor or otolaryngologist if:   Your symptoms do not improve or get worse. You have a fever. You have any hearing loss. You have questions or concerns about your condition or care. Treatment:  ETD may get better on its own without any treatment. If it continues, you may need any of the following:  Swallow, yawn, or chew gum  to help open your eustachian tubes. Your healthcare provider may also recommend you blow with your mouth shut and your nostrils pinched closed. Air pressure devices  push air into your nose and eustachian tubes to help relieve air pressure in your ear. Treatment for allergies  such as decongestants, antihistamines, and nasal steroids may improve ETD. They may help decrease swelling of the eustachian tubes. A myringotomy  is surgery to make a hole in your eardrum. The hole relieves pressure and lets fluid drain from your ear. A pressure equalizing (PE) tube may be used to keep the hole open and to help drain fluid. Tuboplasty  is a procedure to widen your eustachian tubes.     Follow up with your doctor or otolaryngologist as directed:  Write down your questions so you remember to ask them during your visits. © Copyright [employer] 2022 Information is for End User's use only and may not be sold, redistributed or otherwise used for commercial purposes. The above information is an  only. It is not intended as medical advice for individual conditions or treatments. Talk to your doctor, nurse or pharmacist before following any medical regimen to see if it is safe and effective for you. Earwax Blockage   AMBULATORY CARE:   Earwax  can build up in your ear canal and cause a blockage. Earwax blockage happens when your ear makes earwax faster than your body can remove it. Common symptoms include the following:   Trouble hearing    Earache    Ear fullness or a feeling that something is plugging up your ear    Itching or ringing in your ear    Dizziness    Seed immediate care if:   You feel dizzy. You have discharge or blood coming out of your ear. Your ear pain does not go away or gets worse. Call your doctor if:   You have a fever. You have trouble hearing or hear ringing noises. You have questions or concerns about your condition or care. Treatment for earwax blockage:   Medicines  placed in the ear canal can soften the earwax so it will come out. Flushing your ear canal  with warm water may flush out the earwax. Small medical tools  may be used to remove the earwax. How to prevent earwax blockage:  Do not stick anything into your ears to clean them. Use cotton swabs on the outside of your ear only. Ask your healthcare provider for more information on ways to prevent blockage. Follow up with your doctor as directed:  Write down your questions so you remember to ask them during your visits. © Copyright [employer] 2022 Information is for End User's use only and may not be sold, redistributed or otherwise used for commercial purposes. The above information is an  only.  It is not intended as medical advice for individual conditions or treatments. Talk to your doctor, nurse or pharmacist before following any medical regimen to see if it is safe and effective for you.

## 2023-07-28 NOTE — PROGRESS NOTES
Assessment/Plan:  Problem List Items Addressed This Visit        Endocrine    Hypogonadism in male     Management per [facility]. Patient is not taking Androgel and is advised to discuss c Uro. Cardiovascular and Mediastinum    Benign essential hypertension     Borderline BP. Check blood pressure outside of office. Recommend lifestyle modifications. Other    Obesity, unspecified     Stable. Recommend lifestyle modifications. Other Visit Diagnoses     Impacted cerumen of right ear    -  Primary    Patient tolerated right ear lavage well. Advise Debrox. Avoid Q-tips. Handout given. Acute dysfunction of right eustachian tube       Advise Whit Caprice med sinus rinse kit, Mucinex, Claritin/Zyrtec/Allegra/Xyzal, Flonase / Nasacort nasal spray. Avoid decongestants if you have high blood pressure. Handout given. Hearing loss of right ear due to cerumen impaction        Resolved post right ear lavage. Return if symptoms worsen or fail to improve. Future Appointments   Date Time Provider [facility] [address]   10/30/2023  2:00 PM Jose Jewell DO [facility]        Subjective:     Angeles Stanton is a 66 y.o. male who presents today for a follow-up on his acute medical conditions. HPI:  Chief Complaint   Patient presents with   • Ear Fullness     R ear fullness for the last few months. Describes the discomfort as "feels like when you have water in your ear." Tenderness behind the ear. No other sx.      -- Above per clinical staff and reviewed. --    HPI      Today:      Right Ear Congestion - Symptoms intermittently x 4 months, consistent for 2 weeks. Worse s/p ear candling and no results. Feels as though water is stuck in his ear. Right Eustachian tube area is tender c palpation. No other OTC meds. Denies otorrhea. Decreased muffled hearing in Right Ear.         The following portions of the patient's history were reviewed and updated as appropriate: allergies, current medications, past family history, past medical history, past social history, past surgical history and problem list.      Review of Systems   Constitutional: Negative for appetite change, chills, diaphoresis, fatigue and fever. HENT: Positive for hearing loss. Negative for ear discharge and ear pain. Respiratory: Negative for chest tightness and shortness of breath. Cardiovascular: Negative for chest pain. Gastrointestinal: Negative for abdominal pain, blood in stool, diarrhea, nausea and vomiting. Genitourinary: Negative for dysuria. Current Outpatient Medications   Medication Sig Dispense Refill   • atorvastatin (LIPITOR) 20 mg tablet Take 1 tablet (20 mg total) by mouth daily at bedtime 90 tablet 2   • cholecalciferol (VITAMIN D3) 1,000 units tablet Take 1,000 Units by mouth daily     • Cyanocobalamin (B-12) 1000 MCG TABS Take 1 tablet by mouth in the morning     • DULoxetine (CYMBALTA) 30 mg delayed release capsule Take 1 capsule (30 mg total) by mouth daily Take with 60mg pill for a total of 90mg daily. 90 capsule 2   • DULoxetine (CYMBALTA) 60 mg delayed release capsule Take 1 capsule (60 mg total) by mouth daily Take with 30mg pill for a total of 90mg daily. 90 capsule 2   • fenofibrate 160 MG tablet Take 1 tablet (160 mg total) by mouth daily 90 tablet 2   • lisinopril (ZESTRIL) 30 mg tablet Take 1 tablet (30 mg total) by mouth daily 90 tablet 2   • Magnesium 400 MG TABS Take 1 tablet by mouth in the morning     • QUEtiapine (SEROquel) 25 mg tablet Take 1 tablet (25 mg total) by mouth daily at bedtime 90 tablet 2   • albuterol (PROVENTIL HFA,VENTOLIN HFA) 90 mcg/act inhaler Inhale 2 puffs every 4 (four) hours as needed for wheezing or shortness of breath (Patient not taking: Reported on 7/28/2023) 18 g 0   • fluticasone-salmeterol (AirDuo RespiClick 126/79) 823-09 mcg/act dry powder inhaler Inhale 1 puff every 12 (twelve) hours Rinse mouth after use. (Patient not taking: Reported on 6/28/2023) 1 each 2   • montelukast (SINGULAIR) 10 mg tablet Take 1 tablet (10 mg total) by mouth daily at bedtime (Patient not taking: Reported on 7/28/2023) 90 tablet 2   • testosterone (ANDROGEL) 1% Apply 1 packet (50 mg total) topically daily (Patient not taking: Reported on 7/28/2023) 90 packet 1     No current facility-administered medications for this visit. Objective:  /84   Pulse 88   Temp (!) 97.1 °F (36.2 °C)   Resp 18   Ht 5' 7" (1.702 m)   Wt 112 kg (247 lb)   SpO2 97%   BMI 38.69 kg/m²    Wt Readings from Last 3 Encounters:   07/28/23 112 kg (247 lb)   06/28/23 111 kg (245 lb)   06/06/23 112 kg (248 lb)      BP Readings from Last 3 Encounters:   07/28/23 136/84   06/28/23 130/78   06/06/23 134/96          Physical Exam  Vitals and nursing note reviewed. Constitutional:       Appearance: Normal appearance. He is well-developed. He is obese. HENT:      Head: Normocephalic and atraumatic. Right Ear: Tympanic membrane, ear canal and external ear normal. There is impacted cerumen. Left Ear: Tympanic membrane, ear canal and external ear normal.      Ears:      Comments: R TM occluded by cerumen. S/p lavage - R TM clear, gray, intact. Nose: Nose normal.      Right Sinus: No maxillary sinus tenderness or frontal sinus tenderness. Left Sinus: No maxillary sinus tenderness or frontal sinus tenderness. Mouth/Throat:      Mouth: Mucous membranes are moist.      Pharynx: Oropharynx is clear. Uvula midline. Tonsils: No tonsillar exudate. Eyes:      Extraocular Movements: Extraocular movements intact. Conjunctiva/sclera: Conjunctivae normal.   Cardiovascular:      Rate and Rhythm: Normal rate and regular rhythm. Pulses: Normal pulses. Heart sounds: Normal heart sounds. Pulmonary:      Effort: Pulmonary effort is normal.      Breath sounds: Normal breath sounds.    Musculoskeletal:         General: No swelling or tenderness. Cervical back: Neck supple. Right lower leg: No edema. Left lower leg: No edema. Lymphadenopathy:      Cervical: No cervical adenopathy. Skin:     Findings: No rash. Neurological:      General: No focal deficit present. Mental Status: He is alert and oriented to person, place, and time. Psychiatric:         Mood and Affect: Mood normal.         Lab Results:      Lab Results   Component Value Date    WBC 6.56 02/07/2023    HGB 16.3 02/07/2023    HCT 49.7 (H) 02/07/2023     02/07/2023    TRIG 203 (H) 06/27/2023    HDL 41 06/27/2023    LDLDIRECT 106 (H) 06/27/2023    ALT 31 06/27/2023    AST 20 06/27/2023    K 4.4 06/27/2023     06/27/2023    CREATININE 1.29 06/27/2023    BUN 23 06/27/2023    CO2 27 06/27/2023    PSA 1.3 08/17/2022    INR 1.0 10/01/2019    GLUF 93 06/27/2023    HGBA1C 5.5 06/27/2023     No results found for: "URICACID"  Invalid input(s): "BASENAME" Vitamin D    No results found.      POCT Labs

## 2023-09-25 DIAGNOSIS — F31.81 BIPOLAR 2 DISORDER (HCC): ICD-10-CM

## 2023-09-25 DIAGNOSIS — E78.2 MIXED HYPERLIPIDEMIA: ICD-10-CM

## 2023-09-25 DIAGNOSIS — F41.1 GENERALIZED ANXIETY DISORDER: ICD-10-CM

## 2023-09-25 DIAGNOSIS — I10 BENIGN ESSENTIAL HYPERTENSION: ICD-10-CM

## 2023-09-26 RX ORDER — QUETIAPINE FUMARATE 25 MG/1
25 TABLET, FILM COATED ORAL
Qty: 90 TABLET | Refills: 0 | Status: SHIPPED | OUTPATIENT
Start: 2023-09-26

## 2023-09-26 RX ORDER — LISINOPRIL 30 MG/1
30 TABLET ORAL DAILY
Qty: 90 TABLET | Refills: 0 | Status: SHIPPED | OUTPATIENT
Start: 2023-09-26

## 2023-09-26 RX ORDER — ATORVASTATIN CALCIUM 20 MG/1
20 TABLET, FILM COATED ORAL
Qty: 90 TABLET | Refills: 0 | Status: SHIPPED | OUTPATIENT
Start: 2023-09-26

## 2023-09-26 NOTE — TELEPHONE ENCOUNTER
Patient needs updated blood work and has previously placed orders. Please contact patient to go for labs.

## 2023-10-09 DIAGNOSIS — E78.1 HYPERTRIGLYCERIDEMIA: ICD-10-CM

## 2023-10-09 RX ORDER — FENOFIBRATE 160 MG/1
160 TABLET ORAL DAILY
Qty: 90 TABLET | Refills: 0 | Status: SHIPPED | OUTPATIENT
Start: 2023-10-09

## 2023-10-23 ENCOUNTER — RA CDI HCC (OUTPATIENT)
Dept: OTHER | Facility: HOSPITAL | Age: 63
End: 2023-10-23

## 2023-10-23 NOTE — PROGRESS NOTES
[address] [facility] coding opportunities       Chart reviewed, no opportunity found: CHART REVIEWED, NO OPPORTUNITY FOUND        Patients Insurance        Commercial Insurance: [employer]

## 2023-10-26 ENCOUNTER — LAB (OUTPATIENT)
Dept: LAB | Facility: CLINIC | Age: 63
End: 2023-10-26
Payer: COMMERCIAL

## 2023-10-26 DIAGNOSIS — E78.2 MIXED HYPERLIPIDEMIA: ICD-10-CM

## 2023-10-26 DIAGNOSIS — R73.01 IFG (IMPAIRED FASTING GLUCOSE): ICD-10-CM

## 2023-10-26 DIAGNOSIS — E66.01 CLASS 2 SEVERE OBESITY WITH SERIOUS COMORBIDITY AND BODY MASS INDEX (BMI) OF 38.0 TO 38.9 IN ADULT, UNSPECIFIED OBESITY TYPE: ICD-10-CM

## 2023-10-26 DIAGNOSIS — I10 BENIGN ESSENTIAL HYPERTENSION: ICD-10-CM

## 2023-10-26 DIAGNOSIS — F41.1 GENERALIZED ANXIETY DISORDER: ICD-10-CM

## 2023-10-26 DIAGNOSIS — F31.81 BIPOLAR 2 DISORDER (HCC): ICD-10-CM

## 2023-10-26 DIAGNOSIS — E78.1 HYPERTRIGLYCERIDEMIA: ICD-10-CM

## 2023-10-26 LAB
ALBUMIN SERPL BCP-MCNC: 4.6 G/DL (ref 3.5–5)
ALP SERPL-CCNC: 34 U/L (ref 34–104)
ALT SERPL W P-5'-P-CCNC: 30 U/L (ref 7–52)
ANION GAP SERPL CALCULATED.3IONS-SCNC: 5 MMOL/L
AST SERPL W P-5'-P-CCNC: 20 U/L (ref 13–39)
BILIRUB SERPL-MCNC: 0.59 MG/DL (ref 0.2–1)
BUN SERPL-MCNC: 19 MG/DL (ref 5–25)
CALCIUM SERPL-MCNC: 9.7 MG/DL (ref 8.4–10.2)
CHLORIDE SERPL-SCNC: 105 MMOL/L (ref 96–108)
CHOLEST SERPL-MCNC: 163 MG/DL
CO2 SERPL-SCNC: 29 MMOL/L (ref 21–32)
CREAT SERPL-MCNC: 1.23 MG/DL (ref 0.6–1.3)
EST. AVERAGE GLUCOSE BLD GHB EST-MCNC: 123 MG/DL
GFR SERPL CREATININE-BSD FRML MDRD: 62 ML/MIN/1.73SQ M
GLUCOSE P FAST SERPL-MCNC: 97 MG/DL (ref 65–99)
HBA1C MFR BLD: 5.9 %
HDLC SERPL-MCNC: 39 MG/DL
LDLC SERPL CALC-MCNC: 82 MG/DL (ref 0–100)
LDLC SERPL DIRECT ASSAY-MCNC: 97 MG/DL (ref 0–100)
NONHDLC SERPL-MCNC: 124 MG/DL
POTASSIUM SERPL-SCNC: 4.1 MMOL/L (ref 3.5–5.3)
PROT SERPL-MCNC: 7.4 G/DL (ref 6.4–8.4)
SODIUM SERPL-SCNC: 139 MMOL/L (ref 135–147)
TRIGL SERPL-MCNC: 211 MG/DL
TSH SERPL DL<=0.05 MIU/L-ACNC: 2.27 UIU/ML (ref 0.45–4.5)

## 2023-10-26 PROCEDURE — 83721 ASSAY OF BLOOD LIPOPROTEIN: CPT

## 2023-10-26 PROCEDURE — 84443 ASSAY THYROID STIM HORMONE: CPT

## 2023-10-26 PROCEDURE — 83036 HEMOGLOBIN GLYCOSYLATED A1C: CPT

## 2023-10-26 PROCEDURE — 36415 COLL VENOUS BLD VENIPUNCTURE: CPT

## 2023-10-26 PROCEDURE — 80061 LIPID PANEL: CPT

## 2023-10-26 PROCEDURE — 80053 COMPREHEN METABOLIC PANEL: CPT

## 2023-10-26 NOTE — RESULT ENCOUNTER NOTE
Unstable labs - will review with patient at upcoming appointment. IFG - Worsening. To consider Metformin XR 500mg 3 pills daily? If insurance will still not cover, To consider short-acting Metformin?

## 2023-10-30 ENCOUNTER — OFFICE VISIT (OUTPATIENT)
Dept: FAMILY MEDICINE CLINIC | Facility: CLINIC | Age: 63
End: 2023-10-30
Payer: COMMERCIAL

## 2023-10-30 VITALS
HEART RATE: 96 BPM | DIASTOLIC BLOOD PRESSURE: 80 MMHG | BODY MASS INDEX: 35.88 KG/M2 | RESPIRATION RATE: 16 BRPM | WEIGHT: 228.6 LBS | TEMPERATURE: 98.4 F | SYSTOLIC BLOOD PRESSURE: 128 MMHG | HEIGHT: 67 IN | OXYGEN SATURATION: 97 %

## 2023-10-30 DIAGNOSIS — F41.1 GENERALIZED ANXIETY DISORDER: ICD-10-CM

## 2023-10-30 DIAGNOSIS — Z86.010 HISTORY OF COLON POLYPS: ICD-10-CM

## 2023-10-30 DIAGNOSIS — J45.909 ASTHMA DUE TO SEASONAL ALLERGIES: ICD-10-CM

## 2023-10-30 DIAGNOSIS — Z13.6 SCREENING FOR CARDIOVASCULAR CONDITION: ICD-10-CM

## 2023-10-30 DIAGNOSIS — F31.81 BIPOLAR 2 DISORDER (HCC): ICD-10-CM

## 2023-10-30 DIAGNOSIS — E78.2 MIXED HYPERLIPIDEMIA: ICD-10-CM

## 2023-10-30 DIAGNOSIS — R73.01 IFG (IMPAIRED FASTING GLUCOSE): ICD-10-CM

## 2023-10-30 DIAGNOSIS — Z23 ENCOUNTER FOR IMMUNIZATION: ICD-10-CM

## 2023-10-30 DIAGNOSIS — E66.01 CLASS 2 SEVERE OBESITY WITH SERIOUS COMORBIDITY AND BODY MASS INDEX (BMI) OF 35.0 TO 35.9 IN ADULT, UNSPECIFIED OBESITY TYPE: ICD-10-CM

## 2023-10-30 DIAGNOSIS — E29.1 HYPOGONADISM IN MALE: ICD-10-CM

## 2023-10-30 DIAGNOSIS — G47.33 OSA ON CPAP: ICD-10-CM

## 2023-10-30 DIAGNOSIS — E78.1 HYPERTRIGLYCERIDEMIA: ICD-10-CM

## 2023-10-30 DIAGNOSIS — I10 BENIGN ESSENTIAL HYPERTENSION: ICD-10-CM

## 2023-10-30 DIAGNOSIS — R73.01 IFG (IMPAIRED FASTING GLUCOSE): Primary | ICD-10-CM

## 2023-10-30 DIAGNOSIS — Z13.1 DIABETES MELLITUS SCREENING: ICD-10-CM

## 2023-10-30 PROCEDURE — 99214 OFFICE O/P EST MOD 30 MIN: CPT | Performed by: FAMILY MEDICINE

## 2023-10-30 PROCEDURE — 90471 IMMUNIZATION ADMIN: CPT

## 2023-10-30 PROCEDURE — 90686 IIV4 VACC NO PRSV 0.5 ML IM: CPT

## 2023-10-30 PROCEDURE — 3725F SCREEN DEPRESSION PERFORMED: CPT | Performed by: FAMILY MEDICINE

## 2023-10-30 RX ORDER — QUETIAPINE FUMARATE 25 MG/1
25 TABLET, FILM COATED ORAL
Qty: 90 TABLET | Refills: 2 | Status: SHIPPED | OUTPATIENT
Start: 2023-10-30

## 2023-10-30 RX ORDER — FENOFIBRATE 160 MG/1
160 TABLET ORAL DAILY
Qty: 90 TABLET | Refills: 2 | Status: SHIPPED | OUTPATIENT
Start: 2023-10-30

## 2023-10-30 RX ORDER — METFORMIN HYDROCHLORIDE 500 MG/1
1500 TABLET, EXTENDED RELEASE ORAL
Qty: 270 TABLET | Refills: 0 | Status: SHIPPED | OUTPATIENT
Start: 2023-10-30

## 2023-10-30 RX ORDER — LISINOPRIL 30 MG/1
30 TABLET ORAL DAILY
Qty: 90 TABLET | Refills: 2 | Status: SHIPPED | OUTPATIENT
Start: 2023-10-30

## 2023-10-30 RX ORDER — ATORVASTATIN CALCIUM 20 MG/1
20 TABLET, FILM COATED ORAL
Qty: 90 TABLET | Refills: 2 | Status: SHIPPED | OUTPATIENT
Start: 2023-10-30

## 2023-10-30 RX ORDER — METFORMIN HYDROCHLORIDE 500 MG/1
1500 TABLET, EXTENDED RELEASE ORAL
Qty: 90 TABLET | Refills: 1 | Status: SHIPPED | OUTPATIENT
Start: 2023-10-30 | End: 2023-10-30 | Stop reason: SDUPTHER

## 2023-10-30 NOTE — PROGRESS NOTES
Assessment/Plan:  Problem List Items Addressed This Visit        Endocrine    IFG (impaired fasting glucose) - Primary     Worsening. Start Metformin XR 500mg 3 pills QD. Insurance would not cover pre-DM education. Recommend lifestyle modifications. To consider short-acting Metformin in future vs. GLP-1 agonist in future PRN? Relevant Orders    Comprehensive metabolic panel    Hemoglobin A1C    Hypogonadism in male     Management per Uro - overdue for appt. Patient is not taking Androgel and is advised to discuss c Uro. Respiratory    RUKHSANA on CPAP     Continue CPAP. Management per Sleep Medicine. Asthma due to seasonal allergies     Management per asthma/allergy. Stable. Continue Singular 10 mg 1 pill at bedtime PRN and AirDuo 113/14 mcg 1 puff twice daily PRN, and Albuterol HFA PRN. Cardiovascular and Mediastinum    Benign essential hypertension     Stable. Check blood pressure outside of office. Recommend lifestyle modifications. Relevant Medications    lisinopril (ZESTRIL) 30 mg tablet    Other Relevant Orders    Magnesium       Other    Mixed hyperlipidemia     Stable. Continue Lipitor 20 mg nightly. Recommend lifestyle modifications. Relevant Medications    atorvastatin (LIPITOR) 20 mg tablet    fenofibrate 160 MG tablet    Other Relevant Orders    CBC and differential    Comprehensive metabolic panel    Lipid panel    TSH, 3rd generation with Free T4 reflex    LDL cholesterol, direct    Obesity, unspecified     Improved. Recommend lifestyle modifications. Generalized anxiety disorder     Stable. Continue Cymbalta 90mg daily, Seroquel 25mg QHS. Smart phone nathaniel list and counseling list provided previously. Relevant Medications    QUEtiapine (SEROquel) 25 mg tablet    Bipolar 2 disorder (HCC)     Stable. Continue Cymbalta 90mg daily, Seroquel 25mg QHS. Smart phone nathaniel list and counseling list provided previously. Relevant Medications    QUEtiapine (SEROquel) 25 mg tablet    Hypertriglyceridemia     Stable. Continue Fenofibrate 160 mg daily. Recommend lifestyle modifications. Relevant Medications    atorvastatin (LIPITOR) 20 mg tablet    fenofibrate 160 MG tablet    History of colon polyps     Colonoscopy is up to date. Relevant Orders    Ambulatory Referral to Gastroenterology   Other Visit Diagnoses     Screening for cardiovascular condition        Relevant Orders    CBC and differential    Comprehensive metabolic panel    Lipid panel    LDL cholesterol, direct    Diabetes mellitus screening        Relevant Orders    Hemoglobin A1C    Encounter for immunization        Relevant Orders    influenza vaccine, quadrivalent, 0.5 mL, preservative-free, for adult and pediatric patients 6 mos+ (AFLURIA, FLUARIX, FLULAVAL, FLUZONE) (Completed)           Return in about 4 months (around 2/29/2024) for Physical / 4mo - IFG, HTN, HL, Anx, BPD, Low T, RUKHSANA, M Obesity, Labs. Future Appointments   Date Time Provider [facility] [address]   3/4/2024  1:20 PM Kurt Martin, DO [facility]          Subjective:     Truong Barrera is a 66 y.o. male who presents today for a follow-up on his chronic medical conditions. HPI:  Chief Complaint   Patient presents with   • Follow-up     4mo - IFG, HTN, HL, Anx, BPD, Low T, RUKHSANA, M Obesity, Labs. No new problems or concerns at this time. •      Pt had a covid booster recently at [facility] on [facility]. Flu shot today, ordered. Will call GI and schedule CRC. -- Above per clinical staff and reviewed. --      HPI      Today:         Return in about 4 months (around 10/28/2023) for 4mo - IFG, HTN, HL, Anx, BPD, Low T, RUKHSANA, M Obesity, Labs. 4mo OV       Retired late 9/21. Obesity - Trying to watching diet - avoiding salt in food, but eating increased portions of healthy food, struggling with sugar intake.   Regular exercise - Walking with wife 30 minutes, 7 times per week. IFG - Patient never started Metformin XR 500mg 3 pills QD as it was not covered by his insurance. S/p Pre-DM education - last attended 3/9/22, insurance would no longer cover. HTN - On Lisinopril 30mg QD. No higher dose or other HTN Rx previously. No BP check outside of office. He has an arm BP cuff at home. Stable Echo 10/10/19. Hyperlipidemia - On Lipitor 20mg QHS. No higher doses. Unsure of other statin name that he had tried previously - ineffective. s/p clean cardiac cath 10/4/19. Hypertriglyceridemia - On Fenofibrate 160mg QD. RUKHSANA on CPAP - Uses CPAP regularly. Management per Sleep Specialist on [address] - Dr. Ajay Sena - Next appt 11/23. Last sleep study 2017?, no weight changes in the interim. Bipolar Disorder - On Seroquel 25mg QHS. He felt too tired on 50mg pill after taking for 2-3 weeks. D/C Rexulti 2mg QHS due to fatigue. Feels 90% improved. Has more energy, no longer napping, not irritable, improved motivation. He previously requested to decrease his Cymbalta dose from 90mg QD to 60mg QD due to decreased stress in [facility], but noticed worsening mood and Cymbalta 90mg QD was resumed 4/11/22. D/C Lamictal due to hives. Good social supports. Last counseling 2018 - helpful. No SI/HI/AH/VH. He is on waiting list for individual, but not couple's, researching online counseling as of 6/28/23. Anxiety - On Seroquel 25mg QHS - as felt too tired on 50mg after taking for 2-3 weeks. D/C Rexulti 2mg QHS due to fatigue. On increased Cymbalta 90mg QD. He no longer feels drained by his anxiety due to increased work stressors, and is able to cope better. Feels 90% improved. Previously on Zoloft - ineffective.           PHQ-2/9 Depression Screening    Little interest or pleasure in doing things: 0 - not at all  Feeling down, depressed, or hopeless: 0 - not at all  Trouble falling or staying asleep, or sleeping too much: 0 - not at all  Feeling tired or having little energy: 0 - not at all  Poor appetite or overeatin - not at all  Feeling bad about yourself - or that you are a failure or have let yourself or your family down: 0 - not at all  Trouble concentrating on things, such as reading the newspaper or watching television: 0 - not at all  Moving or speaking so slowly that other people could have noticed. Or the opposite - being so fidgety or restless that you have been moving around a lot more than usual: 0 - not at all  Thoughts that you would be better off dead, or of hurting yourself in some way: 0 - not at all  PHQ-2 Score: 0  PHQ-2 Interpretation: Negative depression screen  PHQ-9 Score: 0   PHQ-9 Interpretation: No or Minimal depression          VENANCIO-7 Flowsheet Screening    Flowsheet Row Most Recent Value   Over the last 2 weeks, how often have you been bothered by any of the following problems? Feeling nervous, anxious, or on edge 0   Not being able to stop or control worrying 0   Worrying too much about different things 0   Trouble relaxing 0   Being so restless that it is hard to sit still 0   Becoming easily annoyed or irritable 0   Feeling afraid as if something awful might happen 0   VENANCIO-7 Total Score 0              MDQ:  2, Asynchronous, No Problem        Low Testosterone - x 2 20 and 20. Management per [facility] Ms. Morgan Ford PA-C. Next appt  - Overdue, risks of stopping Androgel discussed. On Androgel since 22 - using QOD AMA due to running low on Rx - pt will call [facility] for refill, stopped Androgel  due to feeling better. Low libido resolved. + ED. Asthma - Management per Allergist Dr. Maria Teresa Alonso - next appt  / PRN. Triggered by allergies. Uses Albuterol HFA PRN - last used ;  and AirDuo 113/14 1 puff BID PRN - last used . Last use 3/20. No ICS previously. 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 Never followed c Rheum.        H/O Colon Polyps - Colonoscopy per GI 2013 GI Dr. Supriya Purdy - next colonoscopy 12/10/2023. Reviewed:  Labs 10/26/23, Uro 2/20/23, Allergist 6/6/23     Sees Uro. The following portions of the patient's history were reviewed and updated as appropriate: allergies, current medications, past family history, past medical history, past social history, past surgical history and problem list.      Review of Systems   Constitutional:  Negative for appetite change, chills, diaphoresis, fatigue and fever. Respiratory:  Negative for chest tightness and shortness of breath. Cardiovascular:  Negative for chest pain. Gastrointestinal:  Positive for constipation. Negative for abdominal pain, blood in stool, diarrhea, nausea and vomiting. Genitourinary:  Negative for dysuria. Current Outpatient Medications   Medication Sig Dispense Refill   • atorvastatin (LIPITOR) 20 mg tablet Take 1 tablet (20 mg total) by mouth daily at bedtime 90 tablet 2   • cholecalciferol (VITAMIN D3) 1,000 units tablet Take 1,000 Units by mouth daily     • Cyanocobalamin (B-12) 1000 MCG TABS Take 1 tablet by mouth in the morning     • DULoxetine (CYMBALTA) 30 mg delayed release capsule Take 1 capsule (30 mg total) by mouth daily Take with 60mg pill for a total of 90mg daily. 90 capsule 2   • DULoxetine (CYMBALTA) 60 mg delayed release capsule Take 1 capsule (60 mg total) by mouth daily Take with 30mg pill for a total of 90mg daily. 90 capsule 2   • fenofibrate 160 MG tablet Take 1 tablet (160 mg total) by mouth daily 90 tablet 2   • fluticasone-salmeterol (AirDuo RespiClick 306/51) 046-13 mcg/act dry powder inhaler Inhale 1 puff every 12 (twelve) hours Rinse mouth after use.  (Patient taking differently: Inhale 1 puff 2 (two) times a day as needed Rinse mouth after use.) 1 each 2   • lisinopril (ZESTRIL) 30 mg tablet Take 1 tablet (30 mg total) by mouth daily 90 tablet 2   • Magnesium 400 MG TABS Take 1 tablet by mouth in the morning     • montelukast (SINGULAIR) 10 mg tablet Take 1 tablet (10 mg total) by mouth daily at bedtime (Patient taking differently: Take 10 mg by mouth daily at bedtime as needed (Asthma) Rx per Allergist) 90 tablet 2   • QUEtiapine (SEROquel) 25 mg tablet Take 1 tablet (25 mg total) by mouth daily at bedtime 90 tablet 2   • albuterol (PROVENTIL HFA,VENTOLIN HFA) 90 mcg/act inhaler Inhale 2 puffs every 4 (four) hours as needed for wheezing or shortness of breath (Patient not taking: Reported on 10/30/2023) 18 g 0   • metFORMIN (GLUCOPHAGE-XR) 500 mg 24 hr tablet Take 3 tablets (1,500 mg total) by mouth daily with breakfast Increase as directed. 270 tablet 0   • testosterone (ANDROGEL) 1% Apply 1 packet (50 mg total) topically daily (Patient not taking: Reported on 7/28/2023) 90 packet 1     No current facility-administered medications for this visit. Objective:  /80   Pulse 96   Temp 98.4 °F (36.9 °C)   Resp 16   Ht 5' 7" (1.702 m)   Wt 104 kg (228 lb 9.6 oz)   SpO2 97%   BMI 35.80 kg/m²    Wt Readings from Last 3 Encounters:   10/30/23 104 kg (228 lb 9.6 oz)   07/28/23 112 kg (247 lb)   06/28/23 111 kg (245 lb)      BP Readings from Last 3 Encounters:   10/30/23 128/80   07/28/23 136/84   06/28/23 130/78          Physical Exam  Vitals and nursing note reviewed. Constitutional:       Appearance: Normal appearance. He is well-developed. He is obese. HENT:      Head: Normocephalic and atraumatic. Eyes:      Conjunctiva/sclera: Conjunctivae normal.   Neck:      Thyroid: No thyromegaly. Vascular: No carotid bruit. Cardiovascular:      Rate and Rhythm: Normal rate and regular rhythm. Pulses: Normal pulses. Heart sounds: Normal heart sounds. Pulmonary:      Effort: Pulmonary effort is normal.      Breath sounds: Normal breath sounds. Abdominal:      General: Bowel sounds are normal. There is no distension. Palpations: Abdomen is soft. There is no mass. Tenderness:  There is no abdominal tenderness. There is no guarding or rebound. Musculoskeletal:         General: No swelling or tenderness. Cervical back: Neck supple. Right lower leg: No edema. Left lower leg: No edema. Lymphadenopathy:      Cervical: No cervical adenopathy. Neurological:      General: No focal deficit present. Mental Status: He is alert and oriented to person, place, and time. Psychiatric:         Mood and Affect: Mood normal.         Behavior: Behavior normal.         Thought Content: Thought content normal.         Judgment: Judgment normal.         Lab Results:      Lab Results   Component Value Date    WBC 6.56 02/07/2023    HGB 16.3 02/07/2023    HCT 49.7 (H) 02/07/2023     02/07/2023    TRIG 211 (H) 10/26/2023    HDL 39 (L) 10/26/2023    LDLDIRECT 97 10/26/2023    ALT 30 10/26/2023    AST 20 10/26/2023    K 4.1 10/26/2023     10/26/2023    CREATININE 1.23 10/26/2023    BUN 19 10/26/2023    CO2 29 10/26/2023    PSA 1.3 08/17/2022    INR 1.0 10/01/2019    GLUF 97 10/26/2023    HGBA1C 5.9 (H) 10/26/2023     No results found for: "URICACID"  Invalid input(s): "BASENAME" Vitamin D    No results found.      POCT Labs

## 2023-10-30 NOTE — PATIENT INSTRUCTIONS
Increase Metformin XR 500mg every 3 days stomach tolerates:  1 pill in AM; 2 pills in AM; 3 pills in AM.  Take with food.

## 2023-10-30 NOTE — TELEPHONE ENCOUNTER
Reason for call:   [x] Refill   [] Prior Auth  [] Other:     Office:   [x] PCP/Provider - Aristeo Carrero   [] Specialty/Provider -     Medication: Metformin     Dose/Frequency: 500 Mg 3 daily     Quantity: 270    Pharmacy: [facility]     Does the patient have enough for 3 days?    [] Yes   [] No - Send as HP to POD

## 2023-10-31 ENCOUNTER — PATIENT MESSAGE (OUTPATIENT)
Dept: FAMILY MEDICINE CLINIC | Facility: CLINIC | Age: 63
End: 2023-10-31

## 2023-10-31 NOTE — ASSESSMENT & PLAN NOTE
Worsening. Start Metformin XR 500mg 3 pills QD. Insurance would not cover pre-DM education. Recommend lifestyle modifications. To consider short-acting Metformin in future vs. GLP-1 agonist in future PRN?

## 2023-10-31 NOTE — ASSESSMENT & PLAN NOTE
Management per Uro - overdue for appt. Patient is not taking Androgel and is advised to discuss c Uro.

## 2023-10-31 NOTE — ASSESSMENT & PLAN NOTE
Stable. Continue Cymbalta 90mg daily, Seroquel 25mg QHS. Smart phone nathaniel list and counseling list provided previously.

## 2023-11-01 ENCOUNTER — PREP FOR PROCEDURE (OUTPATIENT)
Age: 63
End: 2023-11-01

## 2023-11-01 ENCOUNTER — TELEPHONE (OUTPATIENT)
Age: 63
End: 2023-11-01

## 2023-11-01 DIAGNOSIS — Z12.11 SCREENING FOR COLON CANCER: Primary | ICD-10-CM

## 2023-11-01 NOTE — TELEPHONE ENCOUNTER
11/01/23  Screened by: Kenya Patricia    Referring Provider Madonna    Pre- Screening: There is no height or weight on file to calculate BMI. 35.80  Has patient been referred for a routine screening Colonoscopy? yes  Is the patient between 43-73 years old? yes      Previous Colonoscopy yes   If yes:    Date: 10 yrs     Facility:     Reason:       SCHEDULING STAFF: If the patient is between 45yrs-49yrs, please advise patient to confirm benefits/coverage with their insurance company for a routine screening colonoscopy, some insurance carriers will only cover at [address] [place] [address] or older. If the patient is over 66years old, please schedule an office visit. Does the patient want to see a Gastroenterologist prior to their procedure OR are they having any GI symptoms? no    Has the patient been hospitalized or had abdominal surgery in the past 6 months? no    Does the patient use supplemental oxygen? no    Does the patient take Coumadin, Lovenox, Plavix, Elliquis, Xarelto, or other blood thinning medication? no    Has the patient had a stroke, cardiac event, or stent placed in the past year? no    PT PASS OA     SCHEDULING STAFF: If patient answers NO to above questions, then schedule procedure. If patient answers YES to above questions, then schedule office appointment. If patient is between 45yrs - 49yrs, please advise patient that we will have to confirm benefits & coverage with their insurance company for a routine screening colonoscopy.
ASC Screening    ASC Screening  BMI > than 45: No  Are you currently pregnant?: No  Do you rely on a wheelchair for mobility?: No  Do you need oxygen during the day?: No  Have you ever been informed by anesthesia that you have a difficult airway?: No  Have you been diagnosed with End Stage Renal Disease (ESRD)?: No  Are you actively on dialysis?: No  Have you been diagnosed with Pulmonary Hypertension?: No  Do you have a pacemaker or an Automatic Implantable Cardioverter Defibrillator (AICD)?: No  Have you ever had an organ transplant?: No  Have you had a stroke, heart attack, myocardial infarction (MI) within the last 6 months?: No  Have you ever been diagnosed with Aortic Stenosis?: No  Have you ever been diagnosed  with Congestive Heart Failure?: No  Have you ever been diagnosed with a heart valve disease?: No  Are you Diabetic?: No  If you are Diabetic, has your A1C been greater than 12 within the last six months?: N/A
Scheduled date of colonoscopy (as of today): 12/8     Physician performing colonoscopy: IFRAH    Location of colonoscopy: [facility]    Bowel prep reviewed with patient: DUYEN/JACQUE    Instructions reviewed with patient by: SENT VIA PORTAL     Clearances:  NONE
mild right?

## 2023-11-19 ENCOUNTER — PATIENT MESSAGE (OUTPATIENT)
Dept: FAMILY MEDICINE CLINIC | Facility: CLINIC | Age: 63
End: 2023-11-19

## 2023-11-20 NOTE — PATIENT COMMUNICATION
Krys Galdamez, (PART 1)  I have been having servere constipation now for about 2.5 months now. It has been pretty serious. I noticed that the Fenofibrate does have a side effect of constipation. Here is what I found:     https://medlineplus.gov/druginfo/meds/z219632.html  What side effects can this medication (Fenofibrate) cause? Fenofibrate may cause side effects.  Tell your doctor if any of these symptoms are severe or do not go away:     constipation (I have this)  diarrhea (I have this with the laxative)  heartburn (I have it)  pain in the back, arm, or legs ( I have pain in the back from being bloated)  headache (no)  joint pain (yes)     (see part 2 message)

## 2023-12-08 ENCOUNTER — ANESTHESIA EVENT (OUTPATIENT)
Dept: GASTROENTEROLOGY | Facility: AMBULATORY SURGERY CENTER | Age: 63
End: 2023-12-08

## 2023-12-08 ENCOUNTER — ANESTHESIA (OUTPATIENT)
Dept: GASTROENTEROLOGY | Facility: AMBULATORY SURGERY CENTER | Age: 63
End: 2023-12-08

## 2023-12-08 ENCOUNTER — HOSPITAL ENCOUNTER (OUTPATIENT)
Dept: GASTROENTEROLOGY | Facility: AMBULATORY SURGERY CENTER | Age: 63
Discharge: HOME | End: 2023-12-08
Attending: COLON & RECTAL SURGERY
Payer: COMMERCIAL

## 2023-12-08 VITALS
OXYGEN SATURATION: 95 % | RESPIRATION RATE: 18 BRPM | HEART RATE: 75 BPM | BODY MASS INDEX: 34.06 KG/M2 | TEMPERATURE: 97.4 F | SYSTOLIC BLOOD PRESSURE: 123 MMHG | WEIGHT: 217 LBS | HEIGHT: 67 IN | DIASTOLIC BLOOD PRESSURE: 77 MMHG

## 2023-12-08 DIAGNOSIS — Z12.11 SCREENING FOR COLON CANCER: ICD-10-CM

## 2023-12-08 PROCEDURE — 45385 COLONOSCOPY W/LESION REMOVAL: CPT | Performed by: COLON & RECTAL SURGERY

## 2023-12-08 PROCEDURE — 88305 TISSUE EXAM BY PATHOLOGIST: CPT | Performed by: STUDENT IN AN ORGANIZED HEALTH CARE EDUCATION/TRAINING PROGRAM

## 2023-12-08 RX ORDER — SODIUM CHLORIDE, SODIUM LACTATE, POTASSIUM CHLORIDE, CALCIUM CHLORIDE 600; 310; 30; 20 MG/100ML; MG/100ML; MG/100ML; MG/100ML
125 INJECTION, SOLUTION INTRAVENOUS CONTINUOUS
Status: CANCELLED | OUTPATIENT
Start: 2023-12-08

## 2023-12-08 RX ORDER — PROPOFOL 10 MG/ML
INJECTION, EMULSION INTRAVENOUS AS NEEDED
Status: DISCONTINUED | OUTPATIENT
Start: 2023-12-08 | End: 2023-12-08

## 2023-12-08 RX ORDER — SODIUM CHLORIDE, SODIUM LACTATE, POTASSIUM CHLORIDE, CALCIUM CHLORIDE 600; 310; 30; 20 MG/100ML; MG/100ML; MG/100ML; MG/100ML
125 INJECTION, SOLUTION INTRAVENOUS CONTINUOUS
Status: DISCONTINUED | OUTPATIENT
Start: 2023-12-08 | End: 2023-12-12 | Stop reason: HOSPADM

## 2023-12-08 RX ADMIN — PROPOFOL 50 MG: 10 INJECTION, EMULSION INTRAVENOUS at 09:37

## 2023-12-08 RX ADMIN — PROPOFOL 50 MG: 10 INJECTION, EMULSION INTRAVENOUS at 09:49

## 2023-12-08 RX ADMIN — PROPOFOL 50 MG: 10 INJECTION, EMULSION INTRAVENOUS at 09:45

## 2023-12-08 RX ADMIN — PROPOFOL 50 MG: 10 INJECTION, EMULSION INTRAVENOUS at 09:41

## 2023-12-08 RX ADMIN — PROPOFOL 150 MG: 10 INJECTION, EMULSION INTRAVENOUS at 09:25

## 2023-12-08 RX ADMIN — SODIUM CHLORIDE, SODIUM LACTATE, POTASSIUM CHLORIDE, CALCIUM CHLORIDE: 600; 310; 30; 20 INJECTION, SOLUTION INTRAVENOUS at 08:57

## 2023-12-08 RX ADMIN — PROPOFOL 50 MG: 10 INJECTION, EMULSION INTRAVENOUS at 09:33

## 2023-12-08 RX ADMIN — PROPOFOL 50 MG: 10 INJECTION, EMULSION INTRAVENOUS at 09:29

## 2023-12-08 NOTE — H&P
History and Physical   Colon and Rectal Surgery   Hailee Srinivasan 66 y.o. male MRN: [id number]  Unit/Bed#:  Encounter: [id number]  12/08/23   9:01 AM      CC:  History of polyps. History of Present Illness   HPI:  Hailee Srinivasan is a 66 y.o. male with recent constipation. He had no trouble with his prep. Historical Information   Past Medical History:   Diagnosis Date    Anxiety     Asthma     Asthma due to seasonal allergies     Benign essential hypertension 08/16/2019    Bipolar 2 disorder ([facility]) 02/20/2015    CPAP (continuous positive airway pressure) dependence     Depression     Diverticulosis     History of colon polyps     Hypertension     Hypertriglyceridemia 06/01/2022    Hypogonadism in male 06/01/2022    IFG (impaired fasting glucose)     Mixed hyperlipidemia 08/16/2019    Morbid obesity ([facility]) 04/13/2021    Obesity     RUKHSANA on CPAP 10/14/2020    Soledad's syndrome     x 2, Urethritis, Conjunctivtis, Joint Pains     Past Surgical History:   Procedure Laterality Date    COLONOSCOPY      WISDOM TOOTH EXTRACTION         Meds/Allergies     (Not in a hospital admission)        Current Outpatient Medications:     atorvastatin (LIPITOR) 20 mg tablet, Take 1 tablet (20 mg total) by mouth daily at bedtime, Disp: 90 tablet, Rfl: 2    cholecalciferol (VITAMIN D3) 1,000 units tablet, Take 1,000 Units by mouth daily, Disp: , Rfl:     Cyanocobalamin (B-12) 1000 MCG TABS, Take 1 tablet by mouth in the morning, Disp: , Rfl:     DULoxetine (CYMBALTA) 30 mg delayed release capsule, Take 1 capsule (30 mg total) by mouth daily Take with 60mg pill for a total of 90mg daily. , Disp: 90 capsule, Rfl: 2    DULoxetine (CYMBALTA) 60 mg delayed release capsule, Take 1 capsule (60 mg total) by mouth daily Take with 30mg pill for a total of 90mg daily. , Disp: 90 capsule, Rfl: 2    fenofibrate 160 MG tablet, Take 1 tablet (160 mg total) by mouth daily, Disp: 90 tablet, Rfl: 2    lisinopril (ZESTRIL) 30 mg tablet, Take 1 tablet (30 mg total) by mouth daily, Disp: 90 tablet, Rfl: 2    Magnesium 400 MG TABS, Take 1 tablet by mouth in the morning, Disp: , Rfl:     QUEtiapine (SEROquel) 25 mg tablet, Take 1 tablet (25 mg total) by mouth daily at bedtime, Disp: 90 tablet, Rfl: 2    albuterol (PROVENTIL HFA,VENTOLIN HFA) 90 mcg/act inhaler, Inhale 2 puffs every 4 (four) hours as needed for wheezing or shortness of breath (Patient not taking: Reported on 10/30/2023), Disp: 18 g, Rfl: 0    fluticasone-salmeterol (AirDuo RespiClick 924/65) 401-89 mcg/act dry powder inhaler, Inhale 1 puff every 12 (twelve) hours Rinse mouth after use.  (Patient taking differently: Inhale 1 puff 2 (two) times a day as needed Rinse mouth after use.), Disp: 1 each, Rfl: 2    metFORMIN (GLUCOPHAGE-XR) 500 mg 24 hr tablet, Take 3 tablets (1,500 mg total) by mouth daily with breakfast Increase as directed., Disp: 270 tablet, Rfl: 0    montelukast (SINGULAIR) 10 mg tablet, Take 1 tablet (10 mg total) by mouth daily at bedtime (Patient taking differently: Take 10 mg by mouth daily at bedtime as needed (Asthma) Rx per Allergist), Disp: 90 tablet, Rfl: 2    testosterone (ANDROGEL) 1%, Apply 1 packet (50 mg total) topically daily (Patient not taking: Reported on 7/28/2023), Disp: 90 packet, Rfl: 1    Current Facility-Administered Medications:     lactated ringers infusion, 125 mL/hr, Intravenous, Continuous, Payam Rollins MD, Last Rate: 125 mL/hr at 12/08/23 0857, New Bag at 12/08/23 0857    Allergies   Allergen Reactions    Lamotrigine Hives         Social History   Social History     Substance and Sexual Activity   Alcohol Use Yes    Alcohol/week: 1.0 standard drink of alcohol    Types: 1 Cans of beer per week    Comment: rarely drink     Social History     Substance and Sexual Activity   Drug Use No     Social History     Tobacco Use   Smoking Status Never   Smokeless Tobacco Never         Family History:   Family History   Problem Relation Age of Onset Hypertension Mother     Hyperlipidemia Mother     Anxiety disorder Mother     Bipolar disorder Mother     Osteoarthritis Mother     Coronary artery disease Mother     Hearing loss Mother     Hypertension Father     Coronary artery disease Father 76    Hyperlipidemia Father     Bipolar disorder Father     Obesity Father     Depression Father     Heart failure Father     Kidney disease Father         CKD    COPD Father     Sleep apnea Father     Diabetes type II Father     Heart attack Father 76    Breast cancer Maternal Grandmother     Bipolar disorder Maternal Grandmother     Osteoarthritis Maternal Grandmother     Coronary artery disease Maternal Grandmother     Heart attack Maternal Grandmother     No Known Problems Brother     Aneurysm Maternal Grandfather         Brain    Obesity Paternal Grandmother     Coronary artery disease Paternal Grandfather     Heart attack Paternal Grandfather     No Known Problems Son     Heart disease Neg Hx     Asthma Neg Hx     Arrhythmia Neg Hx     Anemia Neg Hx     Clotting disorder Neg Hx     Fainting Neg Hx          Objective     Current Vitals:   Blood Pressure: 131/81 (12/08/23 0854)  Pulse: 91 (nsr) (12/08/23 0854)  Temperature: (!) 97.4 °F (36.3 °C) (12/08/23 0854)  Temp Source: Temporal (12/08/23 0854)  Respirations: 18 (12/08/23 0854)  Height: 5' 7" (170.2 cm) (12/08/23 0854)  Weight - Scale: 98.4 kg (217 lb) (12/08/23 0854)  SpO2: 98 % (12/08/23 0854)  No intake or output data in the 24 hours ending 12/08/23 0901    Physical Exam:  General:  Well nourished, no distress. Neuro: Alert and oriented  Eyes:Sclera anicteric, conjunctiva pink. Pulm: Clear to auscultation bilaterally. No respiratory Distress. CV:  Regular rate and rhythm. No murmurs. Abdomen:  Soft, flat, non-tender, without masses or hepatosplenomegaly. Lab Results:       ASSESSMENT:  Shimon Wade is a 66 y.o. male for surveillance. Andrés Spine PLAN:  Colonoscopy.   Risks , including, but not limited to, bleeding, perforation, missed lesions, and potential need for surgery, were reviewed. Alternatives to colonoscopy were discussed. A high fiber diet is recommended. I administered a fiber sheet and gave instructions on its use. Supplements were discussed. Exercise is recommended.    Ingrid Govea MD

## 2023-12-08 NOTE — ANESTHESIA POSTPROCEDURE EVALUATION
Post-Op Assessment Note    CV Status:  Stable  Pain Score: 0    Pain management: adequate       Mental Status:  Sleepy and arousable   Hydration Status:  Stable   PONV Controlled:  Controlled   Airway Patency:  Patent     Post Op Vitals Reviewed: Yes      Staff: CRNA               BP   117/63   Temp     Pulse  86   Resp   16   SpO2   98

## 2023-12-08 NOTE — ANESTHESIA PREPROCEDURE EVALUATION
Procedure:  COLONOSCOPY    Relevant Problems   CARDIO   (+) Benign essential hypertension   (+) Hypertriglyceridemia   (+) Mixed hyperlipidemia      NEURO/PSYCH   (+) Generalized anxiety disorder      PULMONARY   (+) Asthma due to seasonal allergies   (+) RUKHSANA on CPAP      Other   (+) Bipolar 2 disorder (HCC)   (+) Obesity, unspecified        Physical Exam    Airway    Mallampati score: II  TM Distance: >3 FB  Neck ROM: full     Dental       Cardiovascular  Cardiovascular exam normal    Pulmonary  Pulmonary exam normal     Other Findings      Hypertension    Asthma due to seasonal allergies    CPAP (continuous positive airway pressure) dependence    Soledad's syndrome x 2, Urethritis, Conjunctivtis, Joint Pains   Bipolar 2 disorder (HCC)    Benign essential hypertension    Mixed hyperlipidemia    RUKHSANA on CPAP    Obesity    History of colon polyps    Diverticulosis    Morbid obesity (HCC)    IFG (impaired fasting glucose)    Hypogonadism in male    Hypertriglyceridemia    Anxiety    Asthma    Depression      LEFT VENTRICLE:  Size was normal.  Systolic function was normal. Ejection fraction was estimated to be 60 %. Wall thickness was normal.  Doppler parameters were consistent with abnormal left ventricular relaxation (grade 1 diastolic dysfunction). RIGHT VENTRICLE:  The size was normal.  Systolic function was normal.     TRICUSPID VALVE:  There was trace regurgitation. Anesthesia Plan  ASA Score- 2     Anesthesia Type- IV sedation with anesthesia with ASA Monitors. Additional Monitors:     Airway Plan:            Plan Factors-Exercise tolerance (METS): >4 METS. Chart reviewed. EKG reviewed. Imaging results reviewed. Existing labs reviewed. Patient summary reviewed. Induction- intravenous. Postoperative Plan-     Informed Consent- Anesthetic plan and risks discussed with patient. I personally reviewed this patient with the CRNA.  Discussed and agreed on the Anesthesia Plan with the EDWARD Silverman

## 2023-12-13 PROCEDURE — 88305 TISSUE EXAM BY PATHOLOGIST: CPT | Performed by: STUDENT IN AN ORGANIZED HEALTH CARE EDUCATION/TRAINING PROGRAM

## 2023-12-21 DIAGNOSIS — F41.1 GENERALIZED ANXIETY DISORDER: ICD-10-CM

## 2023-12-21 RX ORDER — DULOXETIN HYDROCHLORIDE 60 MG/1
60 CAPSULE, DELAYED RELEASE ORAL DAILY
Qty: 90 CAPSULE | Refills: 1 | Status: SHIPPED | OUTPATIENT
Start: 2023-12-21

## 2023-12-21 RX ORDER — DULOXETIN HYDROCHLORIDE 30 MG/1
30 CAPSULE, DELAYED RELEASE ORAL DAILY
Qty: 90 CAPSULE | Refills: 1 | Status: SHIPPED | OUTPATIENT
Start: 2023-12-21

## 2024-01-31 ENCOUNTER — PATIENT MESSAGE (OUTPATIENT)
Dept: FAMILY MEDICINE CLINIC | Facility: CLINIC | Age: 64
End: 2024-01-31

## 2024-03-27 NOTE — TELEPHONE ENCOUNTER
Hi Dr Nitin العلي,     I met with [facility] today for a class assessment for diabetes classes for his prediabetes  I provided him with a glucometer and instructed on use  Can you please call in One Touch Verio test strips and lancets for him please?     Thank you,    Juliana Feliciano 

## 2024-04-05 ENCOUNTER — RA CDI HCC (OUTPATIENT)
Dept: OTHER | Facility: HOSPITAL | Age: 64
End: 2024-04-05

## 2024-04-05 NOTE — PROGRESS NOTES
Asthma due to seasonal allergies  [J45.909]    NOT on the BPA- please assess using MEAT for 2024 billing     HCC coding opportunities          Chart Reviewed number of suggestions sent to Provider: 1     Patients Insurance        Commercial Insurance: [employer]

## 2024-04-15 ENCOUNTER — LAB (OUTPATIENT)
Dept: LAB | Facility: CLINIC | Age: 64
End: 2024-04-15
Payer: COMMERCIAL

## 2024-04-15 DIAGNOSIS — E78.2 MIXED HYPERLIPIDEMIA: ICD-10-CM

## 2024-04-15 DIAGNOSIS — Z13.1 DIABETES MELLITUS SCREENING: ICD-10-CM

## 2024-04-15 DIAGNOSIS — I10 BENIGN ESSENTIAL HYPERTENSION: ICD-10-CM

## 2024-04-15 DIAGNOSIS — Z13.6 SCREENING FOR CARDIOVASCULAR CONDITION: ICD-10-CM

## 2024-04-15 DIAGNOSIS — R73.01 IFG (IMPAIRED FASTING GLUCOSE): ICD-10-CM

## 2024-04-15 LAB
ALBUMIN SERPL BCP-MCNC: 4.3 G/DL (ref 3.5–5)
ALP SERPL-CCNC: 31 U/L (ref 34–104)
ALT SERPL W P-5'-P-CCNC: 18 U/L (ref 7–52)
ANION GAP SERPL CALCULATED.3IONS-SCNC: 6 MMOL/L (ref 4–13)
AST SERPL W P-5'-P-CCNC: 16 U/L (ref 13–39)
BASOPHILS # BLD AUTO: 0.04 THOUSANDS/ÂΜL (ref 0–0.1)
BASOPHILS NFR BLD AUTO: 1 % (ref 0–1)
BILIRUB SERPL-MCNC: 0.66 MG/DL (ref 0.2–1)
BUN SERPL-MCNC: 22 MG/DL (ref 5–25)
CALCIUM SERPL-MCNC: 9.5 MG/DL (ref 8.4–10.2)
CHLORIDE SERPL-SCNC: 105 MMOL/L (ref 96–108)
CHOLEST SERPL-MCNC: 164 MG/DL
CO2 SERPL-SCNC: 29 MMOL/L (ref 21–32)
CREAT SERPL-MCNC: 1.24 MG/DL (ref 0.6–1.3)
EOSINOPHIL # BLD AUTO: 0.22 THOUSAND/ÂΜL (ref 0–0.61)
EOSINOPHIL NFR BLD AUTO: 4 % (ref 0–6)
ERYTHROCYTE [DISTWIDTH] IN BLOOD BY AUTOMATED COUNT: 13.9 % (ref 11.6–15.1)
EST. AVERAGE GLUCOSE BLD GHB EST-MCNC: 117 MG/DL
GFR SERPL CREATININE-BSD FRML MDRD: 61 ML/MIN/1.73SQ M
GLUCOSE P FAST SERPL-MCNC: 85 MG/DL (ref 65–99)
HBA1C MFR BLD: 5.7 %
HCT VFR BLD AUTO: 48.2 % (ref 36.5–49.3)
HDLC SERPL-MCNC: 39 MG/DL
HGB BLD-MCNC: 15.6 G/DL (ref 12–17)
IMM GRANULOCYTES # BLD AUTO: 0.04 THOUSAND/UL (ref 0–0.2)
IMM GRANULOCYTES NFR BLD AUTO: 1 % (ref 0–2)
LDLC SERPL CALC-MCNC: 94 MG/DL (ref 0–100)
LDLC SERPL DIRECT ASSAY-MCNC: 101 MG/DL (ref 0–100)
LYMPHOCYTES # BLD AUTO: 2.27 THOUSANDS/ÂΜL (ref 0.6–4.47)
LYMPHOCYTES NFR BLD AUTO: 39 % (ref 14–44)
MAGNESIUM SERPL-MCNC: 2.3 MG/DL (ref 1.9–2.7)
MCH RBC QN AUTO: 31.6 PG (ref 26.8–34.3)
MCHC RBC AUTO-ENTMCNC: 32.4 G/DL (ref 31.4–37.4)
MCV RBC AUTO: 98 FL (ref 82–98)
MONOCYTES # BLD AUTO: 0.53 THOUSAND/ÂΜL (ref 0.17–1.22)
MONOCYTES NFR BLD AUTO: 9 % (ref 4–12)
NEUTROPHILS # BLD AUTO: 2.67 THOUSANDS/ÂΜL (ref 1.85–7.62)
NEUTS SEG NFR BLD AUTO: 46 % (ref 43–75)
NONHDLC SERPL-MCNC: 125 MG/DL
NRBC BLD AUTO-RTO: 0 /100 WBCS
PLATELET # BLD AUTO: 253 THOUSANDS/UL (ref 149–390)
PMV BLD AUTO: 9.7 FL (ref 8.9–12.7)
POTASSIUM SERPL-SCNC: 4.1 MMOL/L (ref 3.5–5.3)
PROT SERPL-MCNC: 6.9 G/DL (ref 6.4–8.4)
RBC # BLD AUTO: 4.94 MILLION/UL (ref 3.88–5.62)
SODIUM SERPL-SCNC: 140 MMOL/L (ref 135–147)
TRIGL SERPL-MCNC: 157 MG/DL
TSH SERPL DL<=0.05 MIU/L-ACNC: 3.2 UIU/ML (ref 0.45–4.5)
WBC # BLD AUTO: 5.77 THOUSAND/UL (ref 4.31–10.16)

## 2024-04-15 PROCEDURE — 36415 COLL VENOUS BLD VENIPUNCTURE: CPT

## 2024-04-15 PROCEDURE — 83735 ASSAY OF MAGNESIUM: CPT

## 2024-04-15 PROCEDURE — 85025 COMPLETE CBC W/AUTO DIFF WBC: CPT

## 2024-04-15 PROCEDURE — 83721 ASSAY OF BLOOD LIPOPROTEIN: CPT

## 2024-04-15 PROCEDURE — 80053 COMPREHEN METABOLIC PANEL: CPT

## 2024-04-15 PROCEDURE — 80061 LIPID PANEL: CPT

## 2024-04-15 PROCEDURE — 84443 ASSAY THYROID STIM HORMONE: CPT

## 2024-04-15 PROCEDURE — 83036 HEMOGLOBIN GLYCOSYLATED A1C: CPT

## 2024-04-15 NOTE — ASSESSMENT & PLAN NOTE
Uncontrolled  Patient wishes to start OTC fish oil 4 g daily  To consider starting fenofibrate 160 mg daily or Lovaza in the future p r n ? Recommend lifestyle modifications  Pt complains of left shoulder pain. Pt then couldn't lift L arm or move it much. rest of the limbs were fine with no problem

## 2024-04-16 ENCOUNTER — OFFICE VISIT (OUTPATIENT)
Dept: FAMILY MEDICINE CLINIC | Facility: CLINIC | Age: 64
End: 2024-04-16
Payer: COMMERCIAL

## 2024-04-16 VITALS
OXYGEN SATURATION: 97 % | DIASTOLIC BLOOD PRESSURE: 72 MMHG | TEMPERATURE: 98 F | HEIGHT: 67 IN | WEIGHT: 222.4 LBS | HEART RATE: 95 BPM | SYSTOLIC BLOOD PRESSURE: 122 MMHG | RESPIRATION RATE: 16 BRPM | BODY MASS INDEX: 34.91 KG/M2

## 2024-04-16 DIAGNOSIS — Z86.010 HISTORY OF COLON POLYPS: ICD-10-CM

## 2024-04-16 DIAGNOSIS — F41.1 GENERALIZED ANXIETY DISORDER: ICD-10-CM

## 2024-04-16 DIAGNOSIS — G47.33 OSA ON CPAP: ICD-10-CM

## 2024-04-16 DIAGNOSIS — R73.01 IFG (IMPAIRED FASTING GLUCOSE): ICD-10-CM

## 2024-04-16 DIAGNOSIS — E78.1 HYPERTRIGLYCERIDEMIA: ICD-10-CM

## 2024-04-16 DIAGNOSIS — F31.81 BIPOLAR 2 DISORDER (HCC): ICD-10-CM

## 2024-04-16 DIAGNOSIS — Z00.00 ANNUAL PHYSICAL EXAM: Primary | ICD-10-CM

## 2024-04-16 DIAGNOSIS — E66.9 OBESITY (BMI 30.0-34.9): ICD-10-CM

## 2024-04-16 DIAGNOSIS — E29.1 HYPOGONADISM IN MALE: ICD-10-CM

## 2024-04-16 DIAGNOSIS — J45.909 ASTHMA DUE TO SEASONAL ALLERGIES: ICD-10-CM

## 2024-04-16 DIAGNOSIS — E78.2 MIXED HYPERLIPIDEMIA: ICD-10-CM

## 2024-04-16 DIAGNOSIS — I10 BENIGN ESSENTIAL HYPERTENSION: ICD-10-CM

## 2024-04-16 DIAGNOSIS — E66.9 CLASS 1 OBESITY WITH SERIOUS COMORBIDITY AND BODY MASS INDEX (BMI) OF 34.0 TO 34.9 IN ADULT, UNSPECIFIED OBESITY TYPE: ICD-10-CM

## 2024-04-16 PROCEDURE — 99213 OFFICE O/P EST LOW 20 MIN: CPT | Performed by: FAMILY MEDICINE

## 2024-04-16 PROCEDURE — 99396 PREV VISIT EST AGE 40-64: CPT | Performed by: FAMILY MEDICINE

## 2024-04-16 RX ORDER — DULOXETIN HYDROCHLORIDE 30 MG/1
30 CAPSULE, DELAYED RELEASE ORAL DAILY
Qty: 90 CAPSULE | Refills: 2 | Status: SHIPPED | OUTPATIENT
Start: 2024-04-16

## 2024-04-16 RX ORDER — FENOFIBRATE 160 MG/1
160 TABLET ORAL DAILY
Qty: 90 TABLET | Refills: 2 | Status: SHIPPED | OUTPATIENT
Start: 2024-04-16

## 2024-04-16 RX ORDER — DULOXETIN HYDROCHLORIDE 60 MG/1
60 CAPSULE, DELAYED RELEASE ORAL DAILY
Qty: 90 CAPSULE | Refills: 2 | Status: SHIPPED | OUTPATIENT
Start: 2024-04-16

## 2024-04-16 RX ORDER — ATORVASTATIN CALCIUM 20 MG/1
20 TABLET, FILM COATED ORAL
Qty: 90 TABLET | Refills: 2 | Status: SHIPPED | OUTPATIENT
Start: 2024-04-16

## 2024-04-16 RX ORDER — LISINOPRIL 30 MG/1
30 TABLET ORAL DAILY
Qty: 90 TABLET | Refills: 2 | Status: SHIPPED | OUTPATIENT
Start: 2024-04-16

## 2024-04-16 RX ORDER — QUETIAPINE FUMARATE 50 MG/1
50 TABLET, FILM COATED ORAL
Qty: 30 TABLET | Refills: 1 | Status: SHIPPED | OUTPATIENT
Start: 2024-04-16

## 2024-04-16 NOTE — PROGRESS NOTES
Assessment/Plan:  Problem List Items Addressed This Visit          Cardiovascular and Mediastinum    Benign essential hypertension     Stable.  Check blood pressure outside of office.  Recommend lifestyle modifications.           Relevant Medications    lisinopril (ZESTRIL) 30 mg tablet    Other Relevant Orders    Comprehensive metabolic panel    Magnesium       Respiratory    RUKHSANA on CPAP     Continue CPAP.  Management per Sleep Medicine.           Asthma due to seasonal allergies     Management per asthma/allergy.  Stable.  Continue Singular 10 mg 1 pill at bedtime PRN and AirDuo 113/14 mcg 1 puff twice daily PRN, and Albuterol HFA PRN.              Endocrine    IFG (impaired fasting glucose)     Improved.  Patient declines Metformin XR 500mg 3 pills QD.  Insurance would not cover pre-DM education.  Recommend lifestyle modifications.  To consider short-acting Metformin in future vs. GLP-1 agonist in future PRN?           Relevant Orders    Comprehensive metabolic panel    Hemoglobin A1C    Hypogonadism in male     Management per [facility] - overdue for appt.  Patient is not taking Androgel and is advised to discuss c [facility].              Behavioral Health    Generalized anxiety disorder     Unstable due to zora.  Increase Seroquel 50mg QHS.    Continue Cymbalta 90mg daily.  Patient is considering counseling.  Smart phone nathaniel list and counseling list provided previously.         Relevant Medications    QUEtiapine (SEROquel) 50 mg tablet    DULoxetine (CYMBALTA) 30 mg delayed release capsule    DULoxetine (CYMBALTA) 60 mg delayed release capsule    Bipolar 2 disorder (HCC)     Unstable due to zora.  Increase Seroquel 50mg QHS.    Continue Cymbalta 90mg daily.  Patient is considering counseling.  Smart phone nathaniel list and counseling list provided previously.         Relevant Medications    QUEtiapine (SEROquel) 50 mg tablet    DULoxetine (CYMBALTA) 30 mg delayed release capsule    DULoxetine (CYMBALTA) 60 mg delayed release  capsule    Other Relevant Orders    Comprehensive metabolic panel    Magnesium    TSH, 3rd generation with Free T4 reflex    Hemoglobin A1C       Other    Mixed hyperlipidemia     Stable.  Continue Lipitor 20 mg nightly.  Recommend lifestyle modifications.           Relevant Medications    atorvastatin (LIPITOR) 20 mg tablet    fenofibrate 160 MG tablet    Other Relevant Orders    Comprehensive metabolic panel    Lipid panel    TSH, 3rd generation with Free T4 reflex    LDL cholesterol, direct    Obesity, unspecified     Worsening.  Recommend lifestyle modifications.         Relevant Orders    TSH, 3rd generation with Free T4 reflex    Hypertriglyceridemia     Stable.  Continue Fenofibrate 160 mg daily.  Recommend lifestyle modifications.          Relevant Medications    atorvastatin (LIPITOR) 20 mg tablet    fenofibrate 160 MG tablet    Other Relevant Orders    Lipid panel    TSH, 3rd generation with Free T4 reflex    LDL cholesterol, direct    History of colon polyps     Colonoscopy is up to date.            Other Visit Diagnoses       Annual physical exam    -  Primary    Health Maintenance   Topic Date Due   • COVID-19 Vaccine (6 - 2023-24 season) 07/16/2024 (Originally 12/7/2023)   • Annual Physical  04/16/2025   • Colorectal Cancer Screening  12/06/2028   • DTaP,Tdap,and Td Vaccines (3 - Td or Tdap) 02/01/2032   • HIV Screening  Completed   • Hepatitis C Screening  Completed   • Zoster Vaccine  Completed   • Pneumococcal Vaccine: Pediatrics (0 to 5 Years) and At-Risk Patients (6 to 64 Years)  Completed   • Influenza Vaccine  Completed   • HIB Vaccine  Aged Out   • IPV Vaccine  Aged Out   • Hepatitis A Vaccine  Aged Out   • Meningococcal ACWY Vaccine  Aged Out   • HPV Vaccine  Aged Out         Obesity (BMI 30.0-34.9)        Relevant Orders    TSH, 3rd generation with Free T4 reflex    BMI 34.0-34.9,adult        Relevant Orders    TSH, 3rd generation with Free T4 reflex               Return in about 6 weeks  (around 5/28/2024) for F/U BPD, Labs; 8/16/24 4mo - IFG, HTN, HL, Anx, BPD, Low T, RUKHSANA, Obesity, Labs.      Future Appointments   Date Time Provider Department Center   5/30/2024  9:00 AM Mayte Peacock,  [facility]          Subjective:     Aldair is a 64 y.o. male who presents today for a follow-up on his chronic medical conditions.        HPI:  Chief Complaint   Patient presents with   • Physical Exam     Physical / 4mo - IFG, HTN, HL, Anx, BPD, Low T, RUKHSANA, M Obesity, Labs. Pt d/c his Metformin, states his pharmacist told him the dosage was too high and he would like to try to bring his A1c down with lifestyle modifications.    • Depression     Pt states his bipolar disorder has been acting up and has been very manic since January.      -- Above per clinical staff and reviewed. --      HPI      Today:        Return in about 4 months (around 2/29/2024) for Physical / 4mo - IFG, HTN, HL, Anx, BPD, Low T, RUKHSANA, M Obesity, Labs.       4mo OV       Retired late 9/21.       Obesity - Watching diet - avoiding salt in food, but eating increased portions of healthy food, struggling with sugar intake.  Regular exercise - Walking with wife 30 minutes, 7 times per week.  Working with a  twice weekly since 2/24.       IFG - He never started Metformin XR 500mg 3 pills QD because the pharmacist told him the dose was too high.  S/p Pre-DM education - last attended 3/9/22, insurance would no longer cover.          HTN - On Lisinopril 30mg QD.  No higher dose or other HTN Rx previously.  No BP check outside of office.  He has an arm BP cuff at home.  Stable Echo 10/10/19.       Hyperlipidemia - On Lipitor 20mg QHS.  No higher doses.  Unsure of other statin name that he had tried previously - ineffective.  s/p clean cardiac cath 10/4/19.       Hypertriglyceridemia - On Fenofibrate 160mg QD.       RUKHSANA on CPAP - Uses CPAP regularly.  Management per Sleep Specialist on [address] - Dr. Earl Maza - Next appt 12/23.   Last sleep study 2017?, no weight changes in the interim.       Bipolar Disorder - Feels agitated, rapid cycling, and manic since  (3 months ago).  He denies trigger.  On Seroquel 25mg QHS.  He felt too tired on 50mg pill after taking for 2-3 weeks.  D/C Rexulti 2mg QHS due to fatigue.  Feels 90% improved.  Has more energy, no longer napping, not irritable, improved motivation.  He previously requested to decrease his Cymbalta dose from 90mg QD to 60mg QD due to decreased stress in [employer], but noticed worsening mood and Cymbalta 90mg QD was resumed 22.  D/C Lamictal due to hives.  Good social supports.  Last counseling  - helpful.  No SI/HI/AH/VH.  Last individual online counseling  - not a good fit.       Anxiety - On Seroquel 25mg QHS - as felt too tired on 50mg after taking for 2-3 weeks.  D/C Rexulti 2mg QHS due to fatigue.  On increased Cymbalta 90mg QD.  He no longer feels drained by his anxiety due to increased work stressors, and is able to cope better.  Feels 90% improved.  Previously on Zoloft - ineffective.          PHQ-2/9 Depression Screening    Little interest or pleasure in doing things: 1 - several days  Feeling down, depressed, or hopeless: 1 - several days  Trouble falling or staying asleep, or sleeping too much: 1 - several days  Feeling tired or having little energy: 1 - several days  Poor appetite or overeatin - not at all  Feeling bad about yourself - or that you are a failure or have let yourself or your family down: 1 - several days  Trouble concentrating on things, such as reading the newspaper or watching television: 1 - several days  Moving or speaking so slowly that other people could have noticed. Or the opposite - being so fidgety or restless that you have been moving around a lot more than usual: 2 - more than half the days  Thoughts that you would be better off dead, or of hurting yourself in some way: 0 - not at all  PHQ-2 Score: 2  PHQ-2 Interpretation:  Negative depression screen  PHQ-9 Score: 8  PHQ-9 Interpretation: Mild depression       VENANCIO-7 Flowsheet Screening    Flowsheet Row Most Recent Value   Over the last 2 weeks, how often have you been bothered by any of the following problems?    Feeling nervous, anxious, or on edge 1   Not being able to stop or control worrying 0   Worrying too much about different things 1   Trouble relaxing 1   Being so restless that it is hard to sit still 0   Becoming easily annoyed or irritable 2   Feeling afraid as if something awful might happen 0   VENANCIO-7 Total Score 5              MDQ:  13, Synchronous, Minor Problem        Low Testosterone - x 2 6/29/20 and 11/25/20.  Management per [facility] Ms. Lillie Goins PA-C.  Next appt 8/23 - Overdue, risks of stopping Androgel discussed.  On Androgel since 5/30/22 - using QOD AMA due to running low on Rx - pt will call [facility] for refill, stopped Androgel 7/23 due to feeling better.  Low libido resolved. + ED.          Asthma - Management per Allergist Dr. Chapin - next appt 7/23 / PRN.  Triggered by allergies.  Uses Albuterol HFA PRN - last used 6/23;  and AirDuo 113/14 1 puff BID PRN - last used 6/23.  Last use 3/20.  No ICS previously.           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  Never followed c Rheum.       H/O Colon Polyps - Last Colonoscopy 12/8/23 per [facility] Dr. Hinton - Next due in 5 years 12/8/28.       Reviewed:  Labs 4/15/24, [facility] 2/20/23, Allergist 6/6/23     Sees [facility].    Sees Dentist q6 months.    Sees Optho q2 years.      The following portions of the patient's history were reviewed and updated as appropriate: allergies, current medications, past family history, past medical history, past social history, past surgical history and problem list.      Review of Systems   Constitutional:  Negative for appetite change, chills, diaphoresis, fatigue and fever.   Respiratory:  Negative for chest tightness and shortness of breath.    Cardiovascular:  Negative for chest pain.  "  Gastrointestinal:  Negative for abdominal pain, blood in stool, diarrhea, nausea and vomiting.   Genitourinary:  Negative for dysuria.        Denies nocturia        Current Outpatient Medications   Medication Sig Dispense Refill   • atorvastatin (LIPITOR) 20 mg tablet Take 1 tablet (20 mg total) by mouth daily at bedtime 90 tablet 2   • cholecalciferol (VITAMIN D3) 1,000 units tablet Take 1,000 Units by mouth daily     • Cyanocobalamin (B-12) 1000 MCG TABS Take 1 tablet by mouth in the morning     • DULoxetine (CYMBALTA) 30 mg delayed release capsule Take 1 capsule (30 mg total) by mouth daily Take 30mg and 60mg for a total of 90mg daily. 90 capsule 2   • DULoxetine (CYMBALTA) 60 mg delayed release capsule Take 1 capsule (60 mg total) by mouth daily Take 30mg and 60mg for a total of 90mg daily. 90 capsule 2   • fenofibrate 160 MG tablet Take 1 tablet (160 mg total) by mouth daily 90 tablet 2   • lisinopril (ZESTRIL) 30 mg tablet Take 1 tablet (30 mg total) by mouth daily 90 tablet 2   • Magnesium 400 MG TABS Take 1 tablet by mouth in the morning     • QUEtiapine (SEROquel) 50 mg tablet Take 1 tablet (50 mg total) by mouth daily at bedtime 30 tablet 1   • albuterol (PROVENTIL HFA,VENTOLIN HFA) 90 mcg/act inhaler Inhale 2 puffs every 4 (four) hours as needed for wheezing or shortness of breath (Patient not taking: Reported on 4/16/2024) 18 g 0   • fluticasone-salmeterol (AirDuo RespiClick 113/14) 113-14 mcg/act dry powder inhaler Inhale 1 puff every 12 (twelve) hours Rinse mouth after use. (Patient not taking: Reported on 4/16/2024) 1 each 2     No current facility-administered medications for this visit.       Objective:  /72   Pulse 95   Temp 98 °F (36.7 °C)   Resp 16   Ht 5' 7.25\" (1.708 m)   Wt 101 kg (222 lb 6.4 oz)   SpO2 97%   BMI 34.57 kg/m²    Wt Readings from Last 3 Encounters:   04/16/24 101 kg (222 lb 6.4 oz)   12/08/23 98.4 kg (217 lb)   10/30/23 104 kg (228 lb 9.6 oz)      BP Readings from " Last 3 Encounters:   04/16/24 122/72   12/08/23 123/77   10/30/23 128/80          Physical Exam  Vitals and nursing note reviewed.   Constitutional:       Appearance: Normal appearance. He is well-developed. He is obese.   HENT:      Head: Normocephalic and atraumatic.      Right Ear: Tympanic membrane, ear canal and external ear normal.      Left Ear: External ear normal. There is impacted cerumen.      Nose: Congestion (Right nare) present.      Right Sinus: No maxillary sinus tenderness or frontal sinus tenderness.      Left Sinus: No maxillary sinus tenderness or frontal sinus tenderness.      Mouth/Throat:      Mouth: Mucous membranes are moist.      Pharynx: Oropharynx is clear. Uvula midline. No oropharyngeal exudate or posterior oropharyngeal erythema.      Tonsils: No tonsillar exudate.   Eyes:      Extraocular Movements: Extraocular movements intact.      Conjunctiva/sclera: Conjunctivae normal.      Pupils: Pupils are equal, round, and reactive to light.   Neck:      Vascular: No carotid bruit.   Cardiovascular:      Rate and Rhythm: Normal rate and regular rhythm.      Pulses: Normal pulses.      Heart sounds: Normal heart sounds.   Pulmonary:      Effort: Pulmonary effort is normal.      Breath sounds: Normal breath sounds.   Abdominal:      General: Bowel sounds are normal. There is no distension.      Palpations: Abdomen is soft. There is no mass.      Tenderness: There is no abdominal tenderness. There is no guarding or rebound.   Musculoskeletal:         General: No swelling or tenderness.      Cervical back: Neck supple.      Right lower leg: No edema.      Left lower leg: No edema.   Lymphadenopathy:      Cervical: No cervical adenopathy.   Skin:     Findings: No rash.   Neurological:      General: No focal deficit present.      Mental Status: He is alert and oriented to person, place, and time.   Psychiatric:         Mood and Affect: Mood normal.         Behavior: Behavior normal.         Thought  "Content: Thought content normal.         Judgment: Judgment normal.         Lab Results:      Lab Results   Component Value Date    WBC 5.77 04/15/2024    HGB 15.6 04/15/2024    HCT 48.2 04/15/2024     04/15/2024    TRIG 157 (H) 04/15/2024    HDL 39 (L) 04/15/2024    LDLDIRECT 101 (H) 04/15/2024    ALT 18 04/15/2024    AST 16 04/15/2024    K 4.1 04/15/2024     04/15/2024    CREATININE 1.24 04/15/2024    BUN 22 04/15/2024    CO2 29 04/15/2024    PSA 1.3 08/17/2022    INR 1.0 10/01/2019    GLUF 85 04/15/2024    HGBA1C 5.7 (H) 04/15/2024     No results found for: \"URICACID\"  Invalid input(s): \"BASENAME\" Vitamin D    No results found.     POCT Labs                       "

## 2024-04-16 NOTE — PATIENT INSTRUCTIONS
"To Do:     Call for appt - Uro Ms. Lillie Goins PA-C.  Next appt 8/23 - Overdue  2.  Call for appt - Counseling with Aries Jenkins LCSW      Refer to the back of insurance card for counseling options.    Apps and Websites for Counseling, Anxiety/Depression, Chronic Pain, Lifestyle Change, Problem-solving, Self-Esteem, Anger Management, Coping with Uncertainty    (Prices current as of 9/5/19)    Mood Kit - Available in Apple Krysten Store for $4.99.  Supports a person's success in specific situations, includes thought , mood tracker, and journal.  Can be accessed in an unstructured way and used as an unguided self-help krysten.      2.  Moodnotes - Available in Apple Krysten Store for $4.99.  Focuses on healthier thinking habits and identifying \"traps\" in thinking.  Tracks mood over a period of time and identifies factors that influence mood.      3.  MoodMission - Available in Apple Krysten Store and Google Play for free.  Includes five \"missions\" to promote confidence in handling stressors and coping in specific situations.  The krysten personalizes its style and techniques based on when the user engages it most frequently.  In-krysten rewards are used to motivate, increase fun and self-confidence.  Helpful for patients who need improvement in mood or a decrease in anxiety and depression symptoms.      4.   What's Up - Available in Apple Krysten Store and Google Play for free.  Recognizes negative thinking patterns and methods to overcome them.  Uses helpful metaphors, a catastrophe scale, grounding techniques, and breathing exercises.  Has the ability to sync data across multiple devices and protect this information with a unique pass code.  Has the capability to be active in forums where people can discuss similar feelings and strategies that have been helpful.      5.  Moodpath - Available in Apple Krysten Store and Google Play for free.  Uses daily screenings to create a better understanding of thoughts, feelings, and emotions. "  When needed, it provides a discussion guide to talking with a medical professional based on the responses to daily screenings.  Includes over 150 psychological exercises and videos to promote and strengthen overall mental health.    6.  MindShift - Available in Apple Krysten Store and Google Play for free.  Helpful for youth and young adults in coping with anxiety.  Creates an individualized toolbox to help users deal with test anxiety, perfectionism, social anxiety, worry, panic, and conflict.  Includes directions on how to construct “belief experiments” to trial common beliefs that feed anxiety, guided relaxation, as well as tools and tips to help establish and accomplish goals.  Differentiates between helpful and unhelpful anxiety, and explains how to overcome fears by gradually facing them in manageable steps.    7.  CBT-I  - Available in Apple Krysten Store and Google Play for free.  For insomnia.  Educates about sleep, developing positive sleep routines, and improved sleep environment.  Encourages user to change behaviors which will provide confidence for better sleep on a regular basis.    8.  [employer]uk - Free website.  Provides self-help and therapy resources that encourages change to combat negative and destructive thought patterns.  Includes access to numerous handouts on a variety of symptoms related to anxiety, depression, low self-esteem, panic attacks, social disorders, and more.  The solution section has interventions that can be printed and saved for future use.    9.  Woebot - Available in Apple Krysten store and Google Play for free.  Recommended for age 17+.  Friendly self-care  that helps you think through situations with step-by-step guidance using counseling tools, learn about yourself with intelligent mood tracking, and master skills to reduce stress and live happier through 100+ evidence-based stories from clinicians.  Chat as often or as little as you'd like, whenever you'd like to.   "    10.  Wysa:  AI  CBT DBT Chatbot - Available in Apple appsstore and Google Play for free, has in-krysten purchases.  Recommended for 12+.  Psychologist support at $30/month, 50% off to start.  AI  / chatbot is free.  Uses techniques of CBT, DBT, yoga, and meditation to support your needs regarding stress, anxiety, sleep, loss, and a full range of other mental health and wellness issues.      11.  Curable Pain Relief - Available in Apple Krysten store and Google Play for free, has in-krysten purchases.   Teaches about the chronic pain cycle and how to reverse it, while retraining your brain and nervous system for long-term results.  Smart  Antonella guides user through updates in pain science to identify, target, eliminate the factors that are keeping user \"stuck\" in the pain cycle.      12.  [facility] - Available in Apple Krysten store and Google Play for a fee.  Subscription service, starting at $59/week (billed monthly).  All plans include unlimited messaging, and add live video sessions if desired.  Unlimited messaging therapy for teens ages 13-17 and special services for couples therapy.  You can change therapists or stop subscription renewal at any time.  Recommended for 13+.  User is matched with a licensed therapist in your state and communicate on your device by text, audio, and video from anywhere, at any time.  User will hear back at least once a day, 5 days per week.      Counseling services:    Go to Prepmatic to find a therapist near you. You can sort by gender, insurance, issues, etc.     [facility]   [address], [address], [place] [address] [phone]    [facility], [facility]   [address], [place] [address] [phone]    [facility], [facility], Ronna Gonzalez M.S., [address], [address]  [place] [address]  Email: [email or web address]  Phone: " [phone]    [facility] (they have equine therapy too)  [address], [address],  [place] [address]  [address], [address],  [place], [place] [id number] [phone]     [facility]   [address]; [place], [place] [address]   [phone]     [facility]  [address], [address]  [place], [place] [address]    [phone]     [facility]   [address] [place] [address]     [phone]     [facility]   [address], [place]   [phone]    [facility]  [address] [place] [address]  [phone]    [facility], [facility]   [address]   [place] [address]  [email or web address]    ANJELICA Chavez, LSW  (patients age 11-adult)   [address], [place], [place] [address]   [phone]     [facility]  [address]   [place] [address]  [phone]     Brenda Scott, BERTOW  [address], [place] [address] [phone]     [facility] (specializing in addiction)  [address] [place] [address]  [phone]    Yvette Bill  [address] [place], [place] [address]   [phone]      Chanel Gonzalez, PhyD  [address], [place], [place] [address]   [phone]      Lois Powell, MS, LPC, NCC  [address], [place] [address]  [phone]     Nela Thao, PsyD  [address] [place] [id number] [phone]    Catalina Quarles [facility]   [address], [place] [address] [phone]      Hotlines:   Please program these numbers into your phone in case you or someone you know needs them. All services are free.     988  People who call, text, or chat with 188 will be directly connected to the National Suicide Prevention Lifeline. The  existing Lifeline phone number (1-790.365.9341) will remain available. Callers can also connect with the [facility] Crisis Line or assistance in Korean. 074 can be used by anyone, any time, at no cost. Trained crisis response professionals can support individuals considering suicide, self-harm, or any behavioral, substance abuse or misuse or mental health need for themselves or people looking for help for a loved one experiencing a mental health crisis. Lifeline services are available 24 hours a day, seven days a week at no cost to the caller.  Crisis Text Line: text 578689     You are connected to a Crisis Counselor to bring a hot moment to a cool calm through active listening and collaborative problem solving.   WarmLine:  341.736.6483 or 211-535-5813   They provide a supportive listening ear if you need it. They also can also provide information about mental health concerns and services.   Crisis Line:  [facility] [phone],  [facility] [phone], [facility]: [phone]   24/7 crisis counseling, on-site counseling and walk-in counseling services available.   National Suicide Prevention Lifeline:  1-362.750.5858.  En Espanol Nacional de Prevencion del Suicidio 1-880.274.6716   This is free, confidential, and available 24/7.   [place]: [phone]   For those facing or having survived abuse 24 hour confidential help line, emergency safe house, counseling, advocacy and education.    If you or someone your know are feeling as though you are going to hurt yourself, do not wait - GET HELP RIGHT AWAY.  Go to the closest Emergency Room, call 911, or call someone you trust, family member or friend to be with you until you can get some help.    Additional Counseling Resources:       - [facility] in [place], [place]  Phone:  [phone]  Website:  www.[email or web address]     - [facility] in [place], [place]  Phone:  [phone]  Website:   www.[email or web address]      Wellness Visit for Adults   AMBULATORY CARE:   A wellness visit  is when you see your healthcare provider to get screened for health problems. Your healthcare provider will also give you advice on how to stay healthy. Write down your questions so you remember to ask them. Ask your healthcare provider how often you should have a wellness visit.  What happens at a wellness visit:  Your healthcare provider will ask about your health, and your family history of health problems. This includes high blood pressure, heart disease, and cancer. He or she will ask if you have symptoms that concern you, if you smoke, and about your mood. You may also be asked about your intake of medicines, supplements, food, and alcohol. Any of the following may be done:  Your weight  will be checked. Your height may also be checked so your body mass index (BMI) can be calculated. Your BMI shows if you are at a healthy weight.    Your blood pressure  and heart rate will be checked. Your temperature may also be checked.    Blood and urine tests  may be done. Blood tests may be done to check your cholesterol levels. Abnormal cholesterol levels increase your risk for heart disease and stroke. You may also need a blood or urine test to check for diabetes if you are at increased risk. Urine tests may be done to look for signs of an infection or kidney disease.    A physical exam  includes checking your heartbeat and lungs with a stethoscope. Your healthcare provider may also check your skin to look for sun damage.    Screening tests  may be recommended. A screening test is done to check for diseases that may not cause symptoms. The screening tests you may need depend on your age, gender, family history, and lifestyle habits. For example, colorectal screening may be recommended if you are 50 years old or older.    Screening tests you need if you are a woman:   A Pap smear  is used to screen for cervical cancer. Pap smears are  usually done every 3 to 5 years depending on your age. You may need them more often if you have had abnormal Pap smear test results in the past. Ask your healthcare provider how often you should have a Pap smear.    A mammogram  is an x-ray of your breasts to screen for breast cancer. Experts recommend mammograms every 2 years starting at age 50 years. You may need a mammogram at age 49 years or younger if you have an increased risk for breast cancer. Talk to your healthcare provider about when you should start having mammograms and how often you need them.    Vaccines you may need:   Get an influenza vaccine  every year. The influenza vaccine protects you from the flu. Several types of viruses cause the flu. The viruses change over time, so new vaccines are made each year.    Get a tetanus-diphtheria (Td) booster vaccine  every 10 years. This vaccine protects you against tetanus and diphtheria. Tetanus is a severe infection that may cause painful muscle spasms and lockjaw. Diphtheria is a severe bacterial infection that causes a thick covering in the back of your mouth and throat.    Get a human papillomavirus (HPV) vaccine  if you are female and aged 19 to 26 or male 19 to 21 and never received it. This vaccine protects you from HPV infection. HPV is the most common infection spread by sexual contact. HPV may also cause vaginal, penile, and anal cancers.    Get a pneumococcal vaccine  if you are aged 65 years or older. The pneumococcal vaccine is an injection given to protect you from pneumococcal disease. Pneumococcal disease is an infection caused by pneumococcal bacteria. The infection may cause pneumonia, meningitis, or an ear infection.    Get a shingles vaccine  if you are 60 or older, even if you have had shingles before. The shingles vaccine is an injection to protect you from the varicella-zoster virus. This is the same virus that causes chickenpox. Shingles is a painful rash that develops in people who  had chickenpox or have been exposed to the virus.    How to eat healthy:  My Plate is a model for planning healthy meals. It shows the types and amounts of foods that should go on your plate. Fruits and vegetables make up about half of your plate, and grains and protein make up the other half. A serving of dairy is included on the side of your plate. The amount of calories and serving sizes you need depends on your age, gender, weight, and height. Examples of healthy foods are listed below:  Eat a variety of vegetables  such as dark green, red, and orange vegetables. You can also include canned vegetables low in sodium (salt) and frozen vegetables without added butter or sauces.    Eat a variety of fresh fruits , canned fruit in 100% juice, frozen fruit, and dried fruit.    Include whole grains.  At least half of the grains you eat should be whole grains. Examples include whole-wheat bread, wheat pasta, brown rice, and whole-grain cereals such as oatmeal.    Eat a variety of protein foods such as seafood (fish and shellfish), lean meat, and poultry without skin (turkey and chicken). Examples of lean meats include pork leg, shoulder, or tenderloin, and beef round, sirloin, tenderloin, and extra lean ground beef. Other protein foods include eggs and egg substitutes, beans, peas, soy products, nuts, and seeds.    Choose low-fat dairy products such as skim or 1% milk or low-fat yogurt, cheese, and cottage cheese.    Limit unhealthy fats  such as butter, hard margarine, and shortening.       Exercise:  Exercise at least 30 minutes per day on most days of the week. Some examples of exercise include walking, biking, dancing, and swimming. You can also fit in more physical activity by taking the stairs instead of the elevator or parking farther away from stores. Include muscle strengthening activities 2 days each week. Regular exercise provides many health benefits. It helps you manage your weight, and decreases your risk  for type 2 diabetes, heart disease, stroke, and high blood pressure. Exercise can also help improve your mood. Ask your healthcare provider about the best exercise plan for you.       General health and safety guidelines:   Do not smoke.  Nicotine and other chemicals in cigarettes and cigars can cause lung damage. Ask your healthcare provider for information if you currently smoke and need help to quit. E-cigarettes or smokeless tobacco still contain nicotine. Talk to your healthcare provider before you use these products.    Limit alcohol.  A drink of alcohol is 12 ounces of beer, 5 ounces of wine, or 1½ ounces of liquor.    Lose weight, if needed.  Being overweight increases your risk of certain health conditions. These include heart disease, high blood pressure, type 2 diabetes, and certain types of cancer.    Protect your skin.  Do not sunbathe or use tanning beds. Use sunscreen with a SPF 15 or higher. Apply sunscreen at least 15 minutes before you go outside. Reapply sunscreen every 2 hours. Wear protective clothing, hats, and sunglasses when you are outside.    Drive safely.  Always wear your seatbelt. Make sure everyone in your car wears a seatbelt. A seatbelt can save your life if you are in an accident. Do not use your cell phone when you are driving. This could distract you and cause an accident. Pull over if you need to make a call or send a text message.    Practice safe sex.  Use latex condoms if are sexually active and have more than one partner. Your healthcare provider may recommend screening tests for sexually transmitted infections (STIs).    Wear helmets, lifejackets, and protective gear.  Always wear a helmet when you ride a bike or motorcycle, go skiing, or play sports that could cause a head injury. Wear protective equipment when you play sports. Wear a lifejacket when you are on a boat or doing water sports.    © Copyright Merative 2023 Information is for End User's use only and may not be  sold, redistributed or otherwise used for commercial purposes.  The above information is an  only. It is not intended as medical advice for individual conditions or treatments. Talk to your doctor, nurse or pharmacist before following any medical regimen to see if it is safe and effective for you.    Obesity   AMBULATORY CARE:   Obesity  means your body mass index (BMI) is greater than 30. Your healthcare provider will use your age, height, and weight to measure your BMI.  The risks of obesity include  many health problems, including injuries or physical disability.  Diabetes (high blood sugar level)    High blood pressure or high cholesterol    Heart disease or heart failure    Stroke    Gallbladder or liver disease    Cancer of the colon, breast, prostate, liver, or kidney    Sleep apnea    Arthritis or gout    Screening  is done to check for health conditions before you have signs or symptoms. If you are 35 to 70 years old, your blood sugar level may be checked every 3 years for signs of prediabetes or diabetes. Your healthcare provider will check your blood pressure at each visit. High blood pressure can lead to a stroke or other problems. Your provider may check for signs of heart disease, cancer, or other health problems.  Seek care immediately if:   You have a severe headache, confusion, or difficulty speaking.    You have weakness on one side of your body.    You have chest pain, sweating, or shortness of breath.    Call your doctor if:   You have symptoms of gallbladder or liver disease, such as pain in your upper abdomen.    You have knee or hip pain and discomfort while walking.    You have symptoms of diabetes, such as intense hunger and thirst, and frequent urination.    You have symptoms of sleep apnea, such as snoring or daytime sleepiness.    You have questions or concerns about your condition or care.    Treatment for obesity  focuses on helping you lose weight to improve your health.  Even a small decrease in BMI can reduce the risk for many health problems. Your healthcare provider will help you set a weight-loss goal.  Lifestyle changes  are the first step in treating obesity. These include making healthy food choices and getting regular physical activity. Your healthcare provider may suggest a weight-loss program that involves coaching, education, and therapy.    Medicine  may help you lose weight when it is used with a healthy foods and physical activity.    Surgery  can help you lose weight if you have obesity along with other health problems. Several types of weight-loss surgery are available. Ask your healthcare provider for more information.    Tips for safe weight loss:   Set small, realistic goals.  An example of a small goal is to walk for 20 minutes 5 days a week. Anther goal is to lose 5% of your body weight.    Ask for support.  Tell friends, family members, and coworkers about your goals. Ask someone to lose weight with you. You may also want to join a weight-loss support group.    Identify foods or triggers that may cause you to overeat.  Remove tempting high-calorie foods from your home and workplace. Place a bowl of fresh fruit on your kitchen counter. If stress causes you to eat, find other ways to cope with stress. A counselor or therapist may be able to help you.    Track your daily calories and activity.  Write down what you eat and drink. Also write down how many minutes of physical activity you do each day.     Track your weekly weight.  Weigh yourself in the morning, before you eat or drink anything but after you use the bathroom. Use the same scale, in the same place, and in similar clothing each time. Only weigh yourself 1 to 2 times each week, or as directed. You may become discouraged if you weigh yourself every day.    Eating changes:  You will need to eat 500 to 1,000 fewer calories each day than you currently eat to lose 1 to 2 pounds a week. The following changes  will help you cut calories:  Eat smaller portions.  Use small plates, no larger than 9 inches in diameter. Fill your plate half full of fruits and vegetables. Measure your food using measuring cups until you know what a serving size looks like.         Eat 3 meals and 1 or 2 snacks each day.  Plan your meals in advance. Cook and eat at home most of the time. Eat slowly. Do not skip meals. Skipping meals can lead to overeating later in the day. This can make it harder for you to lose weight. Talk with a dietitian to help you make a meal plan and schedule that is right for you.    Eat fruits and vegetables at every meal.  They are low in calories and high in fiber, which makes you feel full. Do not add butter, margarine, or cream sauce to vegetables. Use herbs to season steamed vegetables.    Eat less fat and fewer fried foods.  Eat more baked or grilled chicken and fish. These protein sources are lower in calories and fat than red meat. Limit fast food. Dress your salads with olive oil and vinegar instead of bottled dressing.    Limit the amount of sugar you eat.  Do not drink sugary beverages. Limit alcohol.       Activity changes:  Physical activity is good for your body in many ways. It helps you burn calories and build strong muscles. It decreases stress and depression, and improves your mood. It can also help you sleep better. Talk to your healthcare provider before you begin an exercise program.  Exercise for at least 30 minutes 5 days a week.  Start slowly. Set aside time each day for physical activity that you enjoy and that is convenient for you. It is best to do both weight training and an activity that increases your heart rate, such as walking, bicycling, or swimming.            Find ways to be more active.  Do yard work and housecleaning. Walk up the stairs instead of using elevators. Spend your leisure time going to events that require walking, such as outdoor festivals or fairs. This extra physical  activity can help you lose weight and keep it off.       Follow up with your doctor as directed:  You may need to meet with a dietitian. Write down your questions so you remember to ask them during your visits.  © Copyright Merative 2023 Information is for End User's use only and may not be sold, redistributed or otherwise used for commercial purposes.  The above information is an  only. It is not intended as medical advice for individual conditions or treatments. Talk to your doctor, nurse or pharmacist before following any medical regimen to see if it is safe and effective for you.    Cholesterol and Your Health   AMBULATORY CARE:   Cholesterol  is a waxy, fat-like substance. Your body uses cholesterol to make hormones and new cells, and to protect nerves. Cholesterol is made by your body. It also comes from certain foods you eat, such as meat and dairy products. Your healthcare provider can help you set goals for your cholesterol levels. Your provider can help you create a plan to meet your goals.  Cholesterol level goals:  Your cholesterol level goals depend on your risk for heart disease, your age, and your other health conditions. The following are general guidelines:  Total cholesterol  includes low-density lipoprotein (LDL), high-density lipoprotein (HDL), and triglyceride levels. The total cholesterol level should be lower than 200 mg/dL and is best at about 150 mg/dL.    LDL cholesterol  is called bad cholesterol  because it forms plaque in your arteries. As plaque builds up, your arteries become narrow, and less blood flows through. When plaque decreases blood flow to your heart, you may have chest pain. If plaque completely blocks an artery that brings blood to your heart, you may have a heart attack. Plaque can break off and form blood clots. Blood clots may block arteries in your brain and cause a stroke. The level should be less than 130 mg/dL and is best at about 100 mg/dL.         HDL  cholesterol  is called good cholesterol  because it helps remove LDL cholesterol from your arteries. It does this by attaching to LDL cholesterol and carrying it to your liver. Your liver breaks down LDL cholesterol so your body can get rid of it. High levels of HDL cholesterol can help prevent a heart attack and stroke. Low levels of HDL cholesterol can increase your risk for heart disease, heart attack, and stroke. The level should be at least 40 mg/dL in males or at least 50 mg/dL in females.    Triglycerides  are a type of fat that store energy from foods you eat. High levels of triglycerides also cause plaque buildup. This can increase your risk for a heart attack or stroke. If your triglyceride level is high, your LDL cholesterol level may also be high. The level should be less than 150 mg/dL.    Any of the following can increase your risk for high cholesterol:   Smoking or drinking large amounts of alcohol    Having overweight or obesity, or not getting enough exercise    A medical condition such as hypertension (high blood pressure) or diabetes    A family history of high cholesterol    Age older than 65    What you need to know about having your cholesterol levels checked:  Adults 20 to 45 years of age should have their cholesterol levels checked every 4 to 6 years. Adults 45 years or older should have their cholesterol checked every 1 to 2 years. You may need your cholesterol checked more often, or at a younger age, if you have risk factors for heart disease. You may also need to have your cholesterol checked more often if you have other health conditions, such as diabetes. Blood tests are used to check cholesterol levels. Blood tests measure your levels of triglycerides, LDL cholesterol, and HDL cholesterol.  How healthy fats affect your cholesterol levels:  Healthy fats, also called unsaturated fats, help lower LDL cholesterol and triglyceride levels. Healthy fats include the following:  Monounsaturated  fats  are found in foods such as olive oil, canola oil, avocado, nuts, and olives.    Polyunsaturated fats,  such as omega 3 fats, are found in fish, such as salmon, trout, and tuna. They can also be found in plant foods such as flaxseed, walnuts, and soybeans.    How unhealthy fats affect your cholesterol levels:  Unhealthy fats increase LDL cholesterol and triglyceride levels. They are found in foods high in cholesterol, saturated fat, and trans fat:  Cholesterol  is found in eggs, dairy, and meat.    Saturated fat  is found in butter, cheese, ice cream, whole milk, and coconut oil. Saturated fat is also found in meat, such as sausage, hot dogs, and bologna.    Trans fat  is found in liquid oils and is used in fried and baked foods. Foods that contain trans fats include chips, crackers, muffins, sweet rolls, microwave popcorn, and cookies.    Treatment  for high cholesterol will also decrease your risk of heart disease, heart attack, and stroke. Treatment may include any of the following:  Lifestyle changes  may include food, exercise, weight loss, and quitting smoking. You may also need to decrease the amount of alcohol you drink. Your healthcare provider will want you to start with lifestyle changes. Other treatment may be added if lifestyle changes are not enough. Your healthcare provider may recommend you work with a team to manage hyperlipidemia. The team may include medical experts such as a dietitian, an exercise or physical therapist, and a behavior therapist. Your family members may be included in helping you create lifestyle changes.    Medicines  may be given to lower your LDL cholesterol, triglyceride levels, or total cholesterol level. You may need medicines to lower your cholesterol if any of the following is true:    You have a history of stroke, TIA, unstable angina, or a heart attack.    Your LDL cholesterol level is 190 mg/dL or higher.    You are age 40 to 75 years, have diabetes or heart  disease risk factors, and your LDL cholesterol is 70 mg/dL or higher.    Supplements  include fish oil, red yeast rice, and garlic. Fish oil may help lower your triglyceride and LDL cholesterol levels. It may also increase your HDL cholesterol level. Red yeast rice may help decrease your total cholesterol level and LDL cholesterol level. Garlic may help lower your total cholesterol level. Do not take any supplements without talking to your healthcare provider.    Food changes you can make to lower your cholesterol levels:  A dietitian can help you create a healthy eating plan. Your dietitian can show you how to read food labels and choose foods low in saturated fat, trans fats, and cholesterol.     Decrease the total amount of fat you eat.  Choose lean meats, fat-free or 1% fat milk, and low-fat dairy products, such as yogurt and cheese. Try to limit or avoid red meats. Limit or do not eat fried foods or baked goods, such as cookies.    Replace unhealthy fats with healthy fats.  Cook foods in olive oil or canola oil. Choose soft margarines that are low in saturated fat and trans fat. Seeds, nuts, and avocados are other examples of healthy fats.    Eat foods with omega-3 fats.  Examples include salmon, tuna, mackerel, walnuts, and flaxseed. Eat fish 2 times per week. Pregnant women should not eat fish that have high levels of mercury, such as shark, swordfish, and tylor mackerel.         Increase the amount of high-fiber foods you eat.  High-fiber foods can help lower your LDL cholesterol. Aim to get between 20 and 30 grams of fiber each day. Fruits and vegetables are high in fiber. Eat at least 5 servings each day. Other high-fiber foods are whole-grain or whole-wheat breads, pastas, or cereals, and brown rice. Eat 3 ounces of whole-grain foods each day. Increase fiber slowly. You may have abdominal discomfort, bloating, and gas if you add fiber to your diet too quickly.         Eat healthy protein foods.  Examples  include low-fat dairy products, skinless chicken and turkey, fish, and nuts.    Limit foods and drinks that are high in sugar.  Your dietitian or healthcare provider can help you create daily limits for high-sugar foods and drinks. The limit may be lower if you have diabetes or another health condition. Limits can also help you lose weight if needed.  Lifestyle changes you can make to lower your cholesterol levels:   Maintain a healthy weight.  Ask your healthcare provider what a healthy weight is for you. Ask your provider to help you create a weight loss plan if needed. Weight loss can decrease your total cholesterol and triglyceride levels. Weight loss may also help keep your blood pressure at a healthy level.    Be physically active throughout the day.  Physical activity, such as exercise, can help lower your total cholesterol level and maintain a healthy weight. Physical activity can also help increase your HDL cholesterol level. Work with your healthcare provider to create an program that is right for you. Get at least 30 to 40 minutes of moderate physical activity most days of the week. Examples of exercise include brisk walking, swimming, or biking. Also include strength training at least 2 times each week. Your healthcare providers can help you create a physical activity plan.            Do not smoke.  Nicotine and other chemicals in cigarettes and cigars can raise your cholesterol levels. Ask your healthcare provider for information if you currently smoke and need help to quit. E-cigarettes or smokeless tobacco still contain nicotine. Talk to your healthcare provider before you use these products.         Limit or do not drink alcohol.  Alcohol can increase your triglyceride levels. Ask your healthcare provider before you drink alcohol. Ask how much is okay for you to drink in 24 hours or 1 week.    Follow up with your doctor as directed:  Write down your questions so you remember to ask them during your  visits.  © Copyright [employer] 2023 Information is for End User's use only and may not be sold, redistributed or otherwise used for commercial purposes.  The above information is an  only. It is not intended as medical advice for individual conditions or treatments. Talk to your doctor, nurse or pharmacist before following any medical regimen to see if it is safe and effective for you.

## 2024-04-17 NOTE — ASSESSMENT & PLAN NOTE
Improved.  Patient declines Metformin XR 500mg 3 pills QD.  Insurance would not cover pre-DM education.  Recommend lifestyle modifications.  To consider short-acting Metformin in future vs. GLP-1 agonist in future PRN?

## 2024-04-17 NOTE — ASSESSMENT & PLAN NOTE
Unstable due to zora.  Increase Seroquel 50mg QHS.    Continue Cymbalta 90mg daily.  Patient is considering counseling.  Smart phone nathaniel list and counseling list provided previously.

## 2024-04-17 NOTE — ASSESSMENT & PLAN NOTE
Management per asthma/allergy.  Stable.  Continue Singular 10 mg 1 pill at bedtime PRN and AirDuo 113/14 mcg 1 puff twice daily PRN, and Albuterol HFA PRN.

## 2024-05-06 ENCOUNTER — PATIENT MESSAGE (OUTPATIENT)
Dept: FAMILY MEDICINE CLINIC | Facility: CLINIC | Age: 64
End: 2024-05-06

## 2024-05-06 DIAGNOSIS — F31.81 BIPOLAR 2 DISORDER (HCC): ICD-10-CM

## 2024-05-06 DIAGNOSIS — F41.1 GENERALIZED ANXIETY DISORDER: Primary | ICD-10-CM

## 2024-05-08 ENCOUNTER — PATIENT MESSAGE (OUTPATIENT)
Dept: FAMILY MEDICINE CLINIC | Facility: CLINIC | Age: 64
End: 2024-05-08

## 2024-05-08 DIAGNOSIS — F31.81 BIPOLAR 2 DISORDER (HCC): Primary | ICD-10-CM

## 2024-05-08 DIAGNOSIS — F41.1 GENERALIZED ANXIETY DISORDER: ICD-10-CM

## 2024-05-09 ENCOUNTER — PATIENT MESSAGE (OUTPATIENT)
Dept: FAMILY MEDICINE CLINIC | Facility: CLINIC | Age: 64
End: 2024-05-09

## 2024-05-09 ENCOUNTER — TELEPHONE (OUTPATIENT)
Dept: PSYCHIATRY | Facility: CLINIC | Age: 64
End: 2024-05-09

## 2024-05-09 RX ORDER — QUETIAPINE FUMARATE 100 MG/1
100 TABLET, FILM COATED ORAL
Start: 2024-05-09

## 2024-05-09 RX ORDER — DULOXETIN HYDROCHLORIDE 60 MG/1
60 CAPSULE, DELAYED RELEASE ORAL DAILY
Status: SHIPPED
Start: 2024-05-09

## 2024-05-09 NOTE — TELEPHONE ENCOUNTER
Patient has been added to the Medication Management wait list with a referral.    Insurance: BC  Insurance Type:    Commercial [x]   Medicaid []   [facility] (if applicable)   Medicare []  Location Preference: [facility]  Provider Preference: any  Virtual: Yes [] No [x]  Were outside resources sent: Yes [] No [x]    Needs BiPolar

## 2024-06-03 ENCOUNTER — TELEPHONE (OUTPATIENT)
Dept: PSYCHIATRY | Facility: CLINIC | Age: 64
End: 2024-06-03

## 2024-06-03 NOTE — TELEPHONE ENCOUNTER
Patient called and stated he had an a virtual visit today 6/3/2024 at 3pm and noone showed for his appt

## 2024-06-12 ENCOUNTER — TELEPHONE (OUTPATIENT)
Dept: FAMILY MEDICINE CLINIC | Facility: CLINIC | Age: 64
End: 2024-06-12

## 2024-06-12 NOTE — TELEPHONE ENCOUNTER
Please call patient to cancel appt Mon 6/17 at 3:40pm as he is under the care of a Psychiatrist.   -     Return in about 6 weeks (around 5/28/2024) for F/U BPD, Labs;     Instead, please schedule -     Return in about 8/16/24 4mo - IFG, HTN, HL, Anx, BPD, Low T, RUKHSANA, Obesity, Labs.

## 2024-06-13 DIAGNOSIS — Z00.6 ENCOUNTER FOR EXAMINATION FOR NORMAL COMPARISON OR CONTROL IN CLINICAL RESEARCH PROGRAM: ICD-10-CM

## 2024-06-19 DIAGNOSIS — I10 BENIGN ESSENTIAL HYPERTENSION: ICD-10-CM

## 2024-06-19 RX ORDER — LISINOPRIL 30 MG/1
30 TABLET ORAL DAILY
Qty: 90 TABLET | Refills: 1 | Status: SHIPPED | OUTPATIENT
Start: 2024-06-19

## 2024-07-01 ENCOUNTER — TELEMEDICINE (OUTPATIENT)
Dept: BEHAVIORAL/MENTAL HEALTH CLINIC | Facility: CLINIC | Age: 64
End: 2024-07-01
Payer: COMMERCIAL

## 2024-07-01 DIAGNOSIS — F31.81 BIPOLAR 2 DISORDER (HCC): Primary | ICD-10-CM

## 2024-07-01 PROCEDURE — 90791 PSYCH DIAGNOSTIC EVALUATION: CPT | Performed by: SOCIAL WORKER
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
## 2024-07-08 ENCOUNTER — SOCIAL WORK (OUTPATIENT)
Dept: BEHAVIORAL/MENTAL HEALTH CLINIC | Facility: CLINIC | Age: 64
End: 2024-07-08
Payer: COMMERCIAL

## 2024-07-08 DIAGNOSIS — F32.9 REACTIVE DEPRESSION: Primary | ICD-10-CM

## 2024-07-08 DIAGNOSIS — F43.10 POST TRAUMATIC STRESS DISORDER (PTSD): ICD-10-CM

## 2024-07-08 PROCEDURE — 90834 PSYTX W PT 45 MINUTES: CPT | Performed by: SOCIAL WORKER

## 2024-07-09 PROBLEM — F32.9 REACTIVE DEPRESSION: Status: ACTIVE | Noted: 2024-07-09

## 2024-07-09 PROBLEM — F43.10 POST TRAUMATIC STRESS DISORDER (PTSD): Status: ACTIVE | Noted: 2024-07-09
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
## 2024-07-09 NOTE — BH TREATMENT PLAN
Outpatient Behavioral Health Psychotherapy Treatment Plan    Darian Juárez Jr.  1960     Date of Initial Psychotherapy Assessment: 7/1/2024  Date of Current Treatment Plan: 07/09/24  Treatment Plan Target Date: 1/8/25  Treatment Plan Expiration Date: 1/8/25    Diagnosis:   1. Reactive depression        2. Post traumatic stress disorder (PTSD)            Area(s) of Need: Anger, depressive episodes sometimes last several days, difficulty with communication, struggle with emotional regulation.     Long Term Goal 1 (in the client's own words): work to resolve depressive concerns.     Stage of Change: Action    Target Date for completion: 1/8/25     Anticipated therapeutic modalities: CBT, Solution focused, Strength based.     People identified to complete this goal: Pt and this writer, Aries Jenkins      Objective 1: (identify the means of measuring success in meeting the objective): Identify triggers of depression.       Objective 2: (identify the means of measuring success in meeting the objective): Demonstrate behaviors and thoughts which counter depression- show reduction in depression accordingly.        Long Term Goal 2 (in the client's own words): Help identify how pt can stay in window of tolerance, with understanding of triggers and what to do to maintain emotional regulation.     Stage of Change: Action    Target Date for completion: 1/8/25    Anticipated therapeutic modalities: CBT and DBT     People identified to complete this goal: Pt and this writer, Aries Jenkins LCSW      Objective 1: (identify the means of measuring success in meeting the objective): Pt has understanding of triggers and can verbalize them.       Objective 2: (identify the means of measuring success in meeting the objective): Pt can identify when he is moving into hypoarousal or hyperarousal and get himself back into window of tolerance using grounding techniques and more helpful cognitive processes.       Long Term Goal  3 (in the client's own words): Work to resolve anger problems.     Stage of Change: Action    Target Date for completion: 1/8/25    Objective 1: (identify the means of measuring success in meeting the objective): Identify sources of anger.        Objective 2: (identify the means of measuring success in meeting the objective): Verbalize and demonstrate evidence in how to de-escalate anger. Identify early warning indicators of anger and verbalize and show evidence of how to contain anger and not allow it to carry over into other areas of life, unrelated to initial trigger.        I am currently under the care of a [facility] psychiatric provider: no    My [facility] psychiatric provider is: NA    I am currently taking psychiatric medications: Yes, as prescribed    I feel that I will be ready for discharge from mental health care when I reach the following (measurable goal/objective): Emotions are better regulated, anger episodes are diminished, pt is spending less time in hypoarousal and depressed state following a trigger as self reported.     For children and adults who have a legal guardian:   Has there been any change to custody orders and/or guardianship status? NA. If yes, attach updated documentation.    I have created my Crisis Plan and have been offered a copy of this plan    Behavioral Health Treatment Plan [facility]: Diagnosis and Treatment Plan explained to Darian Juárez Jr. Darian Juárez Jr. acknowledges an understanding of their diagnosis. Darian Juárez Jr. agrees to this treatment plan.    I have been offered a copy of this Treatment Plan. yes

## 2024-07-26 ENCOUNTER — TELEPHONE (OUTPATIENT)
Age: 64
End: 2024-07-26

## 2024-07-26 NOTE — TELEPHONE ENCOUNTER
"Behavioral Health Outpatient Intake Questions    Referred By   : PCP     Please advise interviewee that they need to answer all questions truthfully to allow for best care, and any misrepresentations of information may affect their ability to be seen at this clinic   => Was this discussed? Yes     If Minor Child (under age 18)    Who is/are the legal guardian(s) of the child?     Is there a custody agreement? No     If \"YES\"- Custody orders must be obtained prior to scheduling the first appointment  In addition, Consent to Treatment must be signed by all legal guardians prior to scheduling the first appointment    If \"NO\"- Consent to Treatment must be signed by all legal guardians prior to scheduling the first appointment    Behavioral Health Outpatient Intake History -     Presenting Problem (in patient's own words):     Bi-polar     Are there any communication barriers for this patient?     No                                               If yes, please describe barriers:  NONE   If there is a unique situation, please refer to Reggie Rivera/Michelle Moody for final determination.    Are you taking any psychiatric medications? Yes     If \"YES\" -What are they Zoloft     If \"YES\" -Who prescribes? [facility]     Has the Patient previously received outpatient Talk Therapy or Medication Management from [facility]  Yes        If \"YES\"- When, Where and with Whom? [facility]         If \"NO\" -Has Patient received these services elsewhere?       If \"YES\" -When, Where, and with Whom?    Has the Patient abused alcohol or other substances in the last 6 months ? No  No concerns of substance abuse are reported.     If \"YES\" -What substance, How much, How often?     If illegal substance: Refer to [facility] (for LENI) or [facility].   If Alcohol in excess of 10 drinks per week:  Refer to [facility] (for LENI) or [facility]    Legal History-     Is this treatment court ordered? No   If \"yes " "\"send to :  Talk Therapy : Send to Reggie Rivera/Michelle Moody for final determination   Med Management: Send to Dr Goldstein for final determination     Has the Patient been convicted of a felony?  No   If \"Yes\" send to -When, What?  Talk Therapy: Send to Reggie Rivera/Michelle Moody for final determination   Med Management: Send to Dr Goldstein for final determination     ACCEPTED as a patient Yes  If \"Yes\" Appointment Date: 10/15/2024 [facility]     Referred Elsewhere? No  If “Yes” - (Where? Ex: [facility], [facility], [facility], [facility], etc.)       Name of Insurance Co:[employer]   Insurance ID#[id number]  Insurance Phone #  If ins is primary or secondary?Primary   If patient is a minor, parents information such as Name, D.O.B of guarantor.  "

## 2024-08-05 ENCOUNTER — SOCIAL WORK (OUTPATIENT)
Dept: BEHAVIORAL/MENTAL HEALTH CLINIC | Facility: CLINIC | Age: 64
End: 2024-08-05
Payer: COMMERCIAL

## 2024-08-05 DIAGNOSIS — F32.9 REACTIVE DEPRESSION: Primary | ICD-10-CM

## 2024-08-05 DIAGNOSIS — F43.10 POST TRAUMATIC STRESS DISORDER (PTSD): ICD-10-CM

## 2024-08-05 PROCEDURE — 90834 PSYTX W PT 45 MINUTES: CPT | Performed by: SOCIAL WORKER

## 2024-08-05 NOTE — PSYCH
1. Reactive depression        2. Post traumatic stress disorder (PTSD)          Data: Pt fits into many of the characteristics of someone who has been through childhood neglect, and ACE's. For this reason, pt and writer are working on helping pt understand how trauma impacts the brain, fear of rejection, avoidant behaviors and passive aggressive tendencies.     This session focused on triggers and how pt can identify what is going on underneath the trigger which for pt is always related to shame and loss of dignity. Pt and writer discussed how pt can identify loss of dignity and recognize what he needs to do to avoid a hypo arousal state. Pt identifies he needs to take some time out when he is triggered so her can gather his thoughts. Pt reports he will work on communicating how he is feeling, why he is hurt and how to move forward. Pt is encouraged to work with his wife to try and start to communicate more. Goal is to get the time where pt falls into hypo- arousal state to reduce from several days to only a couple of hours.     Assessment: Insight is growing. Pt is understanding his entire life much better. Pt is ready to work on improving communication, and working to make more adaptive choices to cope better and avoid self destructive patterns of behavior and in particular passive aggressive styles of communication.     Plan: follow up in 2 weeks time.       Psychotherapy Provided: Individual Psychotherapy 45 minutes     Length of time in session: 45 minutes, follow up in 2 week    Goals addressed in session: Goal 1 and Goal 2     Pain:      none    0    Current suicide risk : Low     Pt is future oriented and not suicidal.       Behavioral Health Treatment Plan [facility]: Diagnosis and Treatment Plan explained to Aldair, Aldair relates understanding diagnosis and is agreeable to Treatment Plan. Yes     Visit start and stop times:    08/05/24  Start Time: 1000  Stop Time: 1045  Total Visit Time: 45 minutes

## 2024-08-06 ENCOUNTER — APPOINTMENT (OUTPATIENT)
Dept: LAB | Facility: AMBULARY SURGERY CENTER | Age: 64
End: 2024-08-06
Payer: COMMERCIAL

## 2024-08-06 ENCOUNTER — LAB (OUTPATIENT)
Dept: LAB | Facility: AMBULARY SURGERY CENTER | Age: 64
End: 2024-08-06
Payer: COMMERCIAL

## 2024-08-06 DIAGNOSIS — I10 BENIGN ESSENTIAL HYPERTENSION: ICD-10-CM

## 2024-08-06 DIAGNOSIS — E78.1 HYPERTRIGLYCERIDEMIA: ICD-10-CM

## 2024-08-06 DIAGNOSIS — R73.01 IFG (IMPAIRED FASTING GLUCOSE): ICD-10-CM

## 2024-08-06 DIAGNOSIS — E66.9 CLASS 1 OBESITY WITH SERIOUS COMORBIDITY AND BODY MASS INDEX (BMI) OF 34.0 TO 34.9 IN ADULT, UNSPECIFIED OBESITY TYPE: ICD-10-CM

## 2024-08-06 DIAGNOSIS — Z00.6 ENCOUNTER FOR EXAMINATION FOR NORMAL COMPARISON OR CONTROL IN CLINICAL RESEARCH PROGRAM: ICD-10-CM

## 2024-08-06 DIAGNOSIS — E66.9 OBESITY (BMI 30.0-34.9): ICD-10-CM

## 2024-08-06 DIAGNOSIS — F31.81 BIPOLAR 2 DISORDER (HCC): ICD-10-CM

## 2024-08-06 DIAGNOSIS — E78.2 MIXED HYPERLIPIDEMIA: ICD-10-CM

## 2024-08-06 LAB
ALBUMIN SERPL BCG-MCNC: 4.1 G/DL (ref 3.5–5)
ALP SERPL-CCNC: 29 U/L (ref 34–104)
ALT SERPL W P-5'-P-CCNC: 20 U/L (ref 7–52)
ANION GAP SERPL CALCULATED.3IONS-SCNC: 7 MMOL/L (ref 4–13)
AST SERPL W P-5'-P-CCNC: 18 U/L (ref 13–39)
BILIRUB SERPL-MCNC: 0.61 MG/DL (ref 0.2–1)
BUN SERPL-MCNC: 19 MG/DL (ref 5–25)
CALCIUM SERPL-MCNC: 9.4 MG/DL (ref 8.4–10.2)
CHLORIDE SERPL-SCNC: 106 MMOL/L (ref 96–108)
CHOLEST SERPL-MCNC: 162 MG/DL
CO2 SERPL-SCNC: 29 MMOL/L (ref 21–32)
CREAT SERPL-MCNC: 1.23 MG/DL (ref 0.6–1.3)
EST. AVERAGE GLUCOSE BLD GHB EST-MCNC: 120 MG/DL
GFR SERPL CREATININE-BSD FRML MDRD: 62 ML/MIN/1.73SQ M
GLUCOSE P FAST SERPL-MCNC: 86 MG/DL (ref 65–99)
HBA1C MFR BLD: 5.8 %
HDLC SERPL-MCNC: 45 MG/DL
LDLC SERPL CALC-MCNC: 85 MG/DL (ref 0–100)
LDLC SERPL DIRECT ASSAY-MCNC: 111 MG/DL (ref 0–100)
MAGNESIUM SERPL-MCNC: 2.3 MG/DL (ref 1.9–2.7)
NONHDLC SERPL-MCNC: 117 MG/DL
POTASSIUM SERPL-SCNC: 4.1 MMOL/L (ref 3.5–5.3)
PROT SERPL-MCNC: 6.5 G/DL (ref 6.4–8.4)
SODIUM SERPL-SCNC: 142 MMOL/L (ref 135–147)
TRIGL SERPL-MCNC: 161 MG/DL
TSH SERPL DL<=0.05 MIU/L-ACNC: 1.93 UIU/ML (ref 0.45–4.5)

## 2024-08-06 PROCEDURE — 36415 COLL VENOUS BLD VENIPUNCTURE: CPT

## 2024-08-06 PROCEDURE — 83735 ASSAY OF MAGNESIUM: CPT

## 2024-08-06 PROCEDURE — 80053 COMPREHEN METABOLIC PANEL: CPT

## 2024-08-06 PROCEDURE — 84443 ASSAY THYROID STIM HORMONE: CPT

## 2024-08-06 PROCEDURE — 83721 ASSAY OF BLOOD LIPOPROTEIN: CPT

## 2024-08-06 PROCEDURE — 80061 LIPID PANEL: CPT

## 2024-08-06 PROCEDURE — 83036 HEMOGLOBIN GLYCOSYLATED A1C: CPT

## 2024-08-07 ENCOUNTER — OFFICE VISIT (OUTPATIENT)
Dept: FAMILY MEDICINE CLINIC | Facility: CLINIC | Age: 64
End: 2024-08-07
Payer: COMMERCIAL

## 2024-08-07 VITALS
TEMPERATURE: 98.8 F | RESPIRATION RATE: 16 BRPM | BODY MASS INDEX: 34.21 KG/M2 | WEIGHT: 218 LBS | DIASTOLIC BLOOD PRESSURE: 80 MMHG | OXYGEN SATURATION: 97 % | HEART RATE: 67 BPM | SYSTOLIC BLOOD PRESSURE: 140 MMHG | HEIGHT: 67 IN

## 2024-08-07 DIAGNOSIS — E66.9 CLASS 1 OBESITY WITH SERIOUS COMORBIDITY AND BODY MASS INDEX (BMI) OF 33.0 TO 33.9 IN ADULT, UNSPECIFIED OBESITY TYPE: ICD-10-CM

## 2024-08-07 DIAGNOSIS — E29.1 HYPOGONADISM IN MALE: ICD-10-CM

## 2024-08-07 DIAGNOSIS — G47.33 OSA ON CPAP: ICD-10-CM

## 2024-08-07 DIAGNOSIS — E78.2 MIXED HYPERLIPIDEMIA: ICD-10-CM

## 2024-08-07 DIAGNOSIS — E78.1 HYPERTRIGLYCERIDEMIA: ICD-10-CM

## 2024-08-07 DIAGNOSIS — R73.01 IFG (IMPAIRED FASTING GLUCOSE): Primary | ICD-10-CM

## 2024-08-07 DIAGNOSIS — I10 BENIGN ESSENTIAL HYPERTENSION: ICD-10-CM

## 2024-08-07 DIAGNOSIS — Z86.010 HISTORY OF COLON POLYPS: ICD-10-CM

## 2024-08-07 DIAGNOSIS — F41.1 GENERALIZED ANXIETY DISORDER: ICD-10-CM

## 2024-08-07 DIAGNOSIS — J45.909 ASTHMA DUE TO SEASONAL ALLERGIES: ICD-10-CM

## 2024-08-07 DIAGNOSIS — F31.81 BIPOLAR 2 DISORDER (HCC): ICD-10-CM

## 2024-08-07 PROCEDURE — 99214 OFFICE O/P EST MOD 30 MIN: CPT | Performed by: FAMILY MEDICINE

## 2024-08-07 RX ORDER — LISINOPRIL 30 MG/1
30 TABLET ORAL DAILY
Qty: 90 TABLET | Refills: 1 | Status: SHIPPED | OUTPATIENT
Start: 2024-08-07

## 2024-08-07 RX ORDER — ATORVASTATIN CALCIUM 20 MG/1
20 TABLET, FILM COATED ORAL
Qty: 90 TABLET | Refills: 1 | Status: SHIPPED | OUTPATIENT
Start: 2024-08-07

## 2024-08-07 RX ORDER — FENOFIBRATE 160 MG/1
160 TABLET ORAL DAILY
Qty: 90 TABLET | Refills: 1 | Status: SHIPPED | OUTPATIENT
Start: 2024-08-07

## 2024-08-07 NOTE — PROGRESS NOTES
Assessment/Plan:  Problem List Items Addressed This Visit        Cardiovascular and Mediastinum    Benign essential hypertension     Elevated BP today.  Check blood pressure outside of office.  Recommend lifestyle modifications.           Relevant Medications    lisinopril (ZESTRIL) 30 mg tablet    Other Relevant Orders    Comprehensive metabolic panel    Magnesium       Respiratory    RUKHSANA on CPAP     Continue CPAP.  Management per Sleep Medicine.           Asthma due to seasonal allergies     Management per asthma/allergy.  Stable on Albuterol HFA PRN.              Endocrine    IFG (impaired fasting glucose) - Primary     Stable.  Patient declines Metformin XR 500mg 3 pills QD.  Insurance would not cover pre-DM education.  Recommend lifestyle modifications.  To consider short-acting Metformin in future vs. GLP-1 agonist in future PRN?           Relevant Orders    Comprehensive metabolic panel    Hemoglobin A1C    Hypogonadism in male     Management per [facility] - overdue for appt.  Patient is not taking Androgel and is advised to discuss c Uro.              Behavioral Health    Generalized anxiety disorder     Management per Psych.  Continue counseling.  Smart phone nathaniel list and counseling list provided previously.         Relevant Medications    sertraline (ZOLOFT) 50 mg tablet    Bipolar 2 disorder (HCC)     Management per Psych.  Continue counseling.  Smart phone nathaniel list and counseling list provided previously.         Relevant Medications    sertraline (ZOLOFT) 50 mg tablet       Other    Mixed hyperlipidemia     Stable.  Continue Lipitor 20 mg nightly.  Recommend lifestyle modifications.           Relevant Medications    atorvastatin (LIPITOR) 20 mg tablet    fenofibrate 160 MG tablet    Other Relevant Orders    Comprehensive metabolic panel    Lipid panel    TSH, 3rd generation with Free T4 reflex    LDL cholesterol, direct    Obesity, unspecified     Stable.  Recommend lifestyle modifications.          Hypertriglyceridemia     Stable.  Continue Fenofibrate 160 mg daily.  Recommend lifestyle modifications.          Relevant Medications    atorvastatin (LIPITOR) 20 mg tablet    fenofibrate 160 MG tablet    Other Relevant Orders    Comprehensive metabolic panel    Lipid panel    TSH, 3rd generation with Free T4 reflex    LDL cholesterol, direct    History of colon polyps     Colonoscopy is up to date.                 Return in about 6 months (around 2/7/2025) for 6mo - IFG, HTN, HL, Anx, BPD, Low T, RUKHSANA, Obesity, Labs.      Future Appointments   Date Time Provider Department Center   8/19/2024 11:00 AM Aries Jenkins PSYCH [facility]   9/9/2024 10:00 AM Aries Jenkins PSYCH [facility]   10/15/2024 11:00 AM [facility] Reilly PA-C PSYCH [facility]   11/12/2024  1:00 PM [facility] Reilly PA-C PS[facility]   2/10/2025  1:40 PM Mayte Peacock DO  [facility]        Subjective:     Aldair is a 64 y.o. male who presents today for a follow-up on his chronic medical conditions.        HPI:  Chief Complaint   Patient presents with   • Follow-up     4 [facility] - patient is still seeing online psychiatrist and is seeing Niall. Patient was able to get into the [facility] for psychiatry - first appointment in October.     -- Above per clinical staff and reviewed. --      HPI      Today:      Return in about 6 weeks (around 5/28/2024) for F/U BPD, Labs; 8/16/24 4mo - IFG, HTN, HL, Anx, BPD, Low T, RUKHSANA, Obesity, Labs.     8/16/24 4mo - IFG, HTN, HL, Anx, BPD, Low T, RUKHSANA, Obesity, Labs.       4mo OV       Retired late 9/21.       Obesity - Watching diet - avoiding salt in food, but eating increased portions of healthy food, struggling with sugar intake.  Regular exercise - Walking with wife 30 minutes, 7 times per week.  Working with a  twice weekly since 2/24.       IFG - He never started Metformin XR 500mg 3 pills QD because the pharmacist told him the dose was too high.  S/p  Pre-DM education - last attended 3/9/22, insurance would no longer cover.          HTN - On Lisinopril 30mg QD.  No higher dose or other HTN Rx previously.  No BP check outside of office.  He has an arm BP cuff at home.  Stable Echo 10/10/19.       Hyperlipidemia - On Lipitor 20mg QHS.  No higher doses.  Unsure of other statin name that he had tried previously - ineffective.  s/p clean cardiac cath 10/4/19.       Hypertriglyceridemia - On Fenofibrate 160mg QD.       RUKHSANA on CPAP - Uses CPAP regularly.  Management per Sleep Specialist on [address] - Dr. Earl Maza - Next appt 12/24.  Last sleep study 2017?, no weight changes in the interim.       Bipolar Disorder / Depression - Pending Psych consult coretta Reilly PA-C on 10/24.  Sees counselor Aries Jenkins LCSW q2 weeks - Next appt 8/24.  Still seeing onling counselor in interim - next appt 9/24.  Feels agitated, rapid cycling, and manic since 1/24 (3 months ago).  He denies trigger.  On Seroquel 25mg QHS.  He felt too tired on 50mg pill after taking for 2-3 weeks.  D/C Rexulti 2mg QHS due to fatigue.  Feels 90% improved.  Has more energy, no longer napping, not irritable, improved motivation.  He previously requested to decrease his Cymbalta dose from 90mg QD to 60mg QD due to decreased stress in [facility], but noticed worsening mood and Cymbalta 90mg QD was resumed 4/11/22.  D/C Lamictal due to hives.  Good social supports.  Last counseling 2018 - helpful.  No SI/HI/AH/VH.  Last individual online counseling 12/23 - not a good fit.       Anxiety - On Seroquel 25mg QHS - as felt too tired on 50mg after taking for 2-3 weeks.  D/C Rexulti 2mg QHS due to fatigue.  On increased Cymbalta 90mg QD.  He no longer feels drained by his anxiety due to increased work stressors, and is able to cope better.  Feels 90% improved.  Previously on Zoloft - ineffective.          PHQ-2/9 Depression Screening       Little interest or pleasure in doing things: 1 - several  days  Feeling down, depressed, or hopeless: 1 - several days  Trouble falling or staying asleep, or sleeping too much: 1 - several days  Feeling tired or having little energy: 1 - several days  Poor appetite or overeatin - not at all  Feeling bad about yourself - or that you are a failure or have let yourself or your family down: 1 - several days  Trouble concentrating on things, such as reading the newspaper or watching television: 1 - several days  Moving or speaking so slowly that other people could have noticed. Or the opposite - being so fidgety or restless that you have been moving around a lot more than usual: 2 - more than half the days  Thoughts that you would be better off dead, or of hurting yourself in some way: 0 - not at all  PHQ-2 Score: 2  PHQ-2 Interpretation: Negative depression screen  PHQ-9 Score: 8  PHQ-9 Interpretation: Mild depression                VENANCIO-7 Flowsheet Screening    Flowsheet Row Most Recent Value   Over the last 2 weeks, how often have you been bothered by any of the following problems?     Feeling nervous, anxious, or on edge 1   Not being able to stop or control worrying 0   Worrying too much about different things 1   Trouble relaxing 1   Being so restless that it is hard to sit still 0   Becoming easily annoyed or irritable 2   Feeling afraid as if something awful might happen 0   VENANCIO-7 Total Score 5          PHQ-2/9 Depression Screening    Little interest or pleasure in doing things: 0 - not at all  Feeling down, depressed, or hopeless: 0 - not at all  Trouble falling or staying asleep, or sleeping too much: 0 - not at all  Feeling tired or having little energy: 0 - not at all  Poor appetite or overeatin - not at all  Feeling bad about yourself - or that you are a failure or have let yourself or your family down: 0 - not at all  Trouble concentrating on things, such as reading the newspaper or watching television: 0 - not at all  Moving or speaking so slowly that  other people could have noticed. Or the opposite - being so fidgety or restless that you have been moving around a lot more than usual: 0 - not at all  Thoughts that you would be better off dead, or of hurting yourself in some way: 0 - not at all  PHQ-9 Score: 0  PHQ-9 Interpretation: No or Minimal depression       VENANCIO-7 Flowsheet Screening    Flowsheet Row Most Recent Value   Over the last two weeks, how often have you been bothered by the following problems?     Feeling nervous, anxious, or on edge 0   Not being able to stop or control worrying 0   Worrying too much about different things 0   Trouble relaxing  0   Being so restless that it's hard to sit still 0   Becoming easily annoyed or irritable  1   Feeling afraid as if something awful might happen 0   How difficult have these problems made it for you to do your work, take care of things at home, or get along with other people?  Somewhat difficult   VENANCIO Score  1                 MDQ:  13, Synchronous, Minor Problem / 4 Asynchronous No Problem        Low Testosterone - x 2 6/29/20 and 11/25/20.  Management per [facility] Ms. Lillie Goins PA-C.  Next appt 8/23 - Overdue, risks of stopping Androgel discussed.  On Androgel since 5/30/22 - using QOD AMA due to running low on Rx - pt will call [facility] for refill, stopped Androgel 7/23 due to feeling better.  Low libido resolved. + ED.          Asthma - Management per Allergist Dr. Chapin - next appt 7/25.  Triggered by allergies.  On Advair 250/50 1 puff BID until late 7/24.  Uses Albuterol HFA PRN - last used 7/24.  No ICS previously.           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  Never followed c Rheum.       H/O Colon Polyps - Last Colonoscopy 12/8/23 per [facility] Dr. Hinton - Next due in 5 years 12/8/28.       Reviewed:  Labs 8/6/24, Uro 2/20/23, Allergist 7/9/24     Sees Uro.      The following portions of the patient's history were reviewed and updated as appropriate: allergies, current medications, past family  "history, past medical history, past social history, past surgical history and problem list.      Review of Systems   Constitutional:  Negative for appetite change, chills, diaphoresis, fatigue and fever.   Respiratory:  Negative for cough, chest tightness, shortness of breath and wheezing.    Cardiovascular:  Negative for chest pain.   Gastrointestinal:  Negative for abdominal pain, blood in stool, diarrhea, nausea and vomiting.   Genitourinary:  Negative for dysuria.        Current Outpatient Medications   Medication Sig Dispense Refill   • albuterol (ProAir HFA) 90 mcg/act inhaler Inhale 2 puffs every 6 (six) hours as needed for wheezing 8.5 g 0   • atorvastatin (LIPITOR) 20 mg tablet Take 1 tablet (20 mg total) by mouth daily at bedtime 90 tablet 1   • cholecalciferol (VITAMIN D3) 1,000 units tablet Take 1,000 Units by mouth daily     • Cyanocobalamin (B-12) 1000 MCG TABS Take 1 tablet by mouth in the morning     • fenofibrate 160 MG tablet Take 1 tablet (160 mg total) by mouth daily 90 tablet 1   • lisinopril (ZESTRIL) 30 mg tablet Take 1 tablet (30 mg total) by mouth daily 90 tablet 1   • Magnesium 400 MG TABS Take 1 tablet by mouth in the morning     • sertraline (ZOLOFT) 50 mg tablet Take 50 mg by mouth daily Rx per Psych       No current facility-administered medications for this visit.       Objective:  /80 (BP Location: Left arm, Patient Position: Sitting, Cuff Size: Standard)   Pulse 67   Temp 98.8 °F (37.1 °C) (Temporal)   Resp 16   Ht 5' 7.25\" (1.708 m)   Wt 98.9 kg (218 lb)   SpO2 97%   BMI 33.89 kg/m²    Wt Readings from Last 3 Encounters:   08/07/24 98.9 kg (218 lb)   07/09/24 97.5 kg (215 lb)   04/16/24 101 kg (222 lb 6.4 oz)      BP Readings from Last 3 Encounters:   08/07/24 140/80   07/09/24 150/84   04/16/24 122/72          Physical Exam  Vitals and nursing note reviewed.   Constitutional:       Appearance: Normal appearance. He is well-developed. He is obese.   HENT:      Head: " "Normocephalic and atraumatic.   Eyes:      Conjunctiva/sclera: Conjunctivae normal.   Neck:      Thyroid: No thyromegaly.   Cardiovascular:      Rate and Rhythm: Normal rate and regular rhythm.      Pulses: Normal pulses.      Heart sounds: Normal heart sounds.   Pulmonary:      Effort: Pulmonary effort is normal.      Breath sounds: Normal breath sounds.   Abdominal:      General: Bowel sounds are normal. There is no distension.      Palpations: Abdomen is soft. There is no mass.      Tenderness: There is no abdominal tenderness. There is no guarding or rebound.   Musculoskeletal:         General: No swelling or tenderness.      Cervical back: Neck supple.      Right lower leg: No edema.      Left lower leg: No edema.   Neurological:      General: No focal deficit present.      Mental Status: He is alert and oriented to person, place, and time.   Psychiatric:         Mood and Affect: Mood normal.         Behavior: Behavior normal.         Thought Content: Thought content normal.         Judgment: Judgment normal.         Lab Results:      Lab Results   Component Value Date    WBC 5.77 04/15/2024    HGB 15.6 04/15/2024    HCT 48.2 04/15/2024     04/15/2024    TRIG 161 (H) 08/06/2024    HDL 45 08/06/2024    LDLDIRECT 111 (H) 08/06/2024    ALT 20 08/06/2024    AST 18 08/06/2024    K 4.1 08/06/2024     08/06/2024    CREATININE 1.23 08/06/2024    BUN 19 08/06/2024    CO2 29 08/06/2024    PSA 1.3 08/17/2022    INR 1.0 10/01/2019    GLUF 86 08/06/2024    HGBA1C 5.8 (H) 08/06/2024     No results found for: \"URICACID\"  Invalid input(s): \"BASENAME\" Vitamin D    No results found.     POCT Labs                       "

## 2024-08-08 NOTE — ASSESSMENT & PLAN NOTE
Stable.  Patient declines Metformin XR 500mg 3 pills QD.  Insurance would not cover pre-DM education.  Recommend lifestyle modifications.  To consider short-acting Metformin in future vs. GLP-1 agonist in future PRN?

## 2024-08-08 NOTE — ASSESSMENT & PLAN NOTE
Management per Psych.  Continue counseling.  Smart phone nathaniel list and counseling list provided previously.

## 2024-08-19 ENCOUNTER — SOCIAL WORK (OUTPATIENT)
Dept: BEHAVIORAL/MENTAL HEALTH CLINIC | Facility: CLINIC | Age: 64
End: 2024-08-19
Payer: COMMERCIAL

## 2024-08-19 DIAGNOSIS — F32.9 REACTIVE DEPRESSION: Primary | ICD-10-CM

## 2024-08-19 DIAGNOSIS — F43.10 POST TRAUMATIC STRESS DISORDER (PTSD): ICD-10-CM

## 2024-08-19 DIAGNOSIS — F41.1 GENERALIZED ANXIETY DISORDER: ICD-10-CM

## 2024-08-19 PROCEDURE — 90834 PSYTX W PT 45 MINUTES: CPT | Performed by: SOCIAL WORKER

## 2024-08-19 NOTE — PSYCH
1. Reactive depression        2. Post traumatic stress disorder (PTSD)        3. Generalized anxiety disorder          Data: Pt presents with wife. Both pt and his wife are a little hostile towards each other following an argument where pt made some poor choices regarding impulsive shopping choices with good motives, and wife being very upset at pt's anger when she confronted him about what he had purchased and his inability to take responsibility for his poor choices.     Both wife and pt were able to talk in a civil way, and they each made their case. The entire situation represented a very common theme in their marriage where pt struggles to come into the light admit wrongdoing because of his own struggles with shame and loss of dignity from childhood. Pt does not believe opening up will be rewarded, so he tends to hide. Wife is a supporter of pt but wants honesty and communication. Pt was able to recognize this in this session, and pt and wife agree to work through interpersonal effectiveness module in [facility], so they can start to communicate more, and identify communication styles etc. Lastly, pt and writer discussed how his trigger is loss of power, of feeling not good enough and this causes him to try and gain back some power, or become passive aggressive. Pt recognizes this is maladaptive, but he is struggling to do the right thing. Pt is encouraged to continue to work on this, and start of small. During this session pt was able to apologize and talk more openly with wife, which is a great start.         Plan: Follow up in 2 weeks time.       Psychotherapy Provided: family Psychotherapy 45 minutes     Length of time in session: 45 minutes, follow up in 2 weeks    Goals addressed in session: Goal 1 and Goal 2     Pain:      none    0    Current suicide risk : Low     Pt is future oriented and not suicidal.       Behavioral Health Treatment Plan [facility]: Diagnosis and Treatment Plan explained to Clarissa  Clarissa relates understanding diagnosis and is agreeable to Treatment Plan. Yes     Visit start and stop times:      08/19/24  Start Time: 1100  Stop Time: 1145  Total Visit Time: 45 minutes

## 2024-08-22 LAB
APOB+LDLR+PCSK9 GENE MUT ANL BLD/T: NOT DETECTED
BRCA1+BRCA2 DEL+DUP + FULL MUT ANL BLD/T: NOT DETECTED
MLH1+MSH2+MSH6+PMS2 GN DEL+DUP+FUL M: NOT DETECTED

## 2024-09-13 ENCOUNTER — OFFICE VISIT (OUTPATIENT)
Dept: PSYCHIATRY | Facility: CLINIC | Age: 64
End: 2024-09-13
Payer: COMMERCIAL

## 2024-09-13 DIAGNOSIS — F43.10 POST TRAUMATIC STRESS DISORDER (PTSD): ICD-10-CM

## 2024-09-13 DIAGNOSIS — F31.81 BIPOLAR 2 DISORDER (HCC): Primary | ICD-10-CM

## 2024-09-13 PROCEDURE — 90792 PSYCH DIAG EVAL W/MED SRVCS: CPT | Performed by: PHYSICIAN ASSISTANT

## 2024-09-13 NOTE — ASSESSMENT & PLAN NOTE
Pt reports stable mood - continue sertraline 50 mg qd, talk therapy  Referral for psychological testing as pt also describes sx of ADHD overlapping with hypomanic sx
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
## 2024-09-23 ENCOUNTER — SOCIAL WORK (OUTPATIENT)
Dept: BEHAVIORAL/MENTAL HEALTH CLINIC | Facility: CLINIC | Age: 64
End: 2024-09-23
Payer: COMMERCIAL

## 2024-09-23 DIAGNOSIS — F32.9 REACTIVE DEPRESSION: Primary | ICD-10-CM

## 2024-09-23 PROCEDURE — 90834 PSYTX W PT 45 MINUTES: CPT | Performed by: SOCIAL WORKER

## 2024-09-23 NOTE — PSYCH
1. Reactive depression          Data: Pt and writer continued discussion on childhood trauma, complex PTSD. Pt reports he is really understanding the triggers which shut him down, or produce outbursts of anger. Pt states they are all related to feelings of shame, or loss of dignity. Pt states when he feels his wife accusing him of things, or he feels dressed down, pt either goes into anger mode, or he completely shuts down. Pt reports he has not done this since last session. When asked what pt is doing differently, pt reports he is trying to engage in more conversation with his wife. Pt is working on preventive care, talking more, collaborating more, and so far it is working. Pt has not experienced any periods of hypo and hyper arousal. Pt is even more amazed that this has happened during a high stress few weeks with death of his sister in law.     Pt also reports he and his wife are using catch phrases to let the other person know when the level of discomfort is going up, and a break needs to ensue. Pt reports this has prevented the escalation of conflict. Great progress!    Pt and this writer also discussed how people who have been through neglect and trauma are very resistant to letting their guard down. Pt is encouraged to understand how he does much better in environments where he does not feel judged, or environments where he is succeeding, in contrast to environments where he is being challenged or where he does not do well. Again, this is related to the maintaining of his dignity or the loss of his dignity. Pt is encouraged to have regular check ins with his wife to discuss what is going well, and what needs some improvement. Pt is encouraged to communicate to his wife in a way which gives her dignity, and vice versa. Pt is encouraged to try and come to next session with examples.     Assessment: Great progress on all fronts. Pt is responding well to treatment recommendations, following up with the goals  set. Pt feels less stressed, less anxious, less depression. Quality of marriage is improving. Self care is improving.     Plan: follow up in 2 weeks time.       Psychotherapy Provided: Individual Psychotherapy 45 minutes     Length of time in session: 45 minutes, follow up in 2 week    Goals addressed in session: Goal 1 and Goal 2     Pain:      none    0    Current suicide risk : Low     Pt is future oriented and not suicidal.       Behavioral Health Treatment Plan [facility]: Diagnosis and Treatment Plan explained to Aldair, Aldair relates understanding diagnosis and is agreeable to Treatment Plan. Yes     Visit start and stop times:    09/23/24  Start Time: 1000  Stop Time: 1045  Total Visit Time: 45 minutes

## 2024-10-14 ENCOUNTER — SOCIAL WORK (OUTPATIENT)
Dept: BEHAVIORAL/MENTAL HEALTH CLINIC | Facility: CLINIC | Age: 64
End: 2024-10-14
Payer: COMMERCIAL

## 2024-10-14 DIAGNOSIS — F32.9 REACTIVE DEPRESSION: Primary | ICD-10-CM

## 2024-10-14 DIAGNOSIS — F43.10 POST TRAUMATIC STRESS DISORDER (PTSD): ICD-10-CM

## 2024-10-14 PROCEDURE — 90834 PSYTX W PT 45 MINUTES: CPT | Performed by: SOCIAL WORKER
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
## 2024-11-14 ENCOUNTER — OFFICE VISIT (OUTPATIENT)
Dept: SLEEP CENTER | Facility: CLINIC | Age: 64
End: 2024-11-14
Payer: COMMERCIAL

## 2024-11-14 VITALS
WEIGHT: 224 LBS | DIASTOLIC BLOOD PRESSURE: 78 MMHG | OXYGEN SATURATION: 97 % | SYSTOLIC BLOOD PRESSURE: 114 MMHG | BODY MASS INDEX: 35.16 KG/M2 | HEART RATE: 93 BPM | HEIGHT: 67 IN

## 2024-11-14 DIAGNOSIS — J45.909 ASTHMA DUE TO SEASONAL ALLERGIES: ICD-10-CM

## 2024-11-14 DIAGNOSIS — I10 BENIGN ESSENTIAL HYPERTENSION: ICD-10-CM

## 2024-11-14 DIAGNOSIS — G47.33 OSA (OBSTRUCTIVE SLEEP APNEA): Primary | ICD-10-CM

## 2024-11-14 DIAGNOSIS — F31.81 BIPOLAR 2 DISORDER (HCC): ICD-10-CM

## 2024-11-14 DIAGNOSIS — E66.9 OBESITY (BMI 30-39.9): ICD-10-CM

## 2024-11-14 PROCEDURE — 99214 OFFICE O/P EST MOD 30 MIN: CPT | Performed by: INTERNAL MEDICINE

## 2024-11-14 NOTE — PROGRESS NOTES
Follow-Up Note - [facility]   Aldair Juárez Jr.  64 y.o. male  :1960  MRN:[id number]  DOS:2024    CC: I saw this patient for follow-up in clinic today for Sleep disordered breathing, Coexisting Sleep and Medical Problems.  Patient received ot a refurbished dream Station Version 1 machine from [employer] over a year ago.. Interval changes: None reported.      Results of prior studies: PSG in  demonstrated an AHI of 8.2/h, higher while supine and considerably higher during REM.  Minimum oxygen saturation was 83% and 3.3% of time asleep spent with saturations <90%.  During the subsequent therapeutic study, sleep disordered breathing was adequately remediated with CPAP at 15 cm H2O.    PFSH, Problem List, Medications & Allergies were reviewed in EMR.   He  has a past medical history of Anxiety, Asthma, Asthma due to seasonal allergies, Benign essential hypertension (2019), Bipolar 2 disorder (HCC) (2015), CPAP (continuous positive airway pressure) dependence, Depression, Diverticulosis, History of colon polyps, History of colon polyps (2023), Hypertension, Hypertriglyceridemia (2022), Hypogonadism in male (2022), IFG (impaired fasting glucose), Mixed hyperlipidemia (2019), Morbid obesity (HCC) (2021), Obesity, RUKHSANA on CPAP (10/14/2020), and Soledad's syndrome.    He has a current medication list which includes the following prescription(s): albuterol, atorvastatin, cholecalciferol, b-12, fenofibrate, fluticasone-salmeterol, fluticasone-salmeterol, lisinopril, magnesium, and sertraline.    PHYSIOLOGICAL DATA REVIEW : Using PAP > 4 hours/night 80%. Estimated ERASMO 2.2/hour with pressure of 14.3cm H2O@90th/95th percentile; use is limited by  .  INTERPRETATION: Compliance is Very good; Pressure setting is:within target range; ;   SUBJECTIVE: With respect to use of PAP, Aldair  is experiencing some adverse effects: dry mouth/throat.He derives benefit..  Feels  "satisfied with sleep and daytime function.   Sleep Routine: Aldair reports getting (Patient-Rptd) (P) 8 hrs sleep; he has no difficulty initiating or maintaining sleep . He arises spontaneously and feels refreshed.Aldair]denies daytime sleepiness,.  He rated himself at Total score: (Patient-Rptd) (P) 1 /24 on the Trilla Sleepiness Scale.   Other issues: None reported.     Habits: Reports that he has never smoked. He has never used smokeless tobacco.,  Reports current alcohol use of about 1.0 standard drink of alcohol per week.,  Reports no history of drug use., Caffeine use:limited until  ; Exercise routine: sometimes.      ROS: Significant for had weight reduction but regained a few pounds..  Allergies and asthma controlled.  Mood is stable on sertraline.    EXAM: /78   Pulse 93   Ht 5' 7.25\" (1.708 m)   Wt 102 kg (224 lb)   SpO2 97%   BMI 34.82 kg/m²     Wt Readings from Last 3 Encounters:   11/14/24 102 kg (224 lb)   10/08/24 95.7 kg (211 lb)   08/07/24 98.9 kg (218 lb)      Patient is well groomed; well appearing.   CNS: Alert, orientated, speech clear & coherent  Psych: cooperative and in no distress. Mental state: Appears normal.  H&N: EOMI; NC/AT: No facial pressure marks, no rashes.    Skin/Extrem: col & hydration normal; no edema  Resp: Respiratory effort is normal  Physical findings otherwise essentially unchanged from previous.    IMPRESSION: Problem List Items & Comorbidities Addressed this Visit    1. RUKHSANA (obstructive sleep apnea)        2. Benign essential hypertension        3. Asthma due to seasonal allergies        4. Bipolar 2 disorder (HCC)        5. Obesity (BMI 30-39.9)            PLAN:  I reviewed results of prior studies and physiologic data with the patient.   I discussed treatment options with risks and benefits.  Treatment with  PAP is medically necessary and Aldair is agreable to continue use.   Care of equipment, methods to improve comfort using PAP and importance of " "compliance with therapy were discussed.  Pressure setting: continue 13-16 cmH2O using a fullface mask.    Rx provided to replace supplies and Care coordinated with DME provider.   The patient's current unit has now reached its 5 yr reasonable, useful life and he he may be eligible for a new machine.  He will call for a prescription prior to his next visit.  Discussed strategies for weight reduction.    Follow-up is advised in 1 year or sooner if needed to monitor progress, compliance and to adjust therapy.     Thank you for allowing me to participate in the care of this patient.    Sincerely,     Authenticated electronically on 11/14/24   Board Certified Specialist     Portions of the record may have been created with voice recognition software. Occasional wrong word or \"sound a like\" substitutions may have occurred due to the inherent limitations of voice recognition software. There may also be notations and random deletions of words or characters from malfunctioning software. Read the chart carefully and recognize, using context, where substitutions/deletions have occurred.    "

## 2024-11-18 ENCOUNTER — SOCIAL WORK (OUTPATIENT)
Dept: BEHAVIORAL/MENTAL HEALTH CLINIC | Facility: CLINIC | Age: 64
End: 2024-11-18
Payer: COMMERCIAL

## 2024-11-18 DIAGNOSIS — F32.9 REACTIVE DEPRESSION: ICD-10-CM

## 2024-11-18 DIAGNOSIS — F43.10 POST TRAUMATIC STRESS DISORDER (PTSD): Primary | ICD-10-CM

## 2024-11-18 PROCEDURE — 90834 PSYTX W PT 45 MINUTES: CPT | Performed by: SOCIAL WORKER

## 2024-11-19 NOTE — PSYCH
1. Post traumatic stress disorder (PTSD)        2. Reactive depression              Data: Pt and writer discussed ongoing struggle with interpersonal relationships due to complex PTSD and pt being easily triggered. Pt and writer discussed value of communication with wife, and being quick to admit fault, and work on solutions to problems between them. Pt and writer discussed presentation of pride and shame, and how pt is highly motivated to avoid conversations of accountability where pt has to own up to personal failure, or scenarios which present pt is a less than favorable light. Pt also identifies this as part of his low self worth form childhood, and pt desire to keep things superficial out of self protection. Pt also recognizes this strategy does not work in marriage, and with a wife who is confrontational and solution oriented. Hence some of their struggles.     Pt and writer worked on interventions for this, and spent this session focused on improving communication to try and prevent the potential for blow ups to occur. Pt often does not include his wife on how he is feeling, or what he is planning as pt has never really learned to do this. Pt is encouraged to focus on goal of spending twenty minutes every day talking to his wife about what he is feeling and what he is planning, also asking his wife how he can support her, or what they can work on together. This will hopeful help wife to feel more secure so she does not feel the need to force conversation which leads to pt feeling like he is in trouble, and which causes wife to feel resentful towards pt.         Plan: Follow up in 2 weeks.     Psychotherapy Provided: Individual Psychotherapy 45 minutes     Length of time in session: 45 minutes, follow up in 2 weeks    Goals addressed in session: Goal 1 and Goal 2     Pain:      none    0    Current suicide risk : Low     Pt is future oriented and not suicidal.       Behavioral Health Treatment Plan [facility]:  Diagnosis and Treatment Plan explained to Clarissa Romo relates understanding diagnosis and is agreeable to Treatment Plan. Yes     Visit start and stop times:     11/18/24  Start Time: 1000  Stop Time: 1045  Total Visit Time: 45 minutes

## 2024-11-21 ENCOUNTER — TELEPHONE (OUTPATIENT)
Dept: SLEEP CENTER | Facility: CLINIC | Age: 64
End: 2024-11-21

## 2024-11-27 LAB
DME PARACHUTE DELIVERY DATE REQUESTED: NORMAL
DME PARACHUTE ITEM DESCRIPTION: NORMAL
DME PARACHUTE ORDER STATUS: NORMAL
DME PARACHUTE SUPPLIER NAME: NORMAL
DME PARACHUTE SUPPLIER PHONE: NORMAL

## 2024-12-02 ENCOUNTER — SOCIAL WORK (OUTPATIENT)
Dept: BEHAVIORAL/MENTAL HEALTH CLINIC | Facility: CLINIC | Age: 64
End: 2024-12-02
Payer: COMMERCIAL

## 2024-12-02 ENCOUNTER — OFFICE VISIT (OUTPATIENT)
Dept: PSYCHIATRY | Facility: CLINIC | Age: 64
End: 2024-12-02
Payer: COMMERCIAL

## 2024-12-02 DIAGNOSIS — F43.10 POST TRAUMATIC STRESS DISORDER (PTSD): Primary | ICD-10-CM

## 2024-12-02 DIAGNOSIS — F41.1 GENERALIZED ANXIETY DISORDER: ICD-10-CM

## 2024-12-02 DIAGNOSIS — F43.10 POST TRAUMATIC STRESS DISORDER (PTSD): ICD-10-CM

## 2024-12-02 DIAGNOSIS — F32.9 REACTIVE DEPRESSION: ICD-10-CM

## 2024-12-02 DIAGNOSIS — F31.81 BIPOLAR 2 DISORDER (HCC): Primary | ICD-10-CM

## 2024-12-02 PROCEDURE — 99214 OFFICE O/P EST MOD 30 MIN: CPT | Performed by: PHYSICIAN ASSISTANT

## 2024-12-02 PROCEDURE — 90834 PSYTX W PT 45 MINUTES: CPT | Performed by: SOCIAL WORKER

## 2024-12-02 NOTE — ASSESSMENT & PLAN NOTE
Stable - continue sertraline 50 mg qd; referral for psychological testing question bipolar II vs. depression and ADHD; continue talk therapy; f/u in 3 months

## 2024-12-02 NOTE — PSYCH
1. Post traumatic stress disorder (PTSD)        2. Reactive depression        3. Generalized anxiety disorder              Data: Pt continues to make great progress. Pt has been giving his wife 30 minutes every day, which is helping wife feel more supported, and more secure. Pt is doing the preparation work to improve communication. Pt notices he and his wife are having less crisis as they are relating better to each other and wife is feeling more secure. Pt feel his low self worth, orphan mindset from historic neglect as a child, along with avoidant tendencies made for a very toxic mess which caused problems to arise in his marriage. Plan moving forward is for pt to continue to do 30 minute conversations first thing in the morning, and then have a debrief once a week regarding things which are going well, and things which could be improved.     Pt and writer also discussed building in a meditation plan so he can start to get into a practice of seeing himself doing certain things such as making connection with his wife, talking in a calmer manner, communicating without getting angry. Pt and writer discussed how imagination and visualization is a bridge to reality.         Plan: Follow up in 2 weeks.     Psychotherapy Provided: Individual Psychotherapy 45 minutes     Length of time in session: 45 minutes, follow up in 2 weeks    Goals addressed in session: Goal 1 and Goal 2     Pain:      none    0    Current suicide risk : Low     Pt is future oriented and not suicidal.       [facility]al Health Treatment Plan  [facility]: Diagnosis and Treatment Plan explained to Clarissa Romo relates understanding diagnosis and is agreeable to Treatment Plan. Yes     Visit start and stop times:       12/02/24  Start Time: 1000  Stop Time: 1045  Total Visit Time: 45 minutes

## 2024-12-02 NOTE — PSYCH
This note was not shared with the patient due to reasonable likelihood of causing patient harm     PROGRESS NOTE        [facility]      Name and Date of Birth:  Aldair Juárez Jr. 64 y.o. 1960 MRN: [id number]    Insurance: Payor: [employer] CROSS / Plan: [facility] [employer] EPO / Product Type: Blue Fee for Service /     Date of Visit: December 2, 2024    Reason for Visit:   Chief Complaint   Patient presents with    Follow-up    Medication Management       Assessment & Plan  Bipolar 2 disorder (HCC)  Stable - continue sertraline 50 mg qd; referral for psychological testing question bipolar II vs. depression and ADHD; continue talk therapy; f/u in 3 months  Post traumatic stress disorder (PTSD)  Stable - continue sertraline 50 mg qd; referral for psychological testing question bipolar II vs. depression and ADHD; continue talk therapy; f/u in 3 months     Question bipolar II diagnosis vs. depression or other mood disorder w/ ADHD, particularly since pt is stable on sertraline 50 mg qd. Pt is happy with medications, no changes needed. Referred for psychological testing. We have discussed his safety plan and he agrees that if he experience unsafe thoughts that he will reach out to his supports including this office, the suicide hotline, and emergency services if necessary. Aldair is aware of non-emergent and emergent mental health resources. They are able to contract for their own safety at this time.    Will follow up in 3 months. Patient is aware to call the office if questions or concerns arise sooner.     Treatment Recommendations/Precautions:    Continue current medications:    - sertraline 50 mg qd    Does not want any medication changes  Aware of 24 hour and weekend coverage for urgent situations accessed by calling [facility] main practice number  Medication management every 3 months  Continue psychotherapy with [facility] therapist  Aries Jenkins  I am scheduling this patient out for greater than 3 months: Yes - Patient's stability of symptoms warrant this length of time or no significant changes to treatment plan  If sooner appointment needed, patient will reach out    Medications Risks/Benefits      Risks, Benefits And Possible Side Effects Of Medications:    Risks, benefits, and possible side effects of medications explained to Aldair and he verbalizes understanding and agreement for treatment.    Controlled Medication Discussion:     Not applicable    SUBJECTIVE:    Darian is a franklin 64 y.o. male with a history of Bipolar Disorder type II and PTSD who presents today for follow-up and medication management. Since his last visit he reports he has continued to be well. He feels the mood is still well controlled with no significant ups or downs. He has continued working with Aries for therapy and feels this has been helpful, particularly for the PTSD sx.     He denies any suicidal ideation, intent or plan at present; denies any homicidal ideation, intent or plan at present.    He denies any auditory hallucinations, denies any visual hallucinations, denies any delusions.    He denies any side effects from current psychiatric medications.    HPI ROS Appetite Changes and Sleep:     He reports adequate number of sleep hours, adequate appetite, adequate energy level    Current Rating Scores:     Current PHQ-9   PHQ-2/9 Depression Screening    Little interest or pleasure in doing things: 0 - not at all  Feeling down, depressed, or hopeless: 1 - several days  Trouble falling or staying asleep, or sleeping too much: 0 - not at all  Feeling tired or having little energy: 0 - not at all  Poor appetite or overeatin - not at all  Feeling bad about yourself - or that you are a failure or have let yourself or your family down: 1 - several days  Trouble concentrating on things, such as reading the newspaper or watching television: 0 - not at  all  Moving or speaking so slowly that other people could have noticed. Or the opposite - being so fidgety or restless that you have been moving around a lot more than usual: 0 - not at all  Thoughts that you would be better off dead, or of hurting yourself in some way: 0 - not at all  PHQ-9 Score: 2  PHQ-9 Interpretation: No or Minimal depression       Current VENANCIO-7 is   VENANCIO-7 Flowsheet Screening      Flowsheet Row Most Recent Value   Over the last two weeks, how often have you been bothered by the following problems?     Feeling nervous, anxious, or on edge 1    Not being able to stop or control worrying 0    Worrying too much about different things 0    Trouble relaxing  0    Being so restless that it's hard to sit still 0    Becoming easily annoyed or irritable  1    Feeling afraid as if something awful might happen 0    How difficult have these problems made it for you to do your work, take care of things at home, or get along with other people?  Not difficult at all    VENANCIO Score  2           Review Of Systems:    Mood euthymic   Behavior appropriate, cooperative, and calm   Thought Content normal   General normal    Personality no change in personality   Other Psych Symptoms normal   Constitutional as noted in HPI   ENT as noted in HPI   Cardiovascular as noted in HPI   Respiratory as noted in HPI   Gastrointestinal as noted in HPI   Genitourinary as noted in HPI   Musculoskeletal as noted in HPI   Integumentary as noted in HPI   Neurological as noted in HPI   Endocrine negative   Other Symptoms none, all other systems are negative     Family Psychiatric History:     Family History   Problem Relation Age of Onset    Hypertension Mother     Hyperlipidemia Mother     Anxiety disorder Mother     Bipolar disorder Mother     Osteoarthritis Mother     Coronary artery disease Mother     Hearing loss Mother     Hypertension Father     Coronary artery disease Father 75    Hyperlipidemia Father     Bipolar disorder  Father     Obesity Father     Depression Father     Heart failure Father     Kidney disease Father         CKD    COPD Father     Sleep apnea Father     Diabetes type II Father     Heart attack Father 75    Breast cancer Maternal Grandmother     Bipolar disorder Maternal Grandmother     Osteoarthritis Maternal Grandmother     Coronary artery disease Maternal Grandmother     Heart attack Maternal Grandmother     No Known Problems Brother     Aneurysm Maternal Grandfather         Brain    Obesity Paternal Grandmother     Coronary artery disease Paternal Grandfather     Heart attack Paternal Grandfather     No Known Problems Son     Heart disease Neg Hx     Asthma Neg Hx     Arrhythmia Neg Hx     Anemia Neg Hx     Clotting disorder Neg Hx     Fainting Neg Hx      Social/Substance Abuse History:    Social History     Socioeconomic History    Marital status: /Civil Union     Spouse name: Not on file    Number of children: 1    Years of education: Not on file    Highest education level: Not on file   Occupational History    Occupation: [employer]   Tobacco Use    Smoking status: Never    Smokeless tobacco: Never   Vaping Use    Vaping status: Never Used   Substance and Sexual Activity    Alcohol use: Yes     Alcohol/week: 1.0 standard drink of alcohol     Types: 1 Cans of beer per week     Comment: rarely drink    Drug use: No    Sexual activity: Yes     Partners: Female     Birth control/protection: None   Other Topics Concern    Not on file   Social History Narrative        Lives with wife    1 child - 1 son    [employer]     Social Drivers of Health     Financial Resource Strain: Not on file   Food Insecurity: Not on file   Transportation Needs: Not on file   Physical Activity: Not on file   Stress: Not on file   Social Connections: Not on file   Intimate Partner Violence: Not on file   Housing Stability: Not on file     The following portions of the patient's history were  reviewed and updated as appropriate: past family history, past medical history, past social history, past surgical history and problem list.    OBJECTIVE:     Mental Status Evaluation:  Appearance:  dressed appropriately, adequate grooming, looks stated age, bearded   Behavior:  pleasant, cooperative, calm, interacts appropriately with this writer   Speech:  normal rate, normal volume, normal pitch   Mood:  euthymic   Affect:  mood-congruent   Thought Process:  organized, logical, coherent   Associations: intact associations   Thought Content:  no overt delusions, no paranoia noted on exam   Perceptual Disturbances: no auditory hallucinations, no visual hallucinations   Risk Potential: Suicidal ideation - None  Homicidal ideation - None  Potential for aggression - No   Sensorium:  oriented to person, place, and time/date   Memory:  recent and remote memory grossly intact   Consciousness:  alert and awake   Attention/Concentration: attention span and concentration are age appropriate   Insight:  age appropriate   Judgment: age appropriate   Gait/Station: normal gait/station   Motor Activity: no abnormal movements     Laboratory Results: I have personally reviewed all pertinent laboratory/tests results    Suicide/Homicide Risk Assessment:    Risk of Harm to Self:  The following ratings are based on assessment at the time of the interview  Based on today's assessment, Aldair presents the following risk of harm to self: none    Risk of Harm to Others:  The following ratings are based on assessment at the time of the interview  Based on today's assessment, Aldair presents the following risk of harm to others: none    The following interventions are recommended: no intervention changes needed    Psychotherapy Provided:     Individual psychotherapy provided: No    Treatment Plan:    Completed and signed during the session: Not applicable - Treatment Plan to be completed by [facility]  therapist    Visit Time    Visit Start Time:  11:35 AM  Visit End Time:  11:50 AM  Total Visit Duration:  15 minutes    Alejandro Carrasco 12/02/24

## 2024-12-16 ENCOUNTER — SOCIAL WORK (OUTPATIENT)
Dept: BEHAVIORAL/MENTAL HEALTH CLINIC | Facility: CLINIC | Age: 64
End: 2024-12-16
Payer: COMMERCIAL

## 2024-12-16 DIAGNOSIS — F43.10 POST TRAUMATIC STRESS DISORDER (PTSD): Primary | ICD-10-CM

## 2024-12-16 DIAGNOSIS — F32.9 REACTIVE DEPRESSION: ICD-10-CM

## 2024-12-16 PROCEDURE — 90834 PSYTX W PT 45 MINUTES: CPT | Performed by: SOCIAL WORKER

## 2024-12-16 NOTE — PSYCH
1. Post traumatic stress disorder (PTSD)        2. Reactive depression              Data: Pt appears more expansive today. Pt reports this tends to happen every now and  then, and only lasts for a day. Wife also notices this, and has to remind pt to watch what he says. Impulsivity also tends to increase during these times. Pt reports he does not do anything risky during these outbreaks. No extra spending, no risky behaviors.     Pt reports he is following through goals for treatment. Pt is spending 20 minutes asking his wife how she is doing. Communicating more, and also opening up regarding how pt id feeling. Pt states communication has never been better. Pt does state that he and his wife can often go for three months without any major triggers or events that lead to a break down in their relationship. Pt is hoping this will not happen with his improvements in communication and his willingness to start to admit when he has fallen below the lillian. No major concerns at this point. Pt's affect is bright. Not endorsing any SI. Depression is low.    Assessment: Pt and writer to continue to work for a few more session, based on [place] goals. This writer wants to see how pt does when he has a trigger in his marriage. Other than that pt is doing well, working through goals in treatment very nicely.     Plan: Follow up in 3 weeks.     Psychotherapy Provided: Individual Psychotherapy 45 minutes     Length of time in session: 45 minutes, follow up in 3 weeks    Goals addressed in session: Goal 1 and Goal 2     Pain:      none    0    Current suicide risk : Low     Pt is future oriented and not suicidal.       Behavioral Health Treatment Plan [facility]: Diagnosis and Treatment Plan explained to Clarissa Romo relates understanding diagnosis and is agreeable to Treatment Plan. Yes     Visit start and stop times:       12/16/24  Start Time: 1000  Stop Time: 1045  Total Visit Time: 45 minutes

## 2025-02-07 ENCOUNTER — APPOINTMENT (OUTPATIENT)
Dept: LAB | Facility: CLINIC | Age: 65
End: 2025-02-07
Payer: COMMERCIAL

## 2025-02-07 ENCOUNTER — RESULTS FOLLOW-UP (OUTPATIENT)
Dept: FAMILY MEDICINE CLINIC | Facility: CLINIC | Age: 65
End: 2025-02-07

## 2025-02-07 DIAGNOSIS — R73.01 IFG (IMPAIRED FASTING GLUCOSE): ICD-10-CM

## 2025-02-07 DIAGNOSIS — E78.2 MIXED HYPERLIPIDEMIA: ICD-10-CM

## 2025-02-07 DIAGNOSIS — E78.1 HYPERTRIGLYCERIDEMIA: ICD-10-CM

## 2025-02-07 DIAGNOSIS — I10 BENIGN ESSENTIAL HYPERTENSION: ICD-10-CM

## 2025-02-07 LAB
ALBUMIN SERPL BCG-MCNC: 4.5 G/DL (ref 3.5–5)
ALP SERPL-CCNC: 31 U/L (ref 34–104)
ALT SERPL W P-5'-P-CCNC: 17 U/L (ref 7–52)
ANION GAP SERPL CALCULATED.3IONS-SCNC: 4 MMOL/L (ref 4–13)
AST SERPL W P-5'-P-CCNC: 16 U/L (ref 13–39)
BILIRUB SERPL-MCNC: 0.66 MG/DL (ref 0.2–1)
BUN SERPL-MCNC: 21 MG/DL (ref 5–25)
CALCIUM SERPL-MCNC: 10 MG/DL (ref 8.4–10.2)
CHLORIDE SERPL-SCNC: 105 MMOL/L (ref 96–108)
CHOLEST SERPL-MCNC: 192 MG/DL (ref ?–200)
CO2 SERPL-SCNC: 31 MMOL/L (ref 21–32)
CREAT SERPL-MCNC: 1.27 MG/DL (ref 0.6–1.3)
EST. AVERAGE GLUCOSE BLD GHB EST-MCNC: 123 MG/DL
GFR SERPL CREATININE-BSD FRML MDRD: 59 ML/MIN/1.73SQ M
GLUCOSE P FAST SERPL-MCNC: 93 MG/DL (ref 65–99)
HBA1C MFR BLD: 5.9 %
HDLC SERPL-MCNC: 41 MG/DL
LDLC SERPL CALC-MCNC: 108 MG/DL (ref 0–100)
LDLC SERPL DIRECT ASSAY-MCNC: 116 MG/DL (ref 0–100)
MAGNESIUM SERPL-MCNC: 2.2 MG/DL (ref 1.9–2.7)
NONHDLC SERPL-MCNC: 151 MG/DL
POTASSIUM SERPL-SCNC: 3.9 MMOL/L (ref 3.5–5.3)
PROT SERPL-MCNC: 7.2 G/DL (ref 6.4–8.4)
SODIUM SERPL-SCNC: 140 MMOL/L (ref 135–147)
TRIGL SERPL-MCNC: 215 MG/DL (ref ?–150)
TSH SERPL DL<=0.05 MIU/L-ACNC: 2.45 UIU/ML (ref 0.45–4.5)

## 2025-02-07 PROCEDURE — 83721 ASSAY OF BLOOD LIPOPROTEIN: CPT

## 2025-02-07 PROCEDURE — 83036 HEMOGLOBIN GLYCOSYLATED A1C: CPT

## 2025-02-07 PROCEDURE — 80061 LIPID PANEL: CPT

## 2025-02-07 PROCEDURE — 36415 COLL VENOUS BLD VENIPUNCTURE: CPT

## 2025-02-07 PROCEDURE — 84443 ASSAY THYROID STIM HORMONE: CPT

## 2025-02-07 PROCEDURE — 80053 COMPREHEN METABOLIC PANEL: CPT

## 2025-02-07 PROCEDURE — 83735 ASSAY OF MAGNESIUM: CPT

## 2025-02-07 NOTE — RESULT ENCOUNTER NOTE
Unstable labs - will review with patient at upcoming appointment.    Low Alk Phosphatase - Check Vitamin B6 and refer to [facility].

## 2025-02-10 ENCOUNTER — OFFICE VISIT (OUTPATIENT)
Dept: FAMILY MEDICINE CLINIC | Facility: CLINIC | Age: 65
End: 2025-02-10
Payer: COMMERCIAL

## 2025-02-10 ENCOUNTER — HOSPITAL ENCOUNTER (OUTPATIENT)
Dept: RADIOLOGY | Facility: HOSPITAL | Age: 65
Discharge: HOME | End: 2025-02-10
Payer: COMMERCIAL

## 2025-02-10 VITALS
TEMPERATURE: 98.6 F | DIASTOLIC BLOOD PRESSURE: 82 MMHG | BODY MASS INDEX: 35.94 KG/M2 | SYSTOLIC BLOOD PRESSURE: 134 MMHG | WEIGHT: 229 LBS | HEART RATE: 105 BPM | HEIGHT: 67 IN | RESPIRATION RATE: 18 BRPM | OXYGEN SATURATION: 96 %

## 2025-02-10 DIAGNOSIS — G47.33 OSA ON CPAP: ICD-10-CM

## 2025-02-10 DIAGNOSIS — R73.01 IFG (IMPAIRED FASTING GLUCOSE): Primary | ICD-10-CM

## 2025-02-10 DIAGNOSIS — E78.1 HYPERTRIGLYCERIDEMIA: ICD-10-CM

## 2025-02-10 DIAGNOSIS — Z13.6 SCREENING FOR CARDIOVASCULAR CONDITION: ICD-10-CM

## 2025-02-10 DIAGNOSIS — I10 BENIGN ESSENTIAL HYPERTENSION: ICD-10-CM

## 2025-02-10 DIAGNOSIS — R74.8 LOW SERUM ALKALINE PHOSPHATASE: ICD-10-CM

## 2025-02-10 DIAGNOSIS — F41.1 GENERALIZED ANXIETY DISORDER: ICD-10-CM

## 2025-02-10 DIAGNOSIS — M25.562 ACUTE PAIN OF LEFT KNEE: ICD-10-CM

## 2025-02-10 DIAGNOSIS — M25.551 RIGHT HIP PAIN: ICD-10-CM

## 2025-02-10 DIAGNOSIS — Z86.0100 HISTORY OF COLON POLYPS: ICD-10-CM

## 2025-02-10 DIAGNOSIS — E66.01 CLASS 2 SEVERE OBESITY WITH SERIOUS COMORBIDITY AND BODY MASS INDEX (BMI) OF 35.0 TO 35.9 IN ADULT, UNSPECIFIED OBESITY TYPE (HCC): ICD-10-CM

## 2025-02-10 DIAGNOSIS — E66.812 CLASS 2 SEVERE OBESITY WITH SERIOUS COMORBIDITY AND BODY MASS INDEX (BMI) OF 35.0 TO 35.9 IN ADULT, UNSPECIFIED OBESITY TYPE (HCC): ICD-10-CM

## 2025-02-10 DIAGNOSIS — J45.909 ASTHMA DUE TO SEASONAL ALLERGIES: ICD-10-CM

## 2025-02-10 DIAGNOSIS — Z13.1 DIABETES MELLITUS SCREENING: ICD-10-CM

## 2025-02-10 DIAGNOSIS — Z23 ENCOUNTER FOR IMMUNIZATION: ICD-10-CM

## 2025-02-10 DIAGNOSIS — E29.1 HYPOGONADISM IN MALE: ICD-10-CM

## 2025-02-10 DIAGNOSIS — E78.2 MIXED HYPERLIPIDEMIA: ICD-10-CM

## 2025-02-10 DIAGNOSIS — F31.81 BIPOLAR 2 DISORDER (HCC): ICD-10-CM

## 2025-02-10 PROCEDURE — 99214 OFFICE O/P EST MOD 30 MIN: CPT | Performed by: FAMILY MEDICINE

## 2025-02-10 PROCEDURE — 90471 IMMUNIZATION ADMIN: CPT

## 2025-02-10 PROCEDURE — 73502 X-RAY EXAM HIP UNI 2-3 VIEWS: CPT

## 2025-02-10 PROCEDURE — 73562 X-RAY EXAM OF KNEE 3: CPT

## 2025-02-10 PROCEDURE — 90673 RIV3 VACCINE NO PRESERV IM: CPT

## 2025-02-10 RX ORDER — GINSENG 100 MG
50 CAPSULE ORAL DAILY
COMMUNITY

## 2025-02-10 RX ORDER — CIDER VINEGAR 300 MG
3000 TABLET ORAL
COMMUNITY

## 2025-02-10 NOTE — ASSESSMENT & PLAN NOTE
Management per Psych. Continue counseling. Smart phone nathaniel list and counseling list provided previously.     Orders:  •  Magnesium; Future

## 2025-02-10 NOTE — ASSESSMENT & PLAN NOTE
Stable.  Patient declines Metformin XR 500mg 3 pills QD.  Insurance would not cover pre-DM education.  Recommend lifestyle modifications.  To consider short-acting Metformin in future vs. GLP-1 agonist in future PRN?      Orders:  •  Comprehensive metabolic panel; Future  •  Hemoglobin A1C; Future

## 2025-02-10 NOTE — ASSESSMENT & PLAN NOTE
Stable. Continue Fenofibrate 160 mg daily. Recommend lifestyle modifications.     Orders:  •  CBC and differential; Future  •  Comprehensive metabolic panel; Future  •  Lipid panel; Future  •  TSH, 3rd generation with Free T4 reflex; Future  •  LDL cholesterol, direct; Future

## 2025-02-10 NOTE — ASSESSMENT & PLAN NOTE
Stable.  Continue Lipitor 20 mg nightly.  Recommend lifestyle modifications.     Orders:  •  CBC and differential; Future  •  Comprehensive metabolic panel; Future  •  Lipid panel; Future  •  TSH, 3rd generation with Free T4 reflex; Future  •  LDL cholesterol, direct; Future

## 2025-02-10 NOTE — PATIENT INSTRUCTIONS
You may use Tylenol (Acetaminophen) up to 3,000mg daily (in 24 hours) as needed for pain or fever.    You may use Motrin (Ibuprofen) up to 800mg 3 times daily with food (in 24 hours) as needed for pain or fever.

## 2025-02-10 NOTE — PROGRESS NOTES
Assessment/Plan:  Assessment & Plan  IFG (impaired fasting glucose)  Stable.  Patient declines Metformin XR 500mg 3 pills QD.  Insurance would not cover pre-DM education.  Recommend lifestyle modifications.  To consider short-acting Metformin in future vs. GLP-1 agonist in future PRN?      Orders:  •  Comprehensive metabolic panel; Future  •  Hemoglobin A1C; Future    Acute pain of left knee  Pending Left Knee Xray to R/O OA.  Advise Motrin / Tylenol PRN.  Home Exercise Program given.  To PT, Ortho PRN.      Orders:  •  Ambulatory Referral to Physical Therapy; Future  •  Ambulatory Referral to Orthopedic Surgery; Future  •  XR knee 3 vw left non injury; Future    Right hip pain  Pending Right Hip Xray to R/O OA.  Advise Motrin / Tylenol PRN.  Home Exercise Program given.  To PT, Ortho PRN.    Orders:  •  Ambulatory Referral to Physical Therapy; Future  •  Ambulatory Referral to Orthopedic Surgery; Future    Mixed hyperlipidemia  Stable.  Continue Lipitor 20 mg nightly.  Recommend lifestyle modifications.     Orders:  •  CBC and differential; Future  •  Comprehensive metabolic panel; Future  •  Lipid panel; Future  •  TSH, 3rd generation with Free T4 reflex; Future  •  LDL cholesterol, direct; Future    Benign essential hypertension  Elevated BP today.  Check blood pressure outside of office.  Recommend lifestyle modifications.    Orders:  •  CBC and differential; Future  •  Comprehensive metabolic panel; Future  •  Magnesium; Future    Class 2 severe obesity with serious comorbidity and body mass index (BMI) of 35.0 to 35.9 in adult, unspecified obesity type (HCC)  Worsening.  Recommend lifestyle modifications.         Generalized anxiety disorder  Management per Psych. Continue counseling. Smart phone nathaniel list and counseling list provided previously.     Orders:  •  Magnesium; Future    Bipolar 2 disorder (HCC)  Management per Psych. Continue counseling. Smart phone nathaniel list and counseling list provided previously.           RUKHSANA on CPAP  Continue CPAP. Management per Sleep Medicine.        Hypertriglyceridemia  Stable. Continue Fenofibrate 160 mg daily. Recommend lifestyle modifications.     Orders:  •  CBC and differential; Future  •  Comprehensive metabolic panel; Future  •  Lipid panel; Future  •  TSH, 3rd generation with Free T4 reflex; Future  •  LDL cholesterol, direct; Future    Asthma due to seasonal allergies  Management per asthma/allergy.  Stable on Albuterol HFA PRN.         Hypogonadism in male  Management per Uro - overdue for appt. Patient is not taking Androgel and is advised to discuss c Uro.        Low serum alkaline phosphatase  Chronic.  Pending Endo consult.      Orders:  •  Vitamin B6; Future  •  Ambulatory Referral to Endocrinology; Future    Encounter for immunization    Orders:  •  influenza vaccine, recombinant, PF, 0.5 mL IM (Flublok)    History of colon polyps  Colonoscopy is up to date.         Diabetes mellitus screening    Orders:  •  Hemoglobin A1C; Future    Screening for cardiovascular condition    Orders:  •  CBC and differential; Future  •  Comprehensive metabolic panel; Future  •  Lipid panel; Future  •  LDL cholesterol, direct; Future          Return in about 6 months (around 8/10/2025) for Physical / 6mo - IFG, HTN, HL, Anx, BPD, Low T, RUKHSANA, Obesity, Labs.      Future Appointments   Date Time Provider Department Center   2/17/2025 11:00 AM Aries Jenkins [facility]   3/3/2025 11:30 AM Alejandro Carrasco PSY[facility]   8/25/2025  2:15 PM Mayte Peacock DO [facility]   11/20/2025 11:00 AM Earl Maza MD [place] [facility]        Subjective:     Aldair is a 65 y.o. male who presents today for a follow-up on his chronic medical conditions.        HPI:  Chief Complaint   Patient presents with   • Follow-up     Return in about 6 months (around 2/7/2025) for 6mo - IFG, HTN, HL, Anx, BPD, Low T, RUKHSANA, Obesity, Labs.   • Hip Pain     7/10 right hip  pain. Denies any falls or known injury. Worsens with movement and weight bearing.   • Knee Pain     7/10 left knee pain. Denies any falls or known injury. Worsens with movement and weight bearing.     -- Above per clinical staff and reviewed. --      HPI      Today:      Return in about 6 months (around 2/7/2025) for 6mo - IFG, HTN, HL, Anx, BPD, Low T, RUKHSANA, Obesity, Labs.     4mo OV       Retired late 9/21.     Right Lateral Hip Pain - Symptoms x 1.5 years.  Improved slightly after chiropractic care, but returned.  No radiating.  Sharp pain.  Currently 6/10, Max 8/10.  Worse c c crossing ankles, knees, walking.  Denies trauma.  No OTC meds.  Denies locking, popping, giving way.        Left Anterior Medial Knee Pain - Symptoms x 1.5 years.  Worsening.  No radiating.  Sharp pain.  Currently 6/10, Max 8/10.  Worse c rotating knee, walking.  Denies trauma.  No OTC meds.  Denies locking, popping, giving way.          Obesity - Watching diet.  No exercise - Walking with wife 30 minutes, 7 times per week.  Working with a  twice weekly since 2/24.       IFG - He never started Metformin XR 500mg 3 pills QD because the pharmacist told him the dose was too high.  S/p Pre-DM education - last attended 3/9/22, insurance would no longer cover.          HTN - On Lisinopril 30mg QD.  No higher dose or other HTN Rx previously.  No BP check outside of office.  He has an arm BP cuff at home.  Stable Echo 10/10/19.       Hyperlipidemia - On Lipitor 20mg QHS.  No higher doses.  Unsure of other statin name that he had tried previously - ineffective.  s/p clean cardiac cath 10/4/19.       Hypertriglyceridemia - On Fenofibrate 160mg QD.       RUKHSANA on CPAP - Uses CPAP regularly.  Management per Sleep Specialist on [address] - Dr. Earl Maza - Next appt 11/25.  Last sleep study 2017?, no weight changes in the interim.       Bipolar Disorder / Depression - Management per Psych Alejandro Carrasco - Next appt 3/25.  Sees counselor  Aries Jenkins, LCSW q2 weeks - Next appt .  Still seeing onling counselor in interim - next appt .  Feels agitated, rapid cycling, and manic since  (3 months ago).  He denies trigger.  On Seroquel 25mg QHS.  He felt too tired on 50mg pill after taking for 2-3 weeks.  D/C Rexulti 2mg QHS due to fatigue.  Feels 90% improved.  Has more energy, no longer napping, not irritable, improved motivation.  He previously requested to decrease his Cymbalta dose from 90mg QD to 60mg QD due to decreased stress in halfway, but noticed worsening mood and Cymbalta 90mg QD was resumed 22.  D/C Lamictal due to hives.  Good social supports.  Last counseling  - helpful.  No SI/HI/AH/VH.  Last individual online counseling  - not a good fit.       Anxiety - On Seroquel 25mg QHS - as felt too tired on 50mg after taking for 2-3 weeks.  D/C Rexulti 2mg QHS due to fatigue.  On increased Cymbalta 90mg QD.  He no longer feels drained by his anxiety due to increased work stressors, and is able to cope better.  Feels 90% improved.  Previously on Zoloft - ineffective.      PHQ-2/9 Depression Screening    Little interest or pleasure in doing things: 0 - not at all  Feeling down, depressed, or hopeless: 0 - not at all  Trouble falling or staying asleep, or sleeping too much: 0 - not at all  Feeling tired or having little energy: 0 - not at all  Poor appetite or overeatin - not at all  Feeling bad about yourself - or that you are a failure or have let yourself or your family down: 0 - not at all  Trouble concentrating on things, such as reading the newspaper or watching television: 0 - not at all  Moving or speaking so slowly that other people could have noticed. Or the opposite - being so fidgety or restless that you have been moving around a lot more than usual: 0 - not at all  Thoughts that you would be better off dead, or of hurting yourself in some way: 0 - not at all  PHQ-9 Score: 0  PHQ-9 Interpretation: No  or Minimal depression                Low Testosterone - x 2 6/29/20 and 11/25/20.  Management per [facility] Ms. Lillie Goins PA-C.  Next appt 8/23 - Overdue, risks of stopping Androgel discussed.  On Androgel since 5/30/22 - using QOD AMA due to running low on Rx - pt will call [facility] for refill, stopped Androgel 7/23 due to feeling better.  Low libido resolved. + ED.          Asthma - Management per Allergist Dr. Chapin - next appt 10/25.  Triggered by allergies.  On Advair 250/50 1 puff BID until late 10/24.  Uses Albuterol HFA PRN - last used 7/24.  No ICS previously.           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  Never followed c Rheum.       H/O Colon Polyps - Last Colonoscopy 12/8/23 per [facility] Dr. Hinton - Next due in 5 years 12/8/28.       Reviewed:  Labs 2/7/25, Uro 2/20/23, Allergist 10/8/24, Sleep Med 11/14/24     Sees [facility].    The following portions of the patient's history were reviewed and updated as appropriate: allergies, current medications, past family history, past medical history, past social history, past surgical history and problem list.      Review of Systems   Constitutional:  Negative for appetite change, chills, diaphoresis, fatigue and fever.   Respiratory:  Negative for chest tightness and shortness of breath.    Cardiovascular:  Negative for chest pain.   Gastrointestinal:  Negative for abdominal pain, blood in stool, diarrhea, nausea and vomiting.   Genitourinary:  Negative for dysuria.   Musculoskeletal:  Positive for arthralgias.        Current Outpatient Medications   Medication Sig Dispense Refill   • albuterol (ProAir HFA) 90 mcg/act inhaler Inhale 2 puffs every 6 (six) hours as needed for wheezing 8.5 g 0   • atorvastatin (LIPITOR) 20 mg tablet Take 1 tablet (20 mg total) by mouth daily at bedtime 90 tablet 1   • cholecalciferol (VITAMIN D3) 1,000 units tablet Take 1,000 Units by mouth daily     • Cyanocobalamin (B-12) 1000 MCG TABS Take 1 tablet by mouth in the morning     •  "fenofibrate 160 MG tablet Take 1 tablet (160 mg total) by mouth daily 90 tablet 1   • lisinopril (ZESTRIL) 30 mg tablet Take 1 tablet (30 mg total) by mouth daily 90 tablet 1   • Magnesium 400 MG TABS Take 1 tablet by mouth in the morning     • Omega-3 Fatty Acids (Fish Oil) 1000 MG CPDR Take 3,000 mg by mouth     • sertraline (ZOLOFT) 50 mg tablet Take 1 tablet (50 mg total) by mouth daily (Patient taking differently: Take 50 mg by mouth daily Rx per Psych) 90 tablet 1   • Zinc 50 MG TABS Take 50 mg by mouth daily     • Fluticasone-Salmeterol (Advair Diskus) 250-50 mcg/dose inhaler Inhale 1 puff every 12 (twelve) hours Rinse mouth after use. (Patient not taking: Reported on 2/10/2025) 60 blister 5     No current facility-administered medications for this visit.       Objective:  /82 (BP Location: Left arm, Patient Position: Sitting, Cuff Size: Large)   Pulse 105   Temp 98.6 °F (37 °C) (Temporal)   Resp 18   Ht 5' 7.25\" (1.708 m)   Wt 104 kg (229 lb)   SpO2 96%   BMI 35.60 kg/m²    Wt Readings from Last 3 Encounters:   02/10/25 104 kg (229 lb)   11/14/24 102 kg (224 lb)   10/08/24 95.7 kg (211 lb)      BP Readings from Last 3 Encounters:   02/10/25 134/82   11/14/24 114/78   10/08/24 152/92          Physical Exam  Vitals and nursing note reviewed.   Constitutional:       General: He is not in acute distress.     Appearance: Normal appearance. He is well-developed. He is obese. He is not ill-appearing or toxic-appearing.   HENT:      Head: Normocephalic and atraumatic.   Eyes:      Conjunctiva/sclera: Conjunctivae normal.   Neck:      Thyroid: No thyromegaly.      Vascular: No carotid bruit.   Cardiovascular:      Rate and Rhythm: Normal rate and regular rhythm.      Pulses: Normal pulses.      Heart sounds: Normal heart sounds.   Pulmonary:      Effort: Pulmonary effort is normal.      Breath sounds: Normal breath sounds.   Abdominal:      General: Bowel sounds are normal. There is no distension.      " "Palpations: Abdomen is soft. There is no mass.      Tenderness: There is no abdominal tenderness. There is no guarding or rebound.   Musculoskeletal:         General: No swelling or tenderness.      Cervical back: Neck supple.      Right hip: Normal.      Left hip: Normal.      Right knee: Normal.      Instability Tests: Anterior drawer test negative. Posterior drawer test negative. Anterior Lachman test negative. Medial Opal test negative and lateral Opal test negative.      Left knee: Normal.      Instability Tests: Anterior drawer test negative. Posterior drawer test negative. Anterior Lachman test negative. Medial Opal test negative and lateral Opal test negative.      Right lower leg: No edema.      Left lower leg: No edema.   Lymphadenopathy:      Cervical: No cervical adenopathy.   Neurological:      General: No focal deficit present.      Mental Status: He is alert and oriented to person, place, and time.   Psychiatric:         Mood and Affect: Mood normal.         Behavior: Behavior normal.         Thought Content: Thought content normal.         Judgment: Judgment normal.         Lab Results:      Lab Results   Component Value Date    WBC 5.77 04/15/2024    HGB 15.6 04/15/2024    HCT 48.2 04/15/2024     04/15/2024    TRIG 215 (H) 02/07/2025    HDL 41 02/07/2025    LDLDIRECT 116 (H) 02/07/2025    ALT 17 02/07/2025    AST 16 02/07/2025    K 3.9 02/07/2025     02/07/2025    CREATININE 1.27 02/07/2025    BUN 21 02/07/2025    CO2 31 02/07/2025    PSA 1.3 08/17/2022    INR 1.0 10/01/2019    GLUF 93 02/07/2025    HGBA1C 5.9 (H) 02/07/2025     No results found for: \"URICACID\"  Invalid input(s): \"BASENAME\" Vitamin D    No results found.     POCT Labs                       "

## 2025-02-10 NOTE — ASSESSMENT & PLAN NOTE
Elevated BP today.  Check blood pressure outside of office.  Recommend lifestyle modifications.    Orders:  •  CBC and differential; Future  •  Comprehensive metabolic panel; Future  •  Magnesium; Future

## 2025-02-12 ENCOUNTER — TELEPHONE (OUTPATIENT)
Age: 65
End: 2025-02-12

## 2025-02-12 NOTE — TELEPHONE ENCOUNTER
Patient called and is requesting a psych eval and does he need a special appt for that or is there anything he needs to know regarding a psych eval

## 2025-02-13 NOTE — TELEPHONE ENCOUNTER
Spoke with Darian. He said Alejandro had mentioned a questionnaire that could be sent to his house to help determine a diagnosis for him. Forwarding to provider for review. Clinical will follow up as advised.

## 2025-02-13 NOTE — TELEPHONE ENCOUNTER
Called Darian and reviewed that he was referred for psychological testing and is likely on the wait list. Also reviewed that he could be referring to a self eval questionnaire for ADHD. He requested that be sent to his house and said he is still interested in the psychological testing when a spot opens.

## 2025-02-14 ENCOUNTER — OFFICE VISIT (OUTPATIENT)
Dept: OBGYN CLINIC | Facility: CLINIC | Age: 65
End: 2025-02-14
Payer: COMMERCIAL

## 2025-02-14 VITALS — BODY MASS INDEX: 35.94 KG/M2 | HEIGHT: 67 IN | WEIGHT: 229 LBS

## 2025-02-14 DIAGNOSIS — G57.01 PIRIFORMIS SYNDROME, RIGHT: ICD-10-CM

## 2025-02-14 DIAGNOSIS — M47.816 OSTEOARTHRITIS OF LUMBAR SPINE, UNSPECIFIED SPINAL OSTEOARTHRITIS COMPLICATION STATUS: ICD-10-CM

## 2025-02-14 DIAGNOSIS — M70.61 GREATER TROCHANTERIC BURSITIS OF RIGHT HIP: Primary | ICD-10-CM

## 2025-02-14 DIAGNOSIS — M25.551 RIGHT HIP PAIN: ICD-10-CM

## 2025-02-14 DIAGNOSIS — E78.1 HYPERTRIGLYCERIDEMIA: ICD-10-CM

## 2025-02-14 PROCEDURE — 99203 OFFICE O/P NEW LOW 30 MIN: CPT | Performed by: ORTHOPAEDIC SURGERY

## 2025-02-14 PROCEDURE — 20610 DRAIN/INJ JOINT/BURSA W/O US: CPT | Performed by: ORTHOPAEDIC SURGERY

## 2025-02-14 RX ORDER — FENOFIBRATE 160 MG/1
160 TABLET ORAL DAILY
Qty: 90 TABLET | Refills: 1 | Status: SHIPPED | OUTPATIENT
Start: 2025-02-14

## 2025-02-14 RX ORDER — TRIAMCINOLONE ACETONIDE 40 MG/ML
80 INJECTION, SUSPENSION INTRA-ARTICULAR; INTRAMUSCULAR
Status: COMPLETED | OUTPATIENT
Start: 2025-02-14 | End: 2025-02-14

## 2025-02-14 RX ORDER — BUPIVACAINE HYDROCHLORIDE 2.5 MG/ML
2 INJECTION, SOLUTION INFILTRATION; PERINEURAL
Status: COMPLETED | OUTPATIENT
Start: 2025-02-14 | End: 2025-02-14

## 2025-02-14 RX ADMIN — TRIAMCINOLONE ACETONIDE 80 MG: 40 INJECTION, SUSPENSION INTRA-ARTICULAR; INTRAMUSCULAR at 15:00

## 2025-02-14 RX ADMIN — BUPIVACAINE HYDROCHLORIDE 2 ML: 2.5 INJECTION, SOLUTION INFILTRATION; PERINEURAL at 15:00

## 2025-02-14 NOTE — ASSESSMENT & PLAN NOTE
Orders:    Ambulatory Referral to Orthopedic Surgery    Large joint arthrocentesis: L greater trochanteric bursa

## 2025-02-14 NOTE — PROGRESS NOTES
Name: Aldair Juárez Jr.      : 1960      MRN: [id number]  Encounter Provider: Marshall Yee DO  Encounter Date: 2025   Encounter department: [facility]  :  Assessment & Plan  Greater trochanteric bursitis of right hip    Orders:    Large joint arthrocentesis: L greater trochanteric bursa    Piriformis syndrome, right         Right hip pain    Orders:    Ambulatory Referral to Orthopedic Surgery    Large joint arthrocentesis: L greater trochanteric bursa    Osteoarthritis of lumbar spine, unspecified spinal osteoarthritis complication status    Orders:    Large joint arthrocentesis: L greater trochanteric bursa            Plan:  Aldair Juárez Jr. is a 65 y.o. male with right greater trochanteric bursitis, piriformis syndrome, lumbar arthropathy  Diagnostics reviewed and physical exam performed.  Diagnosis, treatment options and associated risks were discussed with the patient including no treatment, nonsurgical treatment and potential for surgical intervention.  The patient was given the opportunity to ask questions regarding each.   Physical exam, diagnostic imaging, and subjective history are all most consistent with the above diagnosis. The pathoanatomy and natural history of this diagnosis was explained to the patient in detail.   X-ray obtained today and reviewed with the patient demonstrating mild osteoarthritis, lateral pelvic tilt with no apparent leg length discrepancy  Patient was offered, accepted, and received a cortisone injection of the right greater trochanteric bursa today. Patient tolerated procedure well with no immediate complications. Post-injection protocols and expectations were discussed with the patient.   Order placed today for patient to initiate outpatient Physical Therapy  Apply Voltaren gel to painful area up to 4 times a day  May use ice or heat as needed for pain relief  May take NSAIDs/Tylenol as needed for pain control   Follow  up next week as scheduled for right knee evaluation    Subjective:    Patient ID:  Aldair Juárez Jr. 65 y.o. male    History of Present Illness   HPI    Aldair Juárez Jr. is a 65 y.o. male who presents today for evaluation and treatment of right hip pain ongoing for two years with no obvious mechanism of injury, trauma, or inciting event. He notes moderate pain aggravated by crossing his legs, prolonged sitting, sleeping on his right side. Pain disrupts his sleep. He denies groin pain. Denies distal paraesthesias or pain radiating down the thigh.           Lab Results   Component Value Date    HGBA1C 5.9 (H) 02/07/2025       The following portions of the patient's history were reviewed and updated as appropriate: allergies, current medications, past family history, past social history, past surgical history and problem list.      Social History     Socioeconomic History    Marital status: /Civil Union     Spouse name: Not on file    Number of children: 1    Years of education: Not on file    Highest education level: Not on file   Occupational History    Occupation: [employer]   Tobacco Use    Smoking status: Never    Smokeless tobacco: Never   Vaping Use    Vaping status: Never Used   Substance and Sexual Activity    Alcohol use: Yes     Alcohol/week: 1.0 standard drink of alcohol     Types: 1 Cans of beer per week     Comment: rarely drink    Drug use: No    Sexual activity: Yes     Partners: Female     Birth control/protection: None   Other Topics Concern    Not on file   Social History Narrative        Lives with wife    1 child - 1 son    [employer]     Social Drivers of Health     Financial Resource Strain: Not on file   Food Insecurity: Not on file   Transportation Needs: Not on file   Physical Activity: Not on file   Stress: Not on file   Social Connections: Not on file   Intimate Partner Violence: Not on file   Housing Stability: Not on file     Past  Medical History:   Diagnosis Date    Anxiety     Asthma     Asthma due to seasonal allergies     Benign essential hypertension 08/16/2019    Bipolar 2 disorder (HCC) 02/20/2015    CPAP (continuous positive airway pressure) dependence     Depression     Diverticulosis     History of colon polyps     History of colon polyps 12/08/2023    Hypertension     Hypertriglyceridemia 06/01/2022    Hypogonadism in male 06/01/2022    IFG (impaired fasting glucose)     Mixed hyperlipidemia 08/16/2019    Morbid obesity (HCC) 04/13/2021    Obesity     RUKHSANA on CPAP 10/14/2020    Soledad's syndrome     x 2, Urethritis, Conjunctivtis, Joint Pains     Past Surgical History:   Procedure Laterality Date    COLONOSCOPY      WISDOM TOOTH EXTRACTION       Allergies   Allergen Reactions    Lamotrigine Hives     Current Outpatient Medications on File Prior to Visit   Medication Sig Dispense Refill    albuterol (ProAir HFA) 90 mcg/act inhaler Inhale 2 puffs every 6 (six) hours as needed for wheezing 8.5 g 0    atorvastatin (LIPITOR) 20 mg tablet Take 1 tablet (20 mg total) by mouth daily at bedtime 90 tablet 1    cholecalciferol (VITAMIN D3) 1,000 units tablet Take 1,000 Units by mouth daily      Cyanocobalamin (B-12) 1000 MCG TABS Take 1 tablet by mouth in the morning      Fluticasone-Salmeterol (Advair Diskus) 250-50 mcg/dose inhaler Inhale 1 puff every 12 (twelve) hours Rinse mouth after use. (Patient not taking: Reported on 2/10/2025) 60 blister 5    lisinopril (ZESTRIL) 30 mg tablet Take 1 tablet (30 mg total) by mouth daily 90 tablet 1    Magnesium 400 MG TABS Take 1 tablet by mouth in the morning      Omega-3 Fatty Acids (Fish Oil) 1000 MG CPDR Take 3,000 mg by mouth      sertraline (ZOLOFT) 50 mg tablet Take 1 tablet (50 mg total) by mouth daily (Patient taking differently: Take 50 mg by mouth daily Rx per Psych) 90 tablet 1    Zinc 50 MG TABS Take 50 mg by mouth daily      [DISCONTINUED] fenofibrate 160 MG tablet Take 1 tablet (160 mg  "total) by mouth daily 90 tablet 1     No current facility-administered medications on file prior to visit.            Objective:  Objective   Ht 5' 7.25\" (1.708 m)   Wt 104 kg (229 lb)   BMI 35.60 kg/m²        Review of Systems  Pertinent items are noted in HPI.  All other systems were reviewed and are negative.    Physical Exam  Cons: Appears well.  No apparent distress.  Psych: Alert. Oriented x3.  Mood and affect normal.  Eyes: PERRLA, EOMI  Resp: Normal effort.  No audible wheezing or stridor.  CV: Palpable pulse.  No discernable arrhythmia.  No LE edema.  Lymph:  No palpable cervical, axillary, or inguinal lymphadenopathy.  Skin: Warm.  No palpable masses.  No visible lesions.  Neuro: Normal muscle tone.  Normal and symmetric DTR's.    BMI:   Estimated body mass index is 35.6 kg/m² as calculated from the following:    Height as of this encounter: 5' 7.25\" (1.708 m).    Weight as of this encounter: 104 kg (229 lb).    Right Hip Exam     Tenderness   The patient is experiencing tenderness in the greater trochanter and posterior (piriformis).    Range of Motion   The patient has normal right hip ROM.    Muscle Strength   The patient has normal right hip strength.    Other   Erythema: absent  Scars: absent  Sensation: normal  Pulse: present    Comments:    Pain with Jada, resisted abduction  Sensation intact in DP/SP/Elias/Sa/T nerve distributions  2+ DP & PT pulses  Brisk capillary refill in all toes              Procedures  Large joint arthrocentesis: L greater trochanteric bursa  Universal Protocol:  Consent: Verbal consent obtained.  Risks and benefits: risks, benefits and alternatives were discussed  Consent given by: patient  Time out: Immediately prior to procedure a \"time out\" was called to verify the correct patient, procedure, equipment, support staff and site/side marked as required.  Patient understanding: patient states understanding of the procedure being performed  Site marked: the operative site was " "marked  Supporting Documentation  Indications: pain and diagnostic evaluation   Procedure Details  Location: hip - L greater trochanteric bursa  Preparation: Patient was prepped and draped in the usual sterile fashion  Needle size: 22 G  Approach: lateral  Medications administered: 2 mL bupivacaine 0.25 %; 80 mg triamcinolone acetonide 40 mg/mL    Patient tolerance: patient tolerated the procedure well with no immediate complications  Dressing:  Sterile dressing applied             Diagnostics, reviewed and taken today if performed as documented:  I have personally reviewed pertinent films in PACS and my interpretation is no acute osseous abnormalities. Mild acetabular degenerative changes. Sacralization of the lumbar spine with mild scoliotic changes resulting in pelvic left tilt .        Scribe Attestation      I,:  Oksana Scanlon am acting as a scribe while in the presence of the attending physician.:       I,:  Marshall Yee DO personally performed the services described in this documentation    as scribed in my presence.:             Portions of the record may have been created with voice recognition software.  Occasional wrong word or \"sound a like\" substitutions may have occurred due to the inherent limitations of voice recognition software.  Read the chart carefully and recognize, using context, where substitutions have occurred.  "

## 2025-02-17 ENCOUNTER — RESULTS FOLLOW-UP (OUTPATIENT)
Dept: FAMILY MEDICINE CLINIC | Facility: CLINIC | Age: 65
End: 2025-02-17

## 2025-02-17 ENCOUNTER — SOCIAL WORK (OUTPATIENT)
Dept: BEHAVIORAL/MENTAL HEALTH CLINIC | Facility: CLINIC | Age: 65
End: 2025-02-17
Payer: COMMERCIAL

## 2025-02-17 DIAGNOSIS — F43.10 POST TRAUMATIC STRESS DISORDER (PTSD): Primary | ICD-10-CM

## 2025-02-17 DIAGNOSIS — F32.9 REACTIVE DEPRESSION: ICD-10-CM

## 2025-02-17 PROBLEM — M17.12 OSTEOARTHRITIS OF LEFT KNEE: Status: ACTIVE | Noted: 2025-02-17

## 2025-02-17 PROBLEM — M16.0 OSTEOARTHRITIS OF HIPS, BILATERAL: Status: ACTIVE | Noted: 2025-02-17

## 2025-02-17 PROCEDURE — 90834 PSYTX W PT 45 MINUTES: CPT | Performed by: SOCIAL WORKER

## 2025-02-17 NOTE — RESULT ENCOUNTER NOTE
Left knee x-ray shows multiple areas of osteoarthritis, moderate on the inside part of the knee.  There is a moderate amount of fluid on the knee as well.    Right hip and pelvis x-ray shows mild bilateral hip osteoarthritis without any new abnormality.    Continue plan of care-physical therapy and orthopedic consult.    Message sent to patient via [facility] patient portal.

## 2025-02-17 NOTE — PSYCH
1. Post traumatic stress disorder (PTSD)        2. Reactive depression              Data: Pt reports occasional flare up through triggers. Pt is noticing these flare ups and what they relate to from his past. Pt is quicker to communicate after a flare up, and is noticing he is not as likely to stay in a hypo-aroused state. Pt's primary mechanism for this is to communicate and debrief with his wife. Pt is not always doing this, but the majority of the time he is quicker to get back and communicate and discuss what he could have done better and what he will work on next time.     Pt reports he and his wife are generally doing much better. Pt and wife are communicating and pt feels better around his wife, and they are enjoying spending time together. Pt reports he feels a little expansive today, but not severely. Pt is still wondering whether he has ADHD and this writer feels pt is much more on the complex PTSD spectrum, and his struggles with impulsivity are more connected to PTSD then ADHD. Nevertheless the solution is similar, and pt is encouraged to find a way to be self reflective and to focus on holding himself and other people holding him accountable for his actions, so that he has more accountability to prevent the social and financial cost of impulsivity.        Plan: Follow up in 4 weeks.     Psychotherapy Provided: Individual Psychotherapy 45 minutes     Length of time in session: 45 minutes, follow up in 4 weeks    Goals addressed in session: Goal 1 and Goal 2     Pain:      none    0    Current suicide risk : Low     Pt is future oriented and not suicidal.       Behavioral Health Treatment Plan  [facility]: Diagnosis and Treatment Plan explained to Clarissa Romo relates understanding diagnosis and is agreeable to Treatment Plan. Yes     Visit start and stop times:       02/18/25  Start Time: 1100  Stop Time: 1145  Total Visit Time: 45 minutes

## 2025-02-17 NOTE — RESULT ENCOUNTER NOTE
Left knee x-ray shows multiple areas of osteoarthritis, moderate on the inside part of the knee.  There is a moderate amount of fluid on the knee as well.    Right hip and pelvis x-ray shows mild bilateral hip osteoarthritis without any new abnormality.    Continue plan of care-physical therapy and orthopedic consult.    Message sent to patient via [facility] patient portal.

## 2025-02-18 NOTE — BH TREATMENT PLAN
Outpatient Behavioral Health Psychotherapy Treatment Plan     Darian Juárez Jr.  1960      Date of Initial Psychotherapy Assessment: 7/1/2024  Date of Current Treatment Plan: 2/17/25  Treatment Plan Target Date: 8/17/25  Treatment Plan Expiration Date: 8/17/25     Diagnosis:   1. Reactive depression          2. Post traumatic stress disorder (PTSD)                Area(s) of Need: Anger, depressive episodes sometimes last several days, difficulty with communication, struggle with emotional regulation.      Long Term Goal 1 (in the client's own words): work to resolve depressive concerns.      Stage of Change: Action     Target Date for completion: 8/17/25             Anticipated therapeutic modalities: CBT, Solution focused, Strength based.             People identified to complete this goal: Pt and this writer, Aries Jenkins                    Objective 1: (identify the means of measuring success in meeting the objective): Identify triggers of depression.                     Objective 2: (identify the means of measuring success in meeting the objective): Demonstrate behaviors and thoughts which counter depression- show reduction in depression accordingly.        Long Term Goal 2 (in the client's own words): Help identify how pt can stay in window of tolerance, with understanding of triggers and what to do to maintain emotional regulation.      Stage of Change: Action     Target Date for completion: 8/17/25     Anticipated therapeutic modalities: CBT and DBT             People identified to complete this goal: Pt and this writer, Aries Jenkins LCSW                    Objective 1: (identify the means of measuring success in meeting the objective): Pt has understanding of triggers and can verbalize them.                     Objective 2: (identify the means of measuring success in meeting the objective): Pt can identify when he is moving into hypoarousal or hyperarousal and get himself back into window  of tolerance using grounding techniques and more helpful cognitive processes.       Long Term Goal 3 (in the client's own words): Work to resolve anger problems.      Stage of Change: Action     Target Date for completion: 8/17/25     Objective 1: (identify the means of measuring success in meeting the objective): Identify sources of anger.                      Objective 2: (identify the means of measuring success in meeting the objective): Verbalize and demonstrate evidence in how to de-escalate anger. Identify early warning indicators of anger and verbalize and show evidence of how to contain anger and not allow it to carry over into other areas of life, unrelated to initial trigger.        I am currently under the care of a [facility] psychiatric provider: no     My [facility] psychiatric provider is: NA     I am currently taking psychiatric medications: Yes, as prescribed     I feel that I will be ready for discharge from mental health care when I reach the following (measurable goal/objective): Emotions are better regulated, anger episodes are diminished, pt is spending less time in hypoarousal and depressed state following a trigger as self reported.      For children and adults who have a legal guardian:          Has there been any change to custody orders and/or guardianship status? NA. If yes, attach updated documentation.     I have created my Crisis Plan and have been offered a copy of this plan     Behavioral Health Treatment Plan [facility]: Diagnosis and Treatment Plan explained to Darian Juárez Jr. Darian Juárez Jr. acknowledges an understanding of their diagnosis. Darian Juárez Jr. agrees to this treatment plan.     I have been offered a copy of this Treatment Plan. yes

## 2025-02-20 ENCOUNTER — OFFICE VISIT (OUTPATIENT)
Dept: OBGYN CLINIC | Facility: CLINIC | Age: 65
End: 2025-02-20
Payer: COMMERCIAL

## 2025-02-20 VITALS — BODY MASS INDEX: 35.94 KG/M2 | WEIGHT: 229 LBS | HEIGHT: 67 IN

## 2025-02-20 DIAGNOSIS — M17.12 ARTHRITIS OF LEFT KNEE: Primary | ICD-10-CM

## 2025-02-20 PROCEDURE — 99214 OFFICE O/P EST MOD 30 MIN: CPT | Performed by: ORTHOPAEDIC SURGERY

## 2025-02-20 PROCEDURE — 20610 DRAIN/INJ JOINT/BURSA W/O US: CPT | Performed by: PHYSICIAN ASSISTANT

## 2025-02-20 RX ORDER — TRIAMCINOLONE ACETONIDE 40 MG/ML
80 INJECTION, SUSPENSION INTRA-ARTICULAR; INTRAMUSCULAR
Status: COMPLETED | OUTPATIENT
Start: 2025-02-20 | End: 2025-02-20

## 2025-02-20 RX ORDER — BUPIVACAINE HYDROCHLORIDE 2.5 MG/ML
2 INJECTION, SOLUTION INFILTRATION; PERINEURAL
Status: COMPLETED | OUTPATIENT
Start: 2025-02-20 | End: 2025-02-20

## 2025-02-20 RX ADMIN — TRIAMCINOLONE ACETONIDE 80 MG: 40 INJECTION, SUSPENSION INTRA-ARTICULAR; INTRAMUSCULAR at 14:15

## 2025-02-20 RX ADMIN — BUPIVACAINE HYDROCHLORIDE 2 ML: 2.5 INJECTION, SOLUTION INFILTRATION; PERINEURAL at 14:15

## 2025-02-20 NOTE — PROGRESS NOTES
Name: Aldair Juárez Jr.      : 1960      MRN: [id number]  Encounter Provider: Marshall Yee DO  Encounter Date: 2025   Encounter department: [facility]  :  Assessment & Plan  Arthritis of left knee                 Plan:  Aldair Juárez Jr. is a 65 y.o. male with left knee arthritis  Physical exam, diagnostic imaging, and subjective history are all most consistent with the above diagnosis. The pathoanatomy and natural history of this diagnosis was explained to the patient in detail.   Patient was offered, accepted, and received a cortisone injection of the left knee today. Patient tolerated procedure well with no immediate complications. Post-injection protocols and expectations were discussed with the patient.   Order placed today for visco injections  May use ice or heat as needed for pain relief  May take NSAIDs/Tylenol as needed for pain control   May obtain medial compartment unloading brace for pain relief.  Patient scheduled to begin physical therapy.    Subjective:    Patient ID:  Aldair Juárez Jr. 65 y.o. male    History of Present Illness   HPI    Aldair Juárez Jr. is a 65 y.o. male who presents today with left knee pain that has been present for the past 1 1/2 years. Patient denies injury. Patient has increased pain with weight bearing activities. Patient denies swelling, instability.Patient denies previous treatment for his knee.          Lab Results   Component Value Date    HGBA1C 5.9 (H) 2025       The following portions of the patient's history were reviewed and updated as appropriate: allergies, current medications, past family history, past social history, past surgical history and problem list.      Social History     Socioeconomic History    Marital status: /Civil Union     Spouse name: Not on file    Number of children: 1    Years of education: Not on file    Highest education level: Not on file   Occupational History     Occupation: [employer]   Tobacco Use    Smoking status: Never    Smokeless tobacco: Never   Vaping Use    Vaping status: Never Used   Substance and Sexual Activity    Alcohol use: Yes     Alcohol/week: 1.0 standard drink of alcohol     Types: 1 Cans of beer per week     Comment: rarely drink    Drug use: No    Sexual activity: Yes     Partners: Female     Birth control/protection: None   Other Topics Concern    Not on file   Social History Narrative        Lives with wife    1 child - 1 son    [employer]     Social Drivers of Health     Financial Resource Strain: Not on file   Food Insecurity: Not on file   Transportation Needs: Not on file   Physical Activity: Not on file   Stress: Not on file   Social Connections: Not on file   Intimate Partner Violence: Not on file   Housing Stability: Not on file     Past Medical History:   Diagnosis Date    Anxiety     Asthma     Asthma due to seasonal allergies     Benign essential hypertension 08/16/2019    Bipolar 2 disorder (HCC) 02/20/2015    CPAP (continuous positive airway pressure) dependence     Depression     Diverticulosis     History of colon polyps     History of colon polyps 12/08/2023    Hypertension     Hypertriglyceridemia 06/01/2022    Hypogonadism in male 06/01/2022    IFG (impaired fasting glucose)     Mixed hyperlipidemia 08/16/2019    Morbid obesity (HCC) 04/13/2021    Obesity     RUKHSANA on CPAP 10/14/2020    Osteoarthritis of hips, bilateral 02/17/2025    Osteoarthritis of left knee 02/17/2025    Soledad's syndrome     x 2, Urethritis, Conjunctivtis, Joint Pains     Past Surgical History:   Procedure Laterality Date    COLONOSCOPY      WISDOM TOOTH EXTRACTION       Allergies   Allergen Reactions    Lamotrigine Hives     Current Outpatient Medications on File Prior to Visit   Medication Sig Dispense Refill    albuterol (ProAir HFA) 90 mcg/act inhaler Inhale 2 puffs every 6 (six) hours as needed for wheezing 8.5 g 0     "atorvastatin (LIPITOR) 20 mg tablet Take 1 tablet (20 mg total) by mouth daily at bedtime 90 tablet 1    cholecalciferol (VITAMIN D3) 1,000 units tablet Take 1,000 Units by mouth daily      Cyanocobalamin (B-12) 1000 MCG TABS Take 1 tablet by mouth in the morning      fenofibrate 160 MG tablet TAKE 1 TABLET DAILY 90 tablet 1    Fluticasone-Salmeterol (Advair Diskus) 250-50 mcg/dose inhaler Inhale 1 puff every 12 (twelve) hours Rinse mouth after use. (Patient not taking: Reported on 2/10/2025) 60 blister 5    lisinopril (ZESTRIL) 30 mg tablet Take 1 tablet (30 mg total) by mouth daily 90 tablet 1    Magnesium 400 MG TABS Take 1 tablet by mouth in the morning      Omega-3 Fatty Acids (Fish Oil) 1000 MG CPDR Take 3,000 mg by mouth      sertraline (ZOLOFT) 50 mg tablet Take 1 tablet (50 mg total) by mouth daily (Patient taking differently: Take 50 mg by mouth daily Rx per Psych) 90 tablet 1    Zinc 50 MG TABS Take 50 mg by mouth daily       No current facility-administered medications on file prior to visit.            Objective:  Objective   Ht 5' 7.25\" (1.708 m)   Wt 104 kg (229 lb)   BMI 35.60 kg/m²        Review of Systems  Pertinent items are noted in HPI.  All other systems were reviewed and are negative.    Physical Exam  Cons: Appears well.  No apparent distress.  Psych: Alert. Oriented x3.  Mood and affect normal.  Eyes: PERRLA, EOMI  Resp: Normal effort.  No audible wheezing or stridor.  CV: Palpable pulse.  No discernable arrhythmia.  No LE edema.  Lymph:  No palpable cervical, axillary, or inguinal lymphadenopathy.  Skin: Warm.  No palpable masses.  No visible lesions.  Neuro: Normal muscle tone.  Normal and symmetric DTR's.    BMI:   Estimated body mass index is 35.6 kg/m² as calculated from the following:    Height as of this encounter: 5' 7.25\" (1.708 m).    Weight as of this encounter: 104 kg (229 lb).    Left Knee Exam     Tenderness   The patient is experiencing tenderness in the medial joint " "line.    Range of Motion   The patient has normal left knee ROM.    Other   Sensation: normal  Pulse: present  Swelling: none  Effusion: no effusion present    Comments:  +crepitus            Procedures  Large joint arthrocentesis: L knee  Universal Protocol:  Consent: Verbal consent obtained.  Risks and benefits: risks, benefits and alternatives were discussed  Consent given by: patient  Patient understanding: patient states understanding of the procedure being performed  Patient identity confirmed: verbally with patient  Supporting Documentation  Indications: pain   Procedure Details  Location: knee - L knee  Needle size: 22 G  Approach: anterolateral  Medications administered: 2 mL bupivacaine 0.25 %; 80 mg triamcinolone acetonide 40 mg/mL    Patient tolerance: patient tolerated the procedure well with no immediate complications  Dressing:  Sterile dressing applied             Diagnostics, reviewed and taken today if performed as documented:  I have personally reviewed pertinent films in PACS and my 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        Portions of the record may have been created with voice recognition software.  Occasional wrong word or \"sound a like\" substitutions may have occurred due to the inherent limitations of voice recognition software.  Read the chart carefully and recognize, using context, where substitutions have occurred.   "

## 2025-03-03 ENCOUNTER — OFFICE VISIT (OUTPATIENT)
Dept: PSYCHIATRY | Facility: CLINIC | Age: 65
End: 2025-03-03
Payer: COMMERCIAL

## 2025-03-03 DIAGNOSIS — F43.10 POST TRAUMATIC STRESS DISORDER (PTSD): ICD-10-CM

## 2025-03-03 DIAGNOSIS — F41.1 GENERALIZED ANXIETY DISORDER: ICD-10-CM

## 2025-03-03 DIAGNOSIS — F31.81 BIPOLAR 2 DISORDER (HCC): Primary | ICD-10-CM

## 2025-03-03 PROCEDURE — 99214 OFFICE O/P EST MOD 30 MIN: CPT | Performed by: PHYSICIAN ASSISTANT

## 2025-03-03 NOTE — PSYCH
MEDICATION MANAGEMENT NOTE    Name: Aldair Juárez Jr.      : 1960      MRN: [id number]  Encounter Provider: Alejandro Carrasco  Encounter Date: 3/3/2025   Encounter department: [facility]    Insurance: Payor: BLUE CROSS / Plan: MISC BLUE CROSS / Product Type: Blue Fee for Service /      Reason for Visit:   Chief Complaint   Patient presents with    Follow-up    Medication Management    Anxiety    Manic Behavior   :  Assessment & Plan  Bipolar 2 disorder (HCC)  Stable - continue sertraline 50 mg qd; still waiting on psychological testing for bipolar vs. ADHD; continue talk therapy; f/u in 3-4 months    Orders:    sertraline (ZOLOFT) 50 mg tablet; Take 1 tablet (50 mg total) by mouth daily    Generalized anxiety disorder  Stable - continue sertraline 50 mg qd; still waiting on psychological testing for bipolar vs. ADHD; continue talk therapy; f/u in 3-4 months         Post traumatic stress disorder (PTSD)  Stable - continue sertraline 50 mg qd; still waiting on psychological testing for bipolar vs. ADHD; continue talk therapy; f/u in 3-4 months    Orders:    sertraline (ZOLOFT) 50 mg tablet; Take 1 tablet (50 mg total) by mouth daily      Still waiting on psychological testing for bipolar 2 vs. ADHD w/ depression. For now continue sertraline 50 mg qd. Option of starting on bupropion  mg qAM if he wants to try to improve focus while waiting for testing. He does not want to do this right now but will let me know if he changes his mind. PARQ completed including induction of zora, decreased seizure threshold and risk with alcohol or electrolyte disturbances, headaches, hypertension and cardiovascular effects, GI distress, weight loss, agitation/activation, dizziness, tremor, anxiety, potential for drug interactions, and others.     Treatment Recommendations:    Continue current medication:    - sertraline 50 mg qd    Educated about diagnosis and treatment modalities.  Verbalizes understanding and agreement with the treatment plan.  Discussed self monitoring of symptoms, and symptom monitoring tools.  Discussed medications and if treatment adjustment was needed or desired.  Medication management every 3 months  Aware of 24 hour and weekend coverage for urgent situations accessed by calling [facility] main practice number  Continue psychotherapy with [facility] therapist Aries Jenkins  I am scheduling this patient out for greater than 3 months: Yes - Patient's stability of symptoms warrant this length of time or no significant changes to treatment plan  If sooner appointment needed, patient will reach out    Medications Risks/Benefits:      Risks, Benefits And Possible Side Effects Of Medications:    Risks, benefits, and possible side effects of medications explained to Darian and he (or legal representative) verbalizes understanding and agreement for treatment.    Controlled Medication Discussion:     Not applicable      History of Present Illness     Darian is seen today for a follow up for Follow-up, Medication Management, Anxiety, and Manic Behavior. Since his last visit he has had no changes. He feels mood is relatively good. He has had some ups and downs with mild anxiety due to some stressors at work but foresees them resolving soon. He does note he is still struggling some with mild impulsivity and irritability.     He denies any suicidal ideation, intent or plan at present; denies any homicidal ideation, intent or plan at present.    He denies any auditory hallucinations, denies any visual hallucinations, denies any delusions.    He denies any side effects from current psychiatric medications.    HPI ROS Appetite Changes and Sleep:     He reports normal sleep, normal appetite, normal energy level    Review Of Systems: A review of systems is obtained and is negative except for the pertinent positives listed in HPI/Subjective above.      Current Rating  Scores:     Current PHQ-9   PHQ-2/9 Depression Screening    Little interest or pleasure in doing things: 0 - not at all  Feeling down, depressed, or hopeless: 1 - several days  Trouble falling or staying asleep, or sleeping too much: 1 - several days  Feeling tired or having little energy: 1 - several days  Poor appetite or overeatin - several days  Feeling bad about yourself - or that you are a failure or have let yourself or your family down: 1 - several days  Trouble concentrating on things, such as reading the newspaper or watching television: 1 - several days  Moving or speaking so slowly that other people could have noticed. Or the opposite - being so fidgety or restless that you have been moving around a lot more than usual: 0 - not at all  Thoughts that you would be better off dead, or of hurting yourself in some way: 0 - not at all  PHQ-9 Score: 6  PHQ-9 Interpretation: Mild depression       Current VENANCIO-7   VENANCIO-7 Flowsheet Screening      Flowsheet Row Most Recent Value   Over the last two weeks, how often have you been bothered by the following problems?     Feeling nervous, anxious, or on edge 1    Not being able to stop or control worrying 1    Worrying too much about different things 1    Trouble relaxing  1    Being so restless that it's hard to sit still 1    Becoming easily annoyed or irritable  1    Feeling afraid as if something awful might happen 1    How difficult have these problems made it for you to do your work, take care of things at home, or get along with other people?  Somewhat difficult    VENANCIO Score  7           Areas of Improvement: reviewed in HPI/Subjective Section and reviewed in Assessment and Plan Section    Past Medical History:   Diagnosis Date    Anxiety     Asthma     Asthma due to seasonal allergies     Benign essential hypertension 2019    Bipolar 2 disorder (HCC) 2015    CPAP (continuous positive airway pressure) dependence     Depression     Diverticulosis      History of colon polyps     History of colon polyps 12/08/2023    Hypertension     Hypertriglyceridemia 06/01/2022    Hypogonadism in male 06/01/2022    IFG (impaired fasting glucose)     Mixed hyperlipidemia 08/16/2019    Morbid obesity (HCC) 04/13/2021    Obesity     RUKHSANA on CPAP 10/14/2020    Osteoarthritis of hips, bilateral 02/17/2025    Osteoarthritis of left knee 02/17/2025    Soledad's syndrome     x 2, Urethritis, Conjunctivtis, Joint Pains        Past Surgical History:   Procedure Laterality Date    COLONOSCOPY      WISDOM TOOTH EXTRACTION       Allergies:   Allergies   Allergen Reactions    Lamotrigine Hives       Current Outpatient Medications   Medication Sig Dispense Refill    albuterol (ProAir HFA) 90 mcg/act inhaler Inhale 2 puffs every 6 (six) hours as needed for wheezing 8.5 g 0    atorvastatin (LIPITOR) 20 mg tablet Take 1 tablet (20 mg total) by mouth daily at bedtime 90 tablet 1    cholecalciferol (VITAMIN D3) 1,000 units tablet Take 1,000 Units by mouth daily      Cyanocobalamin (B-12) 1000 MCG TABS Take 1 tablet by mouth in the morning      fenofibrate 160 MG tablet TAKE 1 TABLET DAILY 90 tablet 1    Fluticasone-Salmeterol (Advair Diskus) 250-50 mcg/dose inhaler Inhale 1 puff every 12 (twelve) hours Rinse mouth after use. 60 blister 5    lisinopril (ZESTRIL) 30 mg tablet Take 1 tablet (30 mg total) by mouth daily 90 tablet 1    Magnesium 400 MG TABS Take 1 tablet by mouth in the morning      Omega-3 Fatty Acids (Fish Oil) 1000 MG CPDR Take 3,000 mg by mouth      sertraline (ZOLOFT) 50 mg tablet Take 1 tablet (50 mg total) by mouth daily 90 tablet 1    Zinc 50 MG TABS Take 50 mg by mouth daily       No current facility-administered medications for this visit.       Substance Abuse History:    Social History     Substance and Sexual Activity   Alcohol Use Yes    Alcohol/week: 1.0 standard drink of alcohol    Types: 1 Cans of beer per week    Comment: rarely drink     Social History      Substance and Sexual Activity   Drug Use No       Social History:    Social History     Socioeconomic History    Marital status: /Civil Union     Spouse name: Not on file    Number of children: 1    Years of education: Not on file    Highest education level: Not on file   Occupational History    Occupation: [employer]   Tobacco Use    Smoking status: Never    Smokeless tobacco: Never   Vaping Use    Vaping status: Never Used   Substance and Sexual Activity    Alcohol use: Yes     Alcohol/week: 1.0 standard drink of alcohol     Types: 1 Cans of beer per week     Comment: rarely drink    Drug use: No    Sexual activity: Yes     Partners: Female     Birth control/protection: None   Other Topics Concern    Not on file   Social History Narrative        Lives with wife    1 child - 1 son    [employer]     Social Drivers of Health     Financial Resource Strain: Not on file   Food Insecurity: Not on file   Transportation Needs: Not on file   Physical Activity: Not on file   Stress: Not on file   Social Connections: Not on file   Intimate Partner Violence: Not on file   Housing Stability: Not on file       Family Psychiatric History:     Family History   Problem Relation Age of Onset    Hypertension Mother     Hyperlipidemia Mother     Anxiety disorder Mother     Bipolar disorder Mother     Osteoarthritis Mother     Coronary artery disease Mother     Hearing loss Mother     Hypertension Father     Coronary artery disease Father 75    Hyperlipidemia Father     Bipolar disorder Father     Obesity Father     Depression Father     Heart failure Father     Kidney disease Father         CKD    COPD Father     Sleep apnea Father     Diabetes type II Father     Heart attack Father 75    Breast cancer Maternal Grandmother     Bipolar disorder Maternal Grandmother     Osteoarthritis Maternal Grandmother     Coronary artery disease Maternal Grandmother     Heart attack Maternal  Grandmother     No Known Problems Brother     Aneurysm Maternal Grandfather         Brain    Obesity Paternal Grandmother     Coronary artery disease Paternal Grandfather     Heart attack Paternal Grandfather     No Known Problems Son     Heart disease Neg Hx     Asthma Neg Hx     Arrhythmia Neg Hx     Anemia Neg Hx     Clotting disorder Neg Hx     Fainting Neg Hx        Medical History Reviewed by provider this encounter:  Tobacco  Allergies  Meds  Problems  Med Hx  Surg Hx  Fam Hx          Objective   There were no vitals taken for this visit.     Mental Status Evaluation:    Appearance age appropriate, casually dressed, looks stated age   Behavior pleasant, cooperative, calm   Speech normal rate, normal volume, normal pitch, spontaneous   Mood euthymic   Affect normal range and intensity, appropriate   Thought Processes organized, goal directed   Thought Content no overt delusions   Perceptual Disturbances: no auditory hallucinations, no visual hallucinations   Abnormal Thoughts  Risk Potential Suicidal ideation - None  Homicidal ideation - None  Potential for aggression - No   Orientation oriented to person, place, time/date, and situation   Memory recent and remote memory grossly intact   Consciousness alert and awake   Attention Span Concentration Span attention span and concentration appear shorter than expected for age   Intellect appears to be of average intelligence   Insight intact   Judgement intact   Muscle Strength and  Gait normal muscle strength and normal muscle tone, normal gait and normal balance   Motor activity no abnormal movements   Language no difficulty naming common objects, no difficulty repeating a phrase, no difficulty writing a sentence   Fund of Knowledge adequate knowledge of current events  adequate fund of knowledge regarding past history  adequate fund of knowledge regarding vocabulary    Pain none   Pain Scale 0       Laboratory Results: I have personally reviewed all  pertinent laboratory/tests results    Suicide/Homicide Risk Assessment:    Risk of Harm to Self:  The following ratings are based on assessment at the time of the interview  Based on today's assessment, Darian presents the following risk of harm to self: none    Risk of Harm to Others:  The following ratings are based on assessment at the time of the interview  Based on today's assessment, Darian presents the following risk of harm to others: none    The following interventions are recommended: Continue medication management. No other intervention changes indicated at this time.    Psychotherapy Provided:     Individual psychotherapy provided: No    Treatment Plan:    Completed and signed during the session: Not applicable - Treatment Plan to be completed by [facility] therapist    Goals: Progress towards Treatment Plan goals - Yes, progressing, as evidenced by subjective findings in HPI/Subjective Section and in Assessment and Plan Section    Note Share:    This note was not shared with the patient due to reasonable likelihood of causing patient harm    Visit Time  Visit Start Time: 11:30 AM  Visit Stop Time: 11:45 AM  Total Visit Duration:  15 minutes    Alejandro Carrasco 03/03/25

## 2025-03-03 NOTE — ASSESSMENT & PLAN NOTE
Stable - continue sertraline 50 mg qd; still waiting on psychological testing for bipolar vs. ADHD; continue talk therapy; f/u in 3-4 months

## 2025-03-12 ENCOUNTER — EVALUATION (OUTPATIENT)
Dept: PHYSICAL THERAPY | Facility: CLINIC | Age: 65
End: 2025-03-12
Payer: COMMERCIAL

## 2025-03-12 ENCOUNTER — TELEPHONE (OUTPATIENT)
Age: 65
End: 2025-03-12

## 2025-03-12 DIAGNOSIS — M54.16 LUMBAR RADICULOPATHY: Primary | ICD-10-CM

## 2025-03-12 DIAGNOSIS — M25.562 ACUTE PAIN OF LEFT KNEE: ICD-10-CM

## 2025-03-12 DIAGNOSIS — M25.551 RIGHT HIP PAIN: ICD-10-CM

## 2025-03-12 PROCEDURE — 97535 SELF CARE MNGMENT TRAINING: CPT

## 2025-03-12 PROCEDURE — 97161 PT EVAL LOW COMPLEX 20 MIN: CPT

## 2025-03-12 PROCEDURE — 97112 NEUROMUSCULAR REEDUCATION: CPT

## 2025-03-12 NOTE — PROGRESS NOTES
PT Evaluation     Today's date: 3/12/2025  Patient name: Aldair Juárez Jr.  : 1960  MRN: [id number]  Referring provider: Mayte Peacock DO  Dx:   Encounter Diagnosis     ICD-10-CM    1. Lumbar radiculopathy  M54.16       2. Acute pain of left knee  M25.562 Ambulatory Referral to Physical Therapy      3. Right hip pain  M25.551 Ambulatory Referral to Physical Therapy          Start Time: 1400  Stop Time: 1450  Total time in clinic (min): 50 minutes    Assessment  Impairments: abnormal gait, abnormal or restricted ROM, activity intolerance, impaired physical strength, lacks appropriate home exercise program, pain with function, weight-bearing intolerance, unable to perform ADL, participation limitations, activity limitations and endurance  Symptom irritability: moderate    Assessment details: Pt is a 65 yom presenting for initial eval of R sided LBP and hip pain. Current impairments include decreased lumbar extension ROM, decreased hip ext ROM, core and hip strength and endurance deficits, decreased frontal plane control at the hip in WB positions, and dec LE NM control. These limit the patient's ability to complete ADL's req extended standing, walking, or sitting in firm chairs, negotiating stairs, sleeping, and doing recreational activities at their prior level of function. Pt presentation is consistent w/ lumbar derangement w/ provisional ext DP. Skilled physical therapy is recommended at this time in order to address deficits and progress towards all goals outlined in this POC.    Comparable signs:  -lumbar ext  -hip abd MMT    Understanding of Dx/Px/POC: good     Prognosis: good    Goals  STG's:  1. In 2 weeks, pt will improve pain by 1 points on rating scale.  2. In 3 weeks, pt will improve ROM by 10 deg.  3. In 3 weeks, pt will improve MMT score by 1/2 grade.  4. In 4 weeks, pt will tolerate walking .5 miles w/out pain.    LTG's:  1. In 6 weeks, pt will improve pain to 0/10 with ADL's.  2. In 6  weeks, pt will have equal and pain free ROM bilaterally.  3. In 8 weeks, pt will tolerate walking 1 mile w/out pain.  4. In 8 weeks, pt will descend stairs w/out pain or giving out.    Plan  Patient would benefit from: skilled physical therapy  Planned modality interventions: low level laser therapy    Planned therapy interventions: manual therapy, joint mobilization, neuromuscular re-education, patient/caregiver education, self care, strengthening, stretching, therapeutic activities, therapeutic exercise, home exercise program, graded exercise, graded activity, gait training, functional ROM exercises, flexibility, balance/weight bearing training and activity modification    Frequency: 1x week  Duration in weeks: 8  Plan of Care beginning date: 3/12/2025  Plan of Care expiration date: 5/7/2025  Treatment plan discussed with: patient        Subjective Evaluation    History of Present Illness  Mechanism of injury: Pt is a 65 yom presenting for initial eval of R low back and hip pain. Pain began increasing at the low back, hip, and L knee over the past 1.5 years but has dealt with pain at these locations on and off for years. He had a steroid injection at the L knee and R hip about 2 weeks ago. The knee feels great, has no pain present anymore or issues there. The hip has had slight decrease in pain intensity from 8/10 prior to injection to 5/10 since. He describes the pain as achy and like a tight cramp. He also feels very tight at the low back and hip. The back and hip are aggravated after extended walking and standing or trying to sit up straight in a chair. He takes tylenol as needed to alleviate pain.                  Recurrent probem    Quality of life: good    Patient Goals  Patient goals for therapy: decreased pain, increased motion, increased strength, independence with ADLs/IADLs and return to sport/leisure activities  Patient goal: Pt wants to decrease pain so that he can walk with his wife and bike for  extended durations.  Pain  Current pain rating: 3  At best pain rating: 3  At worst pain ratin  Quality: dull ache, cramping, pressure, squeezing, pulling and discomfort  Relieving factors: change in position, rest and medications  Aggravating factors: sitting, standing, walking, running and stair climbing  Progression: no change    Treatments  Previous treatment: injection treatment        Objective     Concurrent Complaints  Negative for night pain, disturbed sleep, bladder dysfunction, bowel dysfunction and saddle (S4) numbness    Palpation     Right   Tenderness of the quadratus lumborum.     Additional Palpation Details  Tender R TFL and glut med/min    Tenderness     Right Hip   Tenderness in the PSIS.     Active Range of Motion     Lumbar   Flexion:  Restriction level: minimal  Extension:  with pain Restriction level: maximal  Left lateral flexion:  with pain Restriction level: moderate  Right lateral flexion:  with pain Restriction level: moderate    Joint Play     Hypomobile: T10, T11, T12, L1, L2, L3, L4, L5 and S1     Pain: L3, L4 and L5   Mechanical Assessment    Cervical      Thoracic      Lumbar    Standing extension: repeated movements  Pain location: centralized  Pain intensity: better  Pain level: abolished  Lying extension: repeated movements  Pain location: centralized  Pain intensity: worse  Pain level: produced    Strength/Myotome Testing     Left Hip   Planes of Motion   Flexion: 4+  Extension: 4  Abduction: 4    Right Hip   Planes of Motion   Flexion: 4+  Extension: 4  Abduction: 3+    Left Knee   Flexion: 4+  Extension: 4+    Right Knee   Flexion: 4+  Extension: 4+    Left Ankle/Foot   Dorsiflexion: 4+  Plantar flexion: 4+  Great toe extension: 4+    Right Ankle/Foot   Dorsiflexion: 4+  Plantar flexion: 4+  Great toe extension: 4+    Tests     Lumbar     Left   Negative crossed SLR, passive SLR and slump test.     Right   Negative crossed SLR, passive SLR and slump test.     Right Pelvic  Girdle/Sacrum   Positive: active SLR test.     Left Hip   Negative ANGELA and FADIR.     Right Hip   Positive ANGELA.   Negative FADIR.     General Comments:    Lower quarter screen   Hips: unremarkable             Precautions: N/a  Dx: lumbar derangement w/ provisional ext DP      Manuals 3/12                                                                Neuro Re-Ed             Repeated l/s ext in standing HEP              S/L hip abd HEP                                                                             Ther Ex             Bridge HEP                                                                                                       Ther Activity                                       Gait Training                                       Modalities

## 2025-03-12 NOTE — TELEPHONE ENCOUNTER
Writer spoke to patient and scheduled for psych testing with Dr.Gowarty CROWELL on Tuesday 3/18/2025 @ 10:00 am. For ADHD.     Writer then removed patient from the wait list due to being scheduled. No authorization is needed to testing.

## 2025-03-17 ENCOUNTER — TELEPHONE (OUTPATIENT)
Age: 65
End: 2025-03-17

## 2025-03-17 ENCOUNTER — PATIENT MESSAGE (OUTPATIENT)
Dept: FAMILY MEDICINE CLINIC | Facility: CLINIC | Age: 65
End: 2025-03-17

## 2025-03-17 DIAGNOSIS — I10 BENIGN ESSENTIAL HYPERTENSION: ICD-10-CM

## 2025-03-17 DIAGNOSIS — E78.2 MIXED HYPERLIPIDEMIA: ICD-10-CM

## 2025-03-17 RX ORDER — LISINOPRIL 30 MG/1
30 TABLET ORAL DAILY
Qty: 90 TABLET | Refills: 1 | Status: SHIPPED | OUTPATIENT
Start: 2025-03-17

## 2025-03-17 RX ORDER — ATORVASTATIN CALCIUM 20 MG/1
20 TABLET, FILM COATED ORAL
Qty: 90 TABLET | Refills: 1 | Status: SHIPPED | OUTPATIENT
Start: 2025-03-17

## 2025-03-17 NOTE — TELEPHONE ENCOUNTER
RN reached out to pharmacy post receipt of patient's MYC message regarding problem with reiflls. Spoke with Scott and he confirmed receipt of new order for sertraline (ZOLOFT) 50 mg tablet but reports he requires new orders for:  atorvastatin (LIPITOR) 20 mg tablet  and  lisinopril (ZESTRIL) 30 mg tablet    Please send new orders to [facility].

## 2025-03-18 ENCOUNTER — TELEMEDICINE (OUTPATIENT)
Age: 65
End: 2025-03-18
Payer: COMMERCIAL

## 2025-03-18 DIAGNOSIS — F41.1 GENERALIZED ANXIETY DISORDER: Primary | ICD-10-CM

## 2025-03-18 DIAGNOSIS — F31.81 BIPOLAR 2 DISORDER (HCC): ICD-10-CM

## 2025-03-18 PROCEDURE — 90791 PSYCH DIAGNOSTIC EVALUATION: CPT | Performed by: COUNSELOR
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
## 2025-03-19 ENCOUNTER — APPOINTMENT (OUTPATIENT)
Dept: PHYSICAL THERAPY | Facility: CLINIC | Age: 65
End: 2025-03-19
Payer: COMMERCIAL

## 2025-03-20 ENCOUNTER — CLINICAL SUPPORT (OUTPATIENT)
Age: 65
End: 2025-03-20
Payer: COMMERCIAL

## 2025-03-20 DIAGNOSIS — F31.81 BIPOLAR 2 DISORDER (HCC): ICD-10-CM

## 2025-03-20 DIAGNOSIS — F41.1 GENERALIZED ANXIETY DISORDER: Primary | ICD-10-CM

## 2025-03-20 PROCEDURE — 96138 PSYCL/NRPSYC TECH 1ST: CPT | Performed by: COUNSELOR

## 2025-03-20 PROCEDURE — 96139 PSYCL/NRPSYC TST TECH EA: CPT | Performed by: COUNSELOR

## 2025-03-20 NOTE — PSYCH
Aggie BOSE, administered psychological testing to Aldair Juárez on 3/20/2025.    Start time: 7:59 AM  End time: 11:21 AM  Scoring time: 30 minutes  Total time: 232 minutes (3h 52m)    Weschler Adult Intelligence Scale (WAIS-IV)  Scale Sum of Scaled Score Composite Score Percentile Rank   Verbal Comprehension (VCI) 40 118 88   Perceptual Reasoning (JUAN) 22 84 14   Working Memory (WMI) 14 83 13   Processing Speed (PSI) 16 89 23   Full Scale (FSIQ) 92 94 34       Lela Continuous Performance Test  Variable Type  Measure T-Score   Detectability  d' 50   Error Type Omissions 46    Commissions 51    Perseverations 44   Reaction Time Statistic  HRT 44    HRT SD 46     Variability 48    HRT Block Change 44    HRT PATTI Change 59     Lela Continuous Auditory Test of Attention  Variable Type  Measure T-Score   Detectability  d' 44   Error Type Omissions 44    Commissions 47    Perseverative Commissions 46   Reaction Time Statistic  HRT 51    HRT SD 39    HRT Block Change 56     BAARS SELF  Current  Total adhd score: 50  Section 1 symptom count: 20  Sum of section 2 and 3 symptom count: 18  Total adhd symptom count: 38     Childhood   Total score: 55  Symptom count: 43    Behavior Rating Inventory of Executive Functioning (BRIEF)-Adult Version (BRIEF-A)  Scale/Index Raw Score T Score    Inhibit 21 85   Shift 13 73   Emotional Control 29 94   Self-Monitor 18 94   Behavioral Regulation Index 81 97   Initiate 14 61   Working Memory 23 99   Plan/Organize 23 81   Task Monitor 16 88   Organization of Materials 18 72   [place] cognition Index  94 85   Global Executive Composite  175 92      Validity Scale  Raw Score    Negativity 5   Infrequency  0   Inconsistency  0     Mood Disorder Questionnaire (MDQ)    [place] Multiphasic Personality Inventory-3 (MMPI)  Protocol validity   Content Non-Responsiveness   CNS 0, CRIN 63, VRIN 68, TAMARA 50  Over-Reporting  F 53, Fp 41, Fs 42, FBS 51, RBS 61  Under-Reporting   L 56, K  56  Substantive scales   Somatic/Cognitive Dysfunction  RC1 46, MLS 48, NUC 52, EAT 56, COG 81  Emotional Dysfunction  ZOE 55  RC d 51, BOLA 44, HLP 51, SFD 59, NFC 52, RC2 68, INTR 69, RC7 52, STR 68, WRY 44, CMP 46, ARX 53, ANP 66, BRF 43, NEGE 49  Thought Dysfunction  THD 49  RC6 40, RC8 49, PSYC 52  Behavioral Dysfunction  BXD 50  RC4 48, FML 48, JCP 56, SUB 39, RC9 51, IMP 76, ACT 49, AGG 39, TAVO 39, DISC 51  Interpersonal Functioning    SFI 37, DOM 34, AGGR 31, DSF 48, SHERWIN 60, SHY 49    MCMI-IV  VALIDITY    Modifying Indices    Disclosure  Desirability  Debasement 45  6  6   PERSONALITY    Clinical Personality Patterns    AASchd  SRAvoid  DFMelan  DADepn  SPHistr  EETurbu  CENarc  ADAntis  ADSadis  RCComp  DRNegat  AAMasoc 9  8  8  19  12  5  8  12  10  7  8  5   Severe Personality Pathology    ESSchizoph  UBCycloph  MPParaph 9  7  0   PSYCHOPATHOLOGY    Clinical Syndromes    GENanx  SOMsym  BIPspe  PERdep  ALCuse  DRGuse  P-Tstr 4  7  12  5  1  3  3   Severe Clinical Syndromes    SCHspe  MAJdep  DELdis 6  2  0   FACET SCALES    SRAvoid    Interpersonally Aversive  Alienated Self-Image  Vexatious Content 4  5  5   DRNegat    Expressively Embittered  Discontented Self-Image  Temperamentally Irritable 7  6  1   DADepn    Expressively Puerile  Interpersonally Submissive  Inept Self-Image 5  2  5     CPT 29054: 1 unit  CPT 22137: 6 units

## 2025-03-24 ENCOUNTER — SOCIAL WORK (OUTPATIENT)
Dept: BEHAVIORAL/MENTAL HEALTH CLINIC | Facility: CLINIC | Age: 65
End: 2025-03-24
Payer: COMMERCIAL

## 2025-03-24 DIAGNOSIS — F32.9 REACTIVE DEPRESSION: ICD-10-CM

## 2025-03-24 DIAGNOSIS — F43.10 POST TRAUMATIC STRESS DISORDER (PTSD): Primary | ICD-10-CM

## 2025-03-24 PROCEDURE — 90834 PSYTX W PT 45 MINUTES: CPT | Performed by: SOCIAL WORKER

## 2025-03-24 NOTE — PSYCH
"Behavioral Health Psychotherapy Progress Note    Psychotherapy Provided: Individual Psychotherapy     1. Post traumatic stress disorder (PTSD)        2. Reactive depression            Goals addressed in session: Goal 1 and Goal 2     DATA: Pt reports great progress with marriage. Communication improved. Division of labor much improved. Pt and wife get together every day to talk about how they are doing, what the plan is for the day. Pt reports it has helped enormously. Pt and writer discussed main goal of this session which is to utilize the intervention of externalizing thoughts through communicating. For example, pt will often think things (this meal is delicious) but not verbalize it, so wife does not know she is appreciated. Pt's is going to work on this over the next two weeks. Pt reports he and his wife are working together as well as they ever have. No outbursts, anger episodes, pt a little expansive today but fairly consistent with previous sessions.         During this session, this clinician used the following therapeutic modalities: Cognitive Processing Therapy and Solution-Focused Therapy    Substance Abuse was not addressed during this session. If the client is diagnosed with a co-occurring substance use disorder, please indicate any changes in the frequency or amount of use: . Stage of change for addressing substance use diagnoses: No substance use/Not applicable    ASSESSMENT:  Darian Juárez Jr. presents with a Euthymic/ normal mood.     his affect is Normal range and intensity, which is congruent, with his mood and the content of the session. The client has made progress on their goals.     Darian Juárez Jr. presents with a none risk of suicide, none risk of self-harm, and none risk of harm to others.    For any risk assessment that surpasses a \"low\" rating, a safety plan must be developed.    A safety plan was indicated: no  If yes, describe in detail NA    PLAN: Between sessions, Darian Juárez "  will externalize positive thoughts, and things which he feels he needs to discuss.. At the next session, the therapist will use Cognitive Processing Therapy, Solution-Focused Therapy, and Supportive Psychotherapy to continue to address ongoing impacts of trauma, impulsivity and difficulties managing emotions.     Behavioral Health Treatment Plan and Discharge Planning: Darian Juárez Jr. is aware of and agrees to continue to work on their treatment plan. They have identified and are working toward their discharge goals. yes    Depression Follow-up Plan Completed: Not applicable    Visit start and stop times:    03/24/25  Start Time: 1300  Stop Time: 1345  Total Visit Time: 45 minutes

## 2025-03-26 ENCOUNTER — OFFICE VISIT (OUTPATIENT)
Dept: PHYSICAL THERAPY | Facility: CLINIC | Age: 65
End: 2025-03-26
Payer: COMMERCIAL

## 2025-03-26 DIAGNOSIS — M25.551 RIGHT HIP PAIN: ICD-10-CM

## 2025-03-26 DIAGNOSIS — M25.562 ACUTE PAIN OF LEFT KNEE: Primary | ICD-10-CM

## 2025-03-26 DIAGNOSIS — M54.16 LUMBAR RADICULOPATHY: ICD-10-CM

## 2025-03-26 PROCEDURE — 97140 MANUAL THERAPY 1/> REGIONS: CPT

## 2025-03-26 PROCEDURE — 97112 NEUROMUSCULAR REEDUCATION: CPT

## 2025-03-26 NOTE — PROGRESS NOTES
Daily Note     Today's date: 3/26/2025  Patient name: Aldair Juárez Jr.  : 1960  MRN: [id number]  Referring provider: Mayte Peacock DO  Dx:   Encounter Diagnosis     ICD-10-CM    1. Acute pain of left knee  M25.562       2. Right hip pain  M25.551       3. Lumbar radiculopathy  M54.16                      Subjective: Pt states that his back is doing about the same. He was not consistent with the home program the past 2 weeks and knows he needs to prioritize the exercises to get lasting relief. No leg pain present today but does have 3/10 R sided LBP.      Objective: See treatment diary below      Assessment: Tolerated treatment well. Patient  had improved lumbar extension ROM after press ups but symptoms stayed at 3/10. After grade 4 lumbar PA's to L3-5 pain was about 2/10. After TE and laser pt left session w/ no pain present.       Plan: Continue per plan of care.      Precautions: N/a  Dx: lumbar derangement w/ provisional ext DP      Manuals 3/12 3/26           Prone press up + OP  L4/5 2x5           Laser  5400J           Lumbar PA's  G4 L3-5 2x10                         Neuro Re-Ed             Repeated l/s ext in standing HEP   3x10           Prone press up  2x10           S/L hip abd HEP                                                                             Ther Ex             Bridge HEP 3x10           Clamshell  Supine BTB 3x10                                                                                         Ther Activity                                       Gait Training                                       Modalities

## 2025-04-02 ENCOUNTER — OFFICE VISIT (OUTPATIENT)
Dept: PHYSICAL THERAPY | Facility: CLINIC | Age: 65
End: 2025-04-02
Payer: COMMERCIAL

## 2025-04-02 DIAGNOSIS — M25.551 RIGHT HIP PAIN: ICD-10-CM

## 2025-04-02 DIAGNOSIS — M54.16 LUMBAR RADICULOPATHY: ICD-10-CM

## 2025-04-02 DIAGNOSIS — M25.562 ACUTE PAIN OF LEFT KNEE: Primary | ICD-10-CM

## 2025-04-02 PROCEDURE — 97110 THERAPEUTIC EXERCISES: CPT

## 2025-04-02 PROCEDURE — 97140 MANUAL THERAPY 1/> REGIONS: CPT

## 2025-04-02 PROCEDURE — 97112 NEUROMUSCULAR REEDUCATION: CPT

## 2025-04-02 NOTE — PROGRESS NOTES
Daily Note     Today's date: 2025  Patient name: Aldair Juárez Jr.  : 1960  MRN: [id number]  Referring provider: Mayte Peacock DO  Dx:   Encounter Diagnosis     ICD-10-CM    1. Acute pain of left knee  M25.562       2. Right hip pain  M25.551       3. Lumbar radiculopathy  M54.16           Start Time: 1400  Stop Time: 1431  Total time in clinic (min): 31 minutes    Subjective: Pt states his low back pain has been less intense. He had instances of hip achy pain and giving way yesterday but today has been better so far. He was able to do yard work this morning of raking, bending, lifting, etc without increased pain but does feel stiff/tight.       Objective: See treatment diary below      Assessment: Tolerated treatment well. Patient  had improved lumbar extension ROM after grade 4 PA's and press up with OP. No LBP present for duration of session. R hip fatigued quickly w/ loading into abduction.        Plan: Continue per plan of care.      Precautions: N/a  Dx: lumbar derangement w/ provisional ext DP      Manuals 3/12 3/26 4/2          Prone press up + OP  L4/5 2x5 L4/5 2x5          Laser  5400J           Lumbar PA's  G4 L3-5 2x10  G4 L2-S1  2x10                       Neuro Re-Ed             Repeated l/s ext in standing HEP   3x10 3x10          Prone press up  2x10 2x10          S/L hip abd HEP  2x10 ea          DLS hip abd   2x10                                                              Ther Ex             Bridge HEP 3x10 3x10          Clamshell  Supine BTB 3x10 Supine BTB 3x12                                                                                        Ther Activity                                       Gait Training                                       Modalities

## 2025-04-08 ENCOUNTER — TELEMEDICINE (OUTPATIENT)
Age: 65
End: 2025-04-08
Payer: COMMERCIAL

## 2025-04-08 DIAGNOSIS — F41.1 GENERALIZED ANXIETY DISORDER: Primary | ICD-10-CM

## 2025-04-08 DIAGNOSIS — F31.81 BIPOLAR 2 DISORDER (HCC): ICD-10-CM

## 2025-04-08 PROCEDURE — 96130 PSYCL TST EVAL PHYS/QHP 1ST: CPT | Performed by: COUNSELOR

## 2025-04-08 PROCEDURE — 96131 PSYCL TST EVAL PHYS/QHP EA: CPT | Performed by: COUNSELOR

## 2025-04-08 NOTE — PSYCH
Virtual AWV Consent    Reason for visit is testing feedback    Provider located [facility]  [facility]  [address] [place] [id number] [phone]    Recent Visits  No visits were found meeting these conditions.  Showing recent visits within past 7 days and meeting all other requirements  Future Appointments  No visits were found meeting these conditions.  Showing future appointments within next 150 days and meeting all other requirements       Administrative Statements   Encounter provider Lucero Hendrix PsyD    The Patient is located at Home and in the following state in which I hold an active license [place].    The patient was identified by name and date of birth. Aldair Juárez  was informed that this is a telemedicine visit and that the visit is being conducted through the Epic Embedded platform. He agrees to proceed..  My office door was closed. No one else was in the room.  He acknowledged consent and understanding of privacy and security of the video platform. The patient has agreed to participate and understands they can discontinue the visit at any time.      Psychological Evaluation  [address] [address]  P: [phone] ? F: [phone]      Feedback from Psychological Evaluation    Mr. Juárez returned for a feedback appointment to review the findings and recommendations from a psychological evaluation. The patient was accompanied by his wife who participated in the feedback appointment with patient permission. Findings from the evaluation, indicated Bipolar and Anxiety. Recommendations were reviewed (see evaluation report from 3/18/2025 for full details). The patient was given the opportunity to ask questions and expressed satisfaction with answers provided. Contact information for this provider was given to the patient and he was encouraged to contact  the office with any further questions or concerns.     Lucero Hendrix PsyD  Clinical Psychologist  [place] Licensed Psychologist  Lic.#[id number]      Tasks Conducted Total Time Spent Associated CPT Codes   Report Writing 135 min.    96131 x 2 units       Chart Review 20 min. 96130 x 1 unit  89707 0 units   Interpretation of Test Data 20 min.    Feedback to Patient/Family Members 20 min.

## 2025-04-09 ENCOUNTER — OFFICE VISIT (OUTPATIENT)
Dept: PHYSICAL THERAPY | Facility: CLINIC | Age: 65
End: 2025-04-09
Payer: COMMERCIAL

## 2025-04-09 DIAGNOSIS — M25.551 RIGHT HIP PAIN: ICD-10-CM

## 2025-04-09 DIAGNOSIS — M54.16 LUMBAR RADICULOPATHY: Primary | ICD-10-CM

## 2025-04-09 PROCEDURE — 97140 MANUAL THERAPY 1/> REGIONS: CPT

## 2025-04-09 PROCEDURE — 97112 NEUROMUSCULAR REEDUCATION: CPT

## 2025-04-09 PROCEDURE — 97110 THERAPEUTIC EXERCISES: CPT

## 2025-04-09 NOTE — PROGRESS NOTES
Daily Note     Today's date: 2025  Patient name: Aldair Juárez Jr.  : 1960  MRN: [id number]  Referring provider: Mayte Peacock DO  Dx:   Encounter Diagnosis     ICD-10-CM    1. Lumbar radiculopathy  M54.16       2. Right hip pain  M25.551                       Subjective: Pt states that his R hip is giving him lot of pain at night when he's sleeping. The pain is worst when laying on his R side but once pain increases, will have pain laying on either side. His low back is stiff but has not bee painful.       Objective: See treatment diary below      Assessment: Tolerated treatment well. Patient  fatigued very quickly at the R hip with w/ exercises targeting glut med and min. Unable to do resisted hip abd against gravity in long lever positions w/out sig increased hip pain.        Plan: Continue per plan of care.      Precautions: N/a  Dx: lumbar derangement w/ provisional ext DP      Manuals 3/12 3/26 4/2 4/9         Prone press up + OP  L4/5 2x5 L4/5 2x5 L4/5 3x5         Laser  [id number]           Lumbar PA's  G4 L3-5 2x10  G4 L2-S1  2x10 G4   L3-S1  2x10                      Neuro Re-Ed             Repeated l/s ext in standing HEP   3x10 3x10 3x10         Prone press up  2x10 2x10 2x10         S/L hip abd HEP  2x10 ea np         DLS hip abd   2x10 ea 2x10 ea                                                             Ther Ex             Bridge HEP 3x10 3x10 BTB 3x10         Clamshell  Supine BTB 3x10 Supine BTB 3x12 Supine BTB 3x12    SL  2x10                                                                                       Ther Activity                                       Gait Training                                       Modalities

## 2025-04-10 ENCOUNTER — DOCUMENTATION (OUTPATIENT)
Age: 65
End: 2025-04-10

## 2025-04-10 NOTE — PROGRESS NOTES
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 applicable. Darian has a history of Bipolar Disorder, type I, PTSD, and ADHD, Unspecified Type. Primary complaints include: difficulty paying attention, following instructions, acting without thinking, poor planning, time management and organization, and having a low tolerance for frustration, has feelings of grandiosity, and has a family history of bipolar disorder, family members that have  by suicide and have been institutionalized.    When considering diagnoses, Darian appears to be experiencing symptoms of Bipolar based on results from the MCMI-IV and MDQ. He also appears to be experiencing symptoms related to Anxiety based on the clinical interview as well as the MMPI-3. He does not meet diagnostic criteria of ADHD based on the CPT-3 and SHARYN.     Diagnostic Impressions  (F31.2) Bipolar I Disorder  (F41.1) General Anxiety Disorder    Treatment Recommendations  Darian is encouraged to continue participating in cognitive behavior therapy to address symptoms related to anxiety and mood. He would benefit from cognitive behavioral therapies to help identify thoughts and behavioral patterns that may be contributing to and or maintaining his current clinical symptomology.    Darian would benefit from relaxation strategies such as mindfulness skills, progressive muscle relaxation as well as grounding techniques.  Darian should continue to consult with his prescribing provider.

## 2025-04-15 NOTE — RESULT ENCOUNTER NOTE
Detail Level: Generalized
Unstable labs - will review with patient at upcoming appointment  Elevated fasting glucose - Pending HgA1C  Hypertriglyceridemia - Recommend lifestyle modifications  To consider   Fish Oil?
Unstable labs - will review with patient at upcoming appointment  IFG - New  To consider Metformin, Pre-DM education?
Detail Level: Simple
Detail Level: Detailed
Detail Level: Zone

## 2025-04-16 ENCOUNTER — APPOINTMENT (OUTPATIENT)
Dept: PHYSICAL THERAPY | Facility: CLINIC | Age: 65
End: 2025-04-16
Payer: COMMERCIAL

## 2025-04-21 ENCOUNTER — SOCIAL WORK (OUTPATIENT)
Dept: BEHAVIORAL/MENTAL HEALTH CLINIC | Facility: CLINIC | Age: 65
End: 2025-04-21
Payer: COMMERCIAL

## 2025-04-21 DIAGNOSIS — F43.10 POST TRAUMATIC STRESS DISORDER (PTSD): Primary | ICD-10-CM

## 2025-04-21 PROCEDURE — 90834 PSYTX W PT 45 MINUTES: CPT | Performed by: SOCIAL WORKER

## 2025-04-22 NOTE — PSYCH
1. Post traumatic stress disorder (PTSD)              Data: Pt reports having a buying spree this past week, which is something pt experiences from time to time. Pt used the skills discussed in session to identify that this is a breach of trust and he has to hold himself accountable. Rather than trying to hide, pt was honest with his wife, and talked about how he was going to remedy the situation. Wife did a great job of not shaming pt but focused on him apologizing and making amends. Pt has a goal of letting his wife know how much that meant to him as he has often been shamed in the past, which encouraged him to avoid and hide and not take accountability for his actions. PT reports both he and his wife communicated in a very mature way, and the event was resolved in 30 minutes as opposed to several days before pt learnt how to communicate better.     Pt also notes that these kind of impulse buys tend to happen at night after several nights of poor sleep, and often after pt has had a good day. Pt states this seems to contribute to the blowing through of inhibitions when it comes to impulse buys. PT is going to make note of this and work not opening his computer when these conditions are present. Pt continues to make excellent progress with likely dc after next session.         Plan: Follow up in 2 weeks.     Psychotherapy Provided: Individual Psychotherapy 45 minutes     Length of time in session: 45 minutes, follow up in 2 weeks    Goals addressed in session: Goal 1 and Goal 2     Pain:      none    0    Current suicide risk : Low     Pt is future oriented and not suicidal.       Behavioral Health Treatment Plan [facility]: Diagnosis and Treatment Plan explained to lCarissa Romo relates understanding diagnosis and is agreeable to Treatment Plan. Yes     Visit start and stop times:       04/21/25  Start Time: 1300  Stop Time: 1345  Total Visit Time: 45 minutes

## 2025-05-14 ENCOUNTER — TELEPHONE (OUTPATIENT)
Age: 65
End: 2025-05-14

## 2025-05-14 NOTE — TELEPHONE ENCOUNTER
Patient contacted the office to schedule a follow up visit with provider. Patient is now scheduled for 5/15/25  at 12:30p in office.

## 2025-05-15 ENCOUNTER — OFFICE VISIT (OUTPATIENT)
Dept: PSYCHIATRY | Facility: CLINIC | Age: 65
End: 2025-05-15
Payer: COMMERCIAL

## 2025-05-15 DIAGNOSIS — F41.1 GENERALIZED ANXIETY DISORDER: ICD-10-CM

## 2025-05-15 DIAGNOSIS — F31.81 BIPOLAR 2 DISORDER (HCC): Primary | ICD-10-CM

## 2025-05-15 DIAGNOSIS — Z79.899 ON VALPROIC ACID THERAPY: ICD-10-CM

## 2025-05-15 DIAGNOSIS — F43.10 POST TRAUMATIC STRESS DISORDER (PTSD): ICD-10-CM

## 2025-05-15 PROCEDURE — 99214 OFFICE O/P EST MOD 30 MIN: CPT | Performed by: PHYSICIAN ASSISTANT

## 2025-05-15 RX ORDER — DIVALPROEX SODIUM 500 MG/1
500 TABLET, FILM COATED, EXTENDED RELEASE ORAL
Qty: 30 TABLET | Refills: 1 | Status: SHIPPED | OUTPATIENT
Start: 2025-05-15 | End: 2025-07-14

## 2025-05-15 NOTE — ASSESSMENT & PLAN NOTE
Not at goal - trial divalproex  mg qhs; continue sertraline 50 mg qd; continue talk therapy; send update via MyChart/phone call in 2 weeks; f/u in 4-6 weeks

## 2025-05-15 NOTE — ASSESSMENT & PLAN NOTE
VA level ordered, to be done prior to f/u in 6 weeks    Orders:    Valproic Acid, Total & Free; Future

## 2025-05-15 NOTE — PSYCH
MEDICATION MANAGEMENT NOTE    Name: Aldair Juárez Jr.      : 1960      MRN: [id number]  Encounter Provider: Alejandro Carrasco  Encounter Date: 5/15/2025   Encounter department: [facility]    Insurance: Payor: BLUE CROSS / Plan: [employer] BC PLAN 361 / Product Type: Blue Fee for Service /      Reason for Visit:   Chief Complaint   Patient presents with    Follow-up    Medication Management    Anxiety   :  Assessment & Plan  Bipolar 2 disorder (HCC)  Not at goal - trial divalproex  mg qhs; continue sertraline 50 mg qd; continue talk therapy; send update via [employer]/phone call in 2 weeks; f/u in 4-6 weeks    Orders:    divalproex sodium (Depakote ER) 500 mg 24 hr tablet; Take 1 tablet (500 mg total) by mouth daily at bedtime    Generalized anxiety disorder  Not at goal - trial divalproex  mg qhs; continue sertraline 50 mg qd; continue talk therapy; send update via [employer]/phone call in 2 weeks; f/u in 4-6 weeks         Post traumatic stress disorder (PTSD)  Not at goal - trial divalproex  mg qhs; continue sertraline 50 mg qd; continue talk therapy; send update via MyChart/phone call in 2 weeks; f/u in 4-6 weeks         On valproic acid therapy  VA level ordered, to be done prior to f/u in 6 weeks    Orders:    Valproic Acid, Total & Free; Future      He did have psychological testing since last visit which confirmed dx of bipolar disorder and anxiety and r/o primary attention disorder. He does note that he did have an episode of hypomania since last visit where he had difficulty sleeping and problems with impulse control. This then shifted into a depressive episode where he still is now. Advised trial of divalproex  mg qhs. He will update me in 2 weeks via MyChart/phone and if needed can increase further. VA level sent to lab to be done prior to 6 week f/u. PARQ completed including GI distress, tremor, weight gain, and hepatic risks, blood  dyscrasias/bone marrow effects, SIADH, pancreatitis, Allergic reactions, worsened depression/suicidality, others including need for blood testing and monitoring.   FEMALES - discussed andrenergic effects, PCOS, teratogenicity if of childbearing age as well.      Treatment Recommendations:    Continue current medication:     - sertraline 50 mg qd    Start medication:    - divalproex  mg qhs    Educated about diagnosis and treatment modalities. Verbalizes understanding and agreement with the treatment plan.  Discussed self monitoring of symptoms, and symptom monitoring tools.  Discussed medications and if treatment adjustment was needed or desired.  Medication management every 6 weeks  Aware of 24 hour and weekend coverage for urgent situations accessed by calling [facility] main practice number  Continue psychotherapy with [facility] therapist Aries Jenkins  I am scheduling this patient out for greater than 3 months: No    Medications Risks/Benefits:      Risks, Benefits And Possible Side Effects Of Medications:    Risks, benefits, and possible side effects of medications explained to Darian and he (or legal representative) verbalizes understanding and agreement for treatment.    Controlled Medication Discussion:     Not applicable      History of Present Illness     Darian is seen today for a follow up for Follow-up, Medication Management, and Anxiety, Bipolar Disorder type II, and PTSD. Since his last visit he did get the psychological testing done which showed evidence of bipolar disorder and anxiety and that he did not meet criteria for primary focus d/o. He unfortunately did have an episode of hypomania where he had a lot of energy, was barely sleeping, and was impulse buying things at 4 AM. He then shifted into more of a depressive episode a few weeks ago and is still feeling depressed with decreased energy and increased appetite. He is open to trying something.     Past medication  trials: Lamictal (rash), Cymbalta (zora), Wellbutrin, Lithium, Seroquel (no benefit), Abilify (no benefit), Rexulti     He denies any suicidal ideation, intent or plan at present; denies any homicidal ideation, intent or plan at present.    He denies any auditory hallucinations, denies any visual hallucinations, denies any delusions.    He denies any side effects from current psychiatric medications.    HPI ROS Appetite Changes and Sleep:     He reports difficulty sleeping, increased appetite, decreased energy    Review Of Systems: A review of systems is obtained and is negative except for the pertinent positives listed in HPI/Subjective above.      Current Rating Scores:     Current PHQ-9   PHQ-2/9 Depression Screening    Little interest or pleasure in doing things: 1 - several days  Feeling down, depressed, or hopeless: 3 - nearly every day  Trouble falling or staying asleep, or sleeping too much: 3 - nearly every day  Feeling tired or having little energy: 3 - nearly every day  Poor appetite or overeatin - several days  Feeling bad about yourself - or that you are a failure or have let yourself or your family down: 2 - more than half the days  Trouble concentrating on things, such as reading the newspaper or watching television: 1 - several days  Moving or speaking so slowly that other people could have noticed. Or the opposite - being so fidgety or restless that you have been moving around a lot more than usual: 1 - several days  Thoughts that you would be better off dead, or of hurting yourself in some way: 0 - not at all  PHQ-9 Score: 15  PHQ-9 Interpretation: Moderately severe depression       Current VENANCIO-7   VENANCIO-7 Flowsheet Screening      Flowsheet Row Most Recent Value   Over the last two weeks, how often have you been bothered by the following problems?     Feeling nervous, anxious, or on edge 1    Not being able to stop or control worrying 1    Worrying too much about different things 1    Trouble  relaxing  1    Being so restless that it's hard to sit still 1    Becoming easily annoyed or irritable  2    Feeling afraid as if something awful might happen 0    How difficult have these problems made it for you to do your work, take care of things at home, or get along with other people?  Very difficult    VENANCIO Score  7             Areas of Improvement: reviewed in HPI/Subjective Section and reviewed in Assessment and Plan Section      Past Medical History:   Diagnosis Date    Anxiety     Asthma     Asthma due to seasonal allergies     Benign essential hypertension 08/16/2019    Bipolar 2 disorder (HCC) 02/20/2015    CPAP (continuous positive airway pressure) dependence     Depression     Diverticulosis     History of colon polyps     History of colon polyps 12/08/2023    Hypertension     Hypertriglyceridemia 06/01/2022    Hypogonadism in male 06/01/2022    IFG (impaired fasting glucose)     Mixed hyperlipidemia 08/16/2019    Morbid obesity (HCC) 04/13/2021    Obesity     RUKHSANA on CPAP 10/14/2020    Osteoarthritis of hips, bilateral 02/17/2025    Osteoarthritis of left knee 02/17/2025    Soledad's syndrome     x 2, Urethritis, Conjunctivtis, Joint Pains     Past Surgical History:   Procedure Laterality Date    COLONOSCOPY      WISDOM TOOTH EXTRACTION       Allergies: Allergies[1]    Current Outpatient Medications   Medication Instructions    albuterol (ProAir HFA) 90 mcg/act inhaler 2 puffs, Inhalation, Every 6 hours PRN    atorvastatin (LIPITOR) 20 mg, Oral, Daily at bedtime    cholecalciferol (VITAMIN D3) 1,000 Units, Daily    Cyanocobalamin (B-12) 1000 MCG TABS 1 tablet, Daily    divalproex sodium (DEPAKOTE ER) 500 mg, Oral, Daily at bedtime    fenofibrate 160 mg, Oral, Daily    Fish Oil 3,000 mg    Fluticasone-Salmeterol (Advair Diskus) 250-50 mcg/dose inhaler 1 puff, Inhalation, Every 12 hours, Rinse mouth after use.    lisinopril (ZESTRIL) 30 mg, Oral, Daily    Magnesium 400 MG TABS 1 tablet, Daily     sertraline (ZOLOFT) 50 mg, Oral, Daily    Zinc 50 mg, Daily        Substance Abuse History:    Tobacco, Alcohol and Drug Use History     Tobacco Use    Smoking status: Never    Smokeless tobacco: Never   Vaping Use    Vaping status: Never Used   Substance Use Topics    Alcohol use: Yes     Alcohol/week: 1.0 standard drink of alcohol     Types: 1 Cans of beer per week     Comment: rarely drink    Drug use: No     Alcohol Use: Not At Risk (6/8/2021)    AUDIT-C     Frequency of Alcohol Consumption: Monthly or less     Average Number of Drinks: 1 or 2     Frequency of Binge Drinking: Never       Social History:    Social History     Socioeconomic History    Marital status: /Civil Union     Spouse name: Not on file    Number of children: 1    Years of education: Not on file    Highest education level: Not on file   Occupational History    Occupation: NETWORK ENGENEER   Other Topics Concern    Not on file   Social History Narrative        Lives with wife    1 child - 1 son    NETWORK ENGENEER    [employer]        Family Psychiatric History:     Family History   Problem Relation Age of Onset    Hypertension Mother     Hyperlipidemia Mother     Anxiety disorder Mother     Bipolar disorder Mother     Osteoarthritis Mother     Coronary artery disease Mother     Hearing loss Mother     Hypertension Father     Coronary artery disease Father 75    Hyperlipidemia Father     Bipolar disorder Father     Obesity Father     Depression Father     Heart failure Father     Kidney disease Father         CKD    COPD Father     Sleep apnea Father     Diabetes type II Father     Heart attack Father 75    Breast cancer Maternal Grandmother     Bipolar disorder Maternal Grandmother     Osteoarthritis Maternal Grandmother     Coronary artery disease Maternal Grandmother     Heart attack Maternal Grandmother     No Known Problems Brother     Aneurysm Maternal Grandfather         Brain    Obesity Paternal Grandmother      Coronary artery disease Paternal Grandfather     Heart attack Paternal Grandfather     No Known Problems Son     Heart disease Neg Hx     Asthma Neg Hx     Arrhythmia Neg Hx     Anemia Neg Hx     Clotting disorder Neg Hx     Fainting Neg Hx        Medical History Reviewed by provider this encounter:  Tobacco  Allergies  Meds  Problems  Med Hx  Surg Hx  Fam Hx          Objective   There were no vitals taken for this visit.     Mental Status Evaluation:    Appearance age appropriate, casually dressed, looks stated age   Behavior cooperative, calm   Speech normal rate, normal volume, normal pitch, spontaneous   Mood more depressed   Affect constricted   Thought Processes organized, goal directed   Thought Content no overt delusions   Perceptual Disturbances: no auditory hallucinations, no visual hallucinations   Abnormal Thoughts  Risk Potential Suicidal ideation - None  Homicidal ideation - None  Potential for aggression - No   Orientation oriented to person, place, time/date, and situation   Memory recent and remote memory grossly intact   Consciousness alert and awake   Attention Span Concentration Span attention span and concentration are age appropriate   Intellect appears to be of average intelligence   Insight intact   Judgement intact   Muscle Strength and  Gait normal muscle strength and normal muscle tone, normal gait and normal balance   Motor activity no abnormal movements   Language no difficulty naming common objects, no difficulty repeating a phrase, no difficulty writing a sentence   Fund of Knowledge adequate knowledge of current events  adequate fund of knowledge regarding past history  adequate fund of knowledge regarding vocabulary        Laboratory Results: I have personally reviewed all pertinent laboratory/tests results    Suicide/Homicide Risk Assessment:    Risk of Harm to Self:  The following ratings are based on assessment at the time of the interview  Based on today's assessment, Darian  "presents the following risk of harm to self: none    Risk of Harm to Others:  The following ratings are based on assessment at the time of the interview  Based on today's assessment, Darian presents the following risk of harm to others: none    The following interventions are recommended: Continue medication management. No other intervention changes indicated at this time.    Psychotherapy Provided:     Individual psychotherapy provided: No    Treatment Plan:    Completed and signed during the session: Not applicable - Treatment Plan to be completed by [facility] therapist.    Goals: Progress towards Treatment Plan goals - Yes, progressing, as evidenced by subjective findings in HPI/Subjective Section and in Assessment and Plan Section    Depression Follow-up Plan Completed: Not applicable    Note Share:    This note was shared with patient.    Visit Time  Visit Start Time: 12:30 PM  Visit Stop Time: 1:00 PM  Total Visit Duration: 30 minutes    Portions of the record may have been created with voice recognition software. Occasional wrong word or \"sound a like\" substitutions may have occurred due to the inherent limitations of voice recognition software. Read the chart carefully and recognize, using context, where substitutions have occurred.    Alejandro Carrasco 05/15/25       [1]   Allergies  Allergen Reactions    Lamotrigine Hives     "

## 2025-05-15 NOTE — ASSESSMENT & PLAN NOTE
Not at goal - trial divalproex  mg qhs; continue sertraline 50 mg qd; continue talk therapy; send update via MyChart/phone call in 2 weeks; f/u in 4-6 weeks    Orders:    divalproex sodium (Depakote ER) 500 mg 24 hr tablet; Take 1 tablet (500 mg total) by mouth daily at bedtime

## 2025-05-20 ENCOUNTER — OFFICE VISIT (OUTPATIENT)
Dept: OBGYN CLINIC | Facility: CLINIC | Age: 65
End: 2025-05-20
Payer: COMMERCIAL

## 2025-05-20 VITALS — HEIGHT: 67 IN | WEIGHT: 229 LBS | BODY MASS INDEX: 35.94 KG/M2

## 2025-05-20 DIAGNOSIS — M17.12 ARTHRITIS OF LEFT KNEE: Primary | ICD-10-CM

## 2025-05-20 PROCEDURE — 99212 OFFICE O/P EST SF 10 MIN: CPT | Performed by: ORTHOPAEDIC SURGERY

## 2025-05-20 PROCEDURE — 20610 DRAIN/INJ JOINT/BURSA W/O US: CPT | Performed by: ORTHOPAEDIC SURGERY

## 2025-05-20 RX ORDER — BUPIVACAINE HYDROCHLORIDE 2.5 MG/ML
2 INJECTION, SOLUTION EPIDURAL; INFILTRATION; INTRACAUDAL; PERINEURAL
Status: COMPLETED | OUTPATIENT
Start: 2025-05-20 | End: 2025-05-20

## 2025-05-20 RX ORDER — TRIAMCINOLONE ACETONIDE 40 MG/ML
80 INJECTION, SUSPENSION INTRA-ARTICULAR; INTRAMUSCULAR
Status: COMPLETED | OUTPATIENT
Start: 2025-05-20 | End: 2025-05-20

## 2025-05-20 RX ADMIN — BUPIVACAINE HYDROCHLORIDE 2 ML: 2.5 INJECTION, SOLUTION EPIDURAL; INFILTRATION; INTRACAUDAL; PERINEURAL at 10:30

## 2025-05-20 RX ADMIN — TRIAMCINOLONE ACETONIDE 80 MG: 40 INJECTION, SUSPENSION INTRA-ARTICULAR; INTRAMUSCULAR at 10:30

## 2025-05-20 NOTE — ASSESSMENT & PLAN NOTE
Cortisone injection given today patient does take Tylenol on occasion and CBD oil.  Patient reports that the cortisone lasted her approximately 2 and half to 2-3/4 months and then started wearing off he would like to proceed with lubricant injections.  We may look at  brace also in the future.

## 2025-05-20 NOTE — PROGRESS NOTES
Name: Aldair Juárez Jr.      : 1960      MRN: [id number]  Encounter Provider: Marshall Yee DO  Encounter Date: 2025   Encounter department: [facility]  :  Assessment & Plan  Arthritis of left knee  Cortisone injection given today patient does take Tylenol on occasion and CBD oil.  Patient reports that the cortisone lasted her approximately 2 and half to 2-3/4 months and then started wearing off he would like to proceed with lubricant injections.  We may look at  brace also in the future.                     To Do Next Visit:   Lubricant injection    Subjective:    Aldair Juárez Jr. is a 65 y.o. male who presents today patient comes in today with regards to his left knee.  He has known arthritis.  He was last seen .  He was provided a cortisone injection.  He reports that has worked until now.  He would like to do a cortisone injection again today.  His pain is 8 out of 10.  He would also like to order the lubricant injections.  He uses CBD oil and occasionally takes Tylenol to help with his pain.  He is also participated with physical therapy.      The following portions of the patient's history were reviewed and updated as appropriate: allergies, current medications, past family history, past social history, past surgical history and problem list.      Social History     Socioeconomic History    Marital status: /Civil Union     Spouse name: Not on file    Number of children: 1    Years of education: Not on file    Highest education level: Not on file   Occupational History    Occupation: NETWORK ENGENEER   Tobacco Use    Smoking status: Never    Smokeless tobacco: Never   Vaping Use    Vaping status: Never Used   Substance and Sexual Activity    Alcohol use: Yes     Alcohol/week: 1.0 standard drink of alcohol     Types: 1 Cans of beer per week     Comment: rarely drink    Drug use: No    Sexual activity: Yes     Partners: Female      Birth control/protection: None   Other Topics Concern    Not on file   Social History Narrative        Lives with wife    1 child - 1 son    NETWORK ENGENEER    [employer]     Social Drivers of Health     Financial Resource Strain: Not on file   Food Insecurity: Not on file   Transportation Needs: Not on file   Physical Activity: Not on file   Stress: Not on file   Social Connections: Not on file   Intimate Partner Violence: Not on file   Housing Stability: Not on file     Past Medical History:   Diagnosis Date    Anxiety     Asthma     Asthma due to seasonal allergies     Benign essential hypertension 08/16/2019    Bipolar 2 disorder (HCC) 02/20/2015    CPAP (continuous positive airway pressure) dependence     Depression     Diverticulosis     History of colon polyps     History of colon polyps 12/08/2023    Hypertension     Hypertriglyceridemia 06/01/2022    Hypogonadism in male 06/01/2022    IFG (impaired fasting glucose)     Mixed hyperlipidemia 08/16/2019    Morbid obesity (HCC) 04/13/2021    Obesity     RUKHSANA on CPAP 10/14/2020    Osteoarthritis of hips, bilateral 02/17/2025    Osteoarthritis of left knee 02/17/2025    Soledad's syndrome     x 2, Urethritis, Conjunctivtis, Joint Pains     Past Surgical History:   Procedure Laterality Date    COLONOSCOPY      WISDOM TOOTH EXTRACTION       Allergies[1]  Medications Ordered Prior to Encounter[2]         Objective:    Review of Systems  Pertinent items are noted in HPI.  All other systems were reviewed and are negative.    Physical Exam  Cons: Appears well.  No apparent distress.  Psych: Alert. Oriented x3.  Mood and affect normal.  Eyes: PERRLA, EOMI  Resp: Normal effort.  No audible wheezing or stridor.  CV: Palpable pulse.  No discernable arrhythmia.  No LE edema.  Lymph:  No palpable cervical, axillary, or inguinal lymphadenopathy.  Skin: Warm.  No palpable masses.  No visible lesions.  Neuro: Normal muscle tone.  Normal and symmetric DTR's.    BMI:  "  Estimated body mass index is 35.6 kg/m² as calculated from the following:    Height as of this encounter: 5' 7.25\" (1.708 m).    Weight as of this encounter: 104 kg (229 lb).  Lab Results   Component Value Date    HGBA1C 5.9 (H) 02/07/2025         Ortho Exam:  Mild effusion no erythema no ecchymosis range of motion within normal limits full extension full flexion.  Mild crepitus with range of motion and tenderness over the medial joint line.    Procedures  Large joint arthrocentesis: L knee    Performed by: Marshall Yee DO  Authorized by: Mrashall Yee DO    Universal Protocol:  Consent given by: patient  Time out: Immediately prior to procedure a \"time out\" was called to verify the correct patient, procedure, equipment, support staff and site/side marked as required.  Supporting Documentation  Indications: pain     Is this a Visco injection? NoProcedure Details  Location: knee - L knee  Needle size: 22 G  Ultrasound guidance: no  Approach: anterolateral  Medications administered: 2 mL bupivacaine (PF) 0.25 %; 80 mg triamcinolone acetonide 40 mg/mL                  Portions of the record may have been created with voice recognition software.  Occasional wrong word or \"sound a like\" substitutions may have occurred due to the inherent limitations of voice recognition software.  Read the chart carefully and recognize, using context, where substitutions have occurred.       [1]   Allergies  Allergen Reactions    Lamotrigine Hives   [2]   Current Outpatient Medications on File Prior to Visit   Medication Sig Dispense Refill    albuterol (ProAir HFA) 90 mcg/act inhaler Inhale 2 puffs every 6 (six) hours as needed for wheezing 8.5 g 0    atorvastatin (LIPITOR) 20 mg tablet Take 1 tablet (20 mg total) by mouth daily at bedtime 90 tablet 1    cholecalciferol (VITAMIN D3) 1,000 units tablet Take 1,000 Units by mouth daily      Cyanocobalamin (B-12) 1000 MCG TABS Take 1 tablet by mouth in the morning      divalproex sodium " (Depakote ER) 500 mg 24 hr tablet Take 1 tablet (500 mg total) by mouth daily at bedtime 30 tablet 1    fenofibrate 160 MG tablet TAKE 1 TABLET DAILY 90 tablet 1    Fluticasone-Salmeterol (Advair Diskus) 250-50 mcg/dose inhaler Inhale 1 puff every 12 (twelve) hours Rinse mouth after use. 60 blister 5    lisinopril (ZESTRIL) 30 mg tablet Take 1 tablet (30 mg total) by mouth daily 90 tablet 1    Magnesium 400 MG TABS Take 1 tablet by mouth in the morning      Omega-3 Fatty Acids (Fish Oil) 1000 MG CPDR Take 3,000 mg by mouth      sertraline (ZOLOFT) 50 mg tablet Take 1 tablet (50 mg total) by mouth daily 90 tablet 1    Zinc 50 MG TABS Take 50 mg by mouth daily       No current facility-administered medications on file prior to visit.

## 2025-06-06 ENCOUNTER — PROCEDURE VISIT (OUTPATIENT)
Dept: OBGYN CLINIC | Facility: CLINIC | Age: 65
End: 2025-06-06
Payer: COMMERCIAL

## 2025-06-06 ENCOUNTER — APPOINTMENT (OUTPATIENT)
Dept: LAB | Facility: CLINIC | Age: 65
End: 2025-06-06
Attending: FAMILY MEDICINE
Payer: COMMERCIAL

## 2025-06-06 VITALS — BODY MASS INDEX: 35.94 KG/M2 | HEIGHT: 67 IN | WEIGHT: 229 LBS

## 2025-06-06 DIAGNOSIS — E78.2 MIXED HYPERLIPIDEMIA: ICD-10-CM

## 2025-06-06 DIAGNOSIS — M17.12 ARTHRITIS OF LEFT KNEE: Primary | ICD-10-CM

## 2025-06-06 DIAGNOSIS — Z79.899 ON VALPROIC ACID THERAPY: ICD-10-CM

## 2025-06-06 DIAGNOSIS — R74.8 LOW SERUM ALKALINE PHOSPHATASE: ICD-10-CM

## 2025-06-06 LAB — VALPROATE SERPL-MCNC: 28 UG/ML (ref 50–125)

## 2025-06-06 PROCEDURE — 80165 DIPROPYLACETIC ACID FREE: CPT

## 2025-06-06 PROCEDURE — 36415 COLL VENOUS BLD VENIPUNCTURE: CPT

## 2025-06-06 PROCEDURE — 20610 DRAIN/INJ JOINT/BURSA W/O US: CPT | Performed by: PHYSICIAN ASSISTANT

## 2025-06-06 PROCEDURE — 80164 ASSAY DIPROPYLACETIC ACD TOT: CPT

## 2025-06-06 NOTE — PROGRESS NOTES
Name: Aldair Juárez Jr.      : 1960      MRN: [id number]  Encounter Provider: Marshall Yee DO  Encounter Date: 2025   Encounter department: [facility]  :  Assessment & Plan  Arthritis of left knee               Patient is here for his 1st injection of Synvisc into the Left knee.     Physical exam of the knee shows no effusion, no ecchymosis.    Large joint arthrocentesis: L knee    Performed by: Mary Garcia PA-C  Authorized by: Marshall Yee DO    Universal Protocol:  Consent: Verbal consent obtained  Risks and benefits: risks, benefits and alternatives were discussed  Consent given by: patient  Patient understanding: patient states understanding of the procedure being performed  Patient identity confirmed: verbally with patient  Supporting Documentation  Indications: pain     Is this a Visco injection? Yes  Non-Pharmacologic Treatments Attempted: Home Exercise  Pharmacologic Treatments Attempted: cortisone  Pain Score: 7Procedure Details  Location: knee - L knee  Approach: anterolateral  Medications administered: 16 mg hylan 16 MG/2ML    Patient tolerance: patient tolerated the procedure well with no immediate complications  Dressing:  Sterile dressing applied        Patient tolerated procedure follow up 1 week for 2nd injection

## 2025-06-08 LAB — VALPROATE FREE SERPL-MCNC: NORMAL UG/ML (ref 6–22)

## 2025-06-08 RX ORDER — ATORVASTATIN CALCIUM 20 MG/1
20 TABLET, FILM COATED ORAL
Qty: 90 TABLET | Refills: 0 | Status: SHIPPED | OUTPATIENT
Start: 2025-06-08

## 2025-06-09 ENCOUNTER — TELEPHONE (OUTPATIENT)
Age: 65
End: 2025-06-09

## 2025-06-09 NOTE — TELEPHONE ENCOUNTER
Called Darian ([phone]) - he said he has been doing ok and wants to keep medication as is and then follow up at appointment on 7/1.

## 2025-06-09 NOTE — TELEPHONE ENCOUNTER
The patient contacted the office, requesting to speak with his provider. The patient explained he just got a blood test and it was below what was needed. The writer transferred the call to the nurses for assistance.

## 2025-06-09 NOTE — TELEPHONE ENCOUNTER
Spoke to Darian - his valproic acid level is 28. He is wondering if Alejandro wants to increase his Depakote. Clinical will follow up as advised.

## 2025-06-12 DIAGNOSIS — F31.81 BIPOLAR 2 DISORDER (HCC): ICD-10-CM

## 2025-06-12 RX ORDER — DIVALPROEX SODIUM 500 MG/1
500 TABLET, FILM COATED, EXTENDED RELEASE ORAL
Qty: 30 TABLET | Refills: 1 | Status: SHIPPED | OUTPATIENT
Start: 2025-06-12 | End: 2025-06-16

## 2025-06-16 ENCOUNTER — PROCEDURE VISIT (OUTPATIENT)
Dept: OBGYN CLINIC | Facility: CLINIC | Age: 65
End: 2025-06-16
Payer: COMMERCIAL

## 2025-06-16 ENCOUNTER — SOCIAL WORK (OUTPATIENT)
Dept: BEHAVIORAL/MENTAL HEALTH CLINIC | Facility: CLINIC | Age: 65
End: 2025-06-16
Payer: COMMERCIAL

## 2025-06-16 ENCOUNTER — TELEPHONE (OUTPATIENT)
Age: 65
End: 2025-06-16

## 2025-06-16 VITALS — BODY MASS INDEX: 35.94 KG/M2 | WEIGHT: 229 LBS | HEIGHT: 67 IN

## 2025-06-16 DIAGNOSIS — F31.81 BIPOLAR 2 DISORDER (HCC): ICD-10-CM

## 2025-06-16 DIAGNOSIS — M17.12 ARTHRITIS OF LEFT KNEE: Primary | ICD-10-CM

## 2025-06-16 DIAGNOSIS — F43.10 POST TRAUMATIC STRESS DISORDER (PTSD): Primary | ICD-10-CM

## 2025-06-16 DIAGNOSIS — F41.1 GENERALIZED ANXIETY DISORDER: ICD-10-CM

## 2025-06-16 PROCEDURE — 90834 PSYTX W PT 45 MINUTES: CPT | Performed by: SOCIAL WORKER

## 2025-06-16 PROCEDURE — 20610 DRAIN/INJ JOINT/BURSA W/O US: CPT | Performed by: PHYSICIAN ASSISTANT

## 2025-06-16 RX ORDER — DIVALPROEX SODIUM 250 MG/1
250 TABLET, DELAYED RELEASE ORAL
Qty: 30 TABLET | Refills: 1 | Status: SHIPPED | OUTPATIENT
Start: 2025-06-16 | End: 2025-08-15

## 2025-06-16 RX ORDER — DIVALPROEX SODIUM 500 MG/1
500 TABLET, FILM COATED, EXTENDED RELEASE ORAL
Qty: 30 TABLET | Refills: 1 | Status: SHIPPED | OUTPATIENT
Start: 2025-06-16

## 2025-06-16 NOTE — TELEPHONE ENCOUNTER
Nurse spoke with Aldair Juárez Jr. - reviewed response from clinician. Aldair Juárez Jr. verbalized understanding of same.     Darian states he is tolerating the medication well.  Please send script to [facility] in [place].

## 2025-06-16 NOTE — TELEPHONE ENCOUNTER
Nurse spoke with Darian - asking about an increases on Depakote r/t lab results.     Darian notes no positive or negative effects from Depakote.     Nursing will follow up as directed from provider.

## 2025-06-16 NOTE — TELEPHONE ENCOUNTER
Aldair Juárez Jr. requested a call back to discuss medication. Pt states after he received the results from his Blood work that he could decide if he wanted to increase his Depakote  mg. He has decided he would like to increase this medication.  Thank you.

## 2025-06-16 NOTE — PROGRESS NOTES
Name: Aldair Juárez Jr.      : 1960      MRN: [id number]  Encounter Provider: Marshall Yee DO  Encounter Date: 2025   Encounter department: [facility]  :  Assessment & Plan  Arthritis of left knee               Patient is here for his 2nd injection of Synvisc into the Left knee.     Physical exam of the knee shows no effusion, no ecchymosis.    Large joint arthrocentesis: L knee    Performed by: Mary Garcia PA-C  Authorized by: Marshall Yee DO    Universal Protocol:  Consent: Verbal consent obtained  Risks and benefits: risks, benefits and alternatives were discussed  Consent given by: patient  Patient understanding: patient states understanding of the procedure being performed  Patient identity confirmed: verbally with patient  Supporting Documentation  Indications: pain     Is this a Visco injection? Yes  Non-Pharmacologic Treatments Attempted: Home Exercise  Pharmacologic Treatments Attempted: cortisone  Pain Score: 7Procedure Details  Location: knee - L knee  Approach: anterolateral  Medications administered: 16 mg hylan 16 MG/2ML    Patient tolerance: patient tolerated the procedure well with no immediate complications  Dressing:  Sterile dressing applied          Patient tolerated procedure follow up 1 week for 2nd injection

## 2025-06-17 NOTE — PSYCH
1. Post traumatic stress disorder (PTSD)        2. Generalized anxiety disorder              Data: Pt reports difficult month since last session. Pt cancelled last session as he was ashamed that he had decompensated since the previous session when he reports he was doing very well. Pt and writer discussed that therapy is very key when there is decompensation to discuss triggers and how to work through the triggers. Pt reports the trigger was the usual one. Pt impulsively bought something. Admitted it to his wife (very good job!). And when he could not return it, wife was very frustrated, and wife felt pt should not keep it as it rewards sneakiness. Pt felt like he was being treated like a child, and went into a severe hypoaroused state where he felt depressed, feeling sorry for himself, passive aggressive, angry internally, withdrawn, not talking to wife to get back at her. Pt admits this episode went on for a month. Pt states he was triggered and his way of handling it was to withdraw and punish his wife. Central to this session was pt identifying that he loathes himself at times, and does not feel like he deserved forgiveness and kindness. This causes pt to sabotage things, and reject his wife, even though she is trying to help him.     Goal for this session, is for pt to forgive himself, and start talking to his wife again. Goal is to recognize an episode, and use the skills discussed in therapy, such as cope ahead, and stop DBT skills. Pt is encouraged to start using these, and to get himself out of an episode through exercise and healthy choices.         Plan: Follow up in 2 weeks.     Psychotherapy Provided: Individual Psychotherapy 45 minutes     Length of time in session: 45 minutes, follow up in 2 weeks    Goals addressed in session: Goal 1 and Goal 2     Pain:      none    0    Current suicide risk : Low     Pt is future oriented and not suicidal.       Behavioral Health Treatment Plan [facility]: Diagnosis  and Treatment Plan explained to Clarissa Romo relates understanding diagnosis and is agreeable to Treatment Plan. Yes     Visit start and stop times:       06/16/25  Start Time: 1200  Stop Time: 1245  Total Visit Time: 45 minutes

## 2025-06-30 ENCOUNTER — PROCEDURE VISIT (OUTPATIENT)
Dept: OBGYN CLINIC | Facility: CLINIC | Age: 65
End: 2025-06-30
Payer: COMMERCIAL

## 2025-06-30 DIAGNOSIS — M17.12 ARTHRITIS OF LEFT KNEE: Primary | ICD-10-CM

## 2025-06-30 PROCEDURE — 20610 DRAIN/INJ JOINT/BURSA W/O US: CPT | Performed by: ORTHOPAEDIC SURGERY

## 2025-06-30 NOTE — PROGRESS NOTES
"Name: Aldair Juárez Jr.      : 1960      MRN: [id number]  Encounter Provider: Marshall Yee DO  Encounter Date: 2025   Encounter department: [facility]  :  Assessment & Plan  Arthritis of left knee         Patient does report that he has some burning sensation on the medial side of his knee.      Patient is here for his third injection of Synvisc into the Left knee.     Physical exam of the knee shows no effusion, no ecchymosis.    Large joint arthrocentesis: L knee    Performed by: Marshall Yee DO  Authorized by: Marshall Yee DO    Universal Protocol:  Consent given by: patient  Time out: Immediately prior to procedure a \"time out\" was called to verify the correct patient, procedure, equipment, support staff and site/side marked as required.  Supporting Documentation  Indications: pain     Is this a Visco injection? Yes  Non-Pharmacologic Treatments Attempted: Home Exercise  Pharmacologic Treatments Attempted: Cortisone  Pain Score: 8Procedure Details  Location: knee - L knee  Needle size: 22 G  Ultrasound guidance: no  Approach: anterolateral  Medications administered: 30 mg sodium hyaluronate 30 mg/2 mL    Patient tolerance: patient tolerated the procedure well with no immediate complications          Patient tolerated procedure follow up 4 weeks from now for possible cortisone injection if he still having pain.  I did report to him that the burning on the inside his knees still is arthritis and inflammation if he still having pain we will do the cortisone on the medial side of the knee to help calm down the inflammation.  "

## 2025-07-01 ENCOUNTER — OFFICE VISIT (OUTPATIENT)
Dept: PSYCHIATRY | Facility: CLINIC | Age: 65
End: 2025-07-01
Payer: COMMERCIAL

## 2025-07-01 DIAGNOSIS — Z79.899 ON VALPROIC ACID THERAPY: ICD-10-CM

## 2025-07-01 DIAGNOSIS — F41.1 GENERALIZED ANXIETY DISORDER: ICD-10-CM

## 2025-07-01 DIAGNOSIS — F31.81 BIPOLAR 2 DISORDER (HCC): Primary | ICD-10-CM

## 2025-07-01 DIAGNOSIS — F43.10 POST TRAUMATIC STRESS DISORDER (PTSD): ICD-10-CM

## 2025-07-01 PROCEDURE — 99214 OFFICE O/P EST MOD 30 MIN: CPT | Performed by: PHYSICIAN ASSISTANT

## 2025-07-01 NOTE — ASSESSMENT & PLAN NOTE
Improving - continue sertraline 50 mg qd, divalproex 750 mg qhs; continue talk therapy; f/u in 6-8 weeks

## 2025-07-01 NOTE — ASSESSMENT & PLAN NOTE
VA level ordered, to be done prior to f/u in 6-8 weeks  Orders:    Valproic Acid, Total & Free; Future

## 2025-07-01 NOTE — PSYCH
MEDICATION MANAGEMENT NOTE    Name: Aldair Juárez Jr.      : 1960      MRN: [id number]  Encounter Provider: Alejandro Carrasco  Encounter Date: 2025   Encounter department: [facility]    Insurance: Payor: BLUE CROSS / Plan: CAPITAL BC PLAN 361 / Product Type: Blue Fee for Service /      Reason for Visit:   Chief Complaint   Patient presents with    Follow-up    Medication Management   :  Assessment & Plan  Bipolar 2 disorder (HCC)  Improving - continue sertraline 50 mg qd, divalproex 750 mg qhs; continue talk therapy; f/u in 6-8 weeks        Generalized anxiety disorder  Improving - continue sertraline 50 mg qd, divalproex 750 mg qhs; continue talk therapy; f/u in 6-8 weeks        Post traumatic stress disorder (PTSD)  Improving - continue sertraline 50 mg qd, divalproex 750 mg qhs; continue talk therapy; f/u in 6-8 weeks        On valproic acid therapy  VA level ordered, to be done prior to f/u in 6-8 weeks  Orders:    Valproic Acid, Total & Free; Future       He tolerates the divalproex very well and does feel that it is improving his mood and anxiety significantly. He also feels his sleep is improved. At this time no changes recommended to medication but we can recheck VA levels since it has been 2+ weeks since last increase.     Treatment Recommendations:    Continue current medication:     - sertraline 50 mg qd    - divalproex  mg qhs    Educated about diagnosis and treatment modalities. Verbalizes understanding and agreement with the treatment plan.  Discussed self monitoring of symptoms, and symptom monitoring tools.  Discussed medications and if treatment adjustment was needed or desired.  Medication management every 6 weeks  Does not want any medication changes  Aware of 24 hour and weekend coverage for urgent situations accessed by calling [facility] main practice number  Continue psychotherapy with [facility] therapist Aries  Alice  I am scheduling this patient out for greater than 3 months: No    Medications Risks/Benefits:      Risks, Benefits And Possible Side Effects Of Medications:    Risks, benefits, and possible side effects of medications explained to Darian and he (or legal representative) verbalizes understanding and agreement for treatment.    Controlled Medication Discussion:     Not applicable      History of Present Illness     Darian is seen today for a follow up for Follow-up and Medication Management, Bipolar Disorder type II, Generalized Anxiety Disorder, and PTSD. Since his last visit he started on divalproex and got levels checked and they were a bit low. Dose was increased and he is tolerating it well and does feel that there is improvement in mood, anxiety, and sleep. He states his wife has also noticed an improvement in his mood. He is not sure if it makes him slightly tired during the day, overall he feels his energy levels during the day are good.     Past medication trials: Lamictal (rash), Cymbalta (zora), Wellbutrin, Lithium, Seroquel (no benefit), Abilify (no benefit), Rexulti     He denies any suicidal ideation, intent or plan at present; denies any homicidal ideation, intent or plan at present.    He denies any auditory hallucinations, denies any visual hallucinations, denies any delusions.    He denies any side effects from current psychiatric medications.    HPI ROS Appetite Changes and Sleep:     He reports normal sleep, normal appetite, normal energy level    Review Of Systems: A review of systems is obtained and is negative except for the pertinent positives listed in HPI/Subjective above.      Current Rating Scores:     Current VENANCIO-7   VENANCIO-7 Flowsheet Screening      Flowsheet Row Most Recent Value   Over the last two weeks, how often have you been bothered by the following problems?     Feeling nervous, anxious, or on edge 0    Not being able to stop or control worrying 0    Worrying too much about  different things 0    Trouble relaxing  0    Being so restless that it's hard to sit still 0    Becoming easily annoyed or irritable  1    Feeling afraid as if something awful might happen 0    How difficult have these problems made it for you to do your work, take care of things at home, or get along with other people?  Somewhat difficult    VENANCIO Score  1             Areas of Improvement: reviewed in HPI/Subjective Section and reviewed in Assessment and Plan Section      Past Medical History[1]  Past Surgical History[2]  Allergies: Allergies[3]    Current Outpatient Medications   Medication Instructions    albuterol (ProAir HFA) 90 mcg/act inhaler 2 puffs, Inhalation, Every 6 hours PRN    atorvastatin (LIPITOR) 20 mg, Oral, Daily at bedtime    cholecalciferol (VITAMIN D3) 1,000 Units, Daily    Cyanocobalamin (B-12) 1000 MCG TABS 1 tablet, Daily    divalproex sodium (DEPAKOTE ER) 500 mg, Oral, Daily at bedtime    divalproex sodium (DEPAKOTE) 250 mg, Oral, Daily at bedtime, Take in addition to 500 mg for a total of 750 mg nightly    fenofibrate 160 mg, Oral, Daily    Fish Oil 3,000 mg    Fluticasone-Salmeterol (Advair Diskus) 250-50 mcg/dose inhaler 1 puff, Inhalation, Every 12 hours, Rinse mouth after use.    lisinopril (ZESTRIL) 30 mg, Oral, Daily    Magnesium 400 MG TABS 1 tablet, Daily    sertraline (ZOLOFT) 50 mg, Oral, Daily    Zinc 50 mg, Daily        Substance Abuse History:    Tobacco, Alcohol and Drug Use History     Tobacco Use    Smoking status: Never    Smokeless tobacco: Never   Vaping Use    Vaping status: Never Used   Substance Use Topics    Alcohol use: Yes     Alcohol/week: 1.0 standard drink of alcohol     Types: 1 Cans of beer per week     Comment: rarely drink    Drug use: No     Alcohol Use: Not At Risk (6/8/2021)    AUDIT-C     Frequency of Alcohol Consumption: Monthly or less     Average Number of Drinks: 1 or 2     Frequency of Binge Drinking: Never       Social History:    Social History      Socioeconomic History    Marital status: /Civil Union     Spouse name: Not on file    Number of children: 1    Years of education: Not on file    Highest education level: Not on file   Occupational History    Occupation: [employer]   Other Topics Concern    Not on file   Social History Narrative        Lives with wife    1 child - 1 son    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     Mental Status Evaluation:    Appearance age appropriate, casually dressed   Behavior cooperative, calm   Speech normal rate, normal volume, normal pitch, spontaneous   Mood improved   Affect slightly brighter   Thought Processes organized, goal directed   Thought Content no overt delusions   Perceptual Disturbances: no auditory hallucinations, no visual hallucinations   Abnormal Thoughts  Risk Potential Suicidal ideation - None  Homicidal ideation - None  Potential for aggression - No   Orientation oriented to person, place, time/date, and situation   Memory recent and remote memory grossly intact   Consciousness alert and awake   Attention Span Concentration Span attention span and concentration are age appropriate   Intellect appears to be of average intelligence   Insight intact   Judgement intact   Muscle Strength and  Gait normal muscle strength and normal muscle tone, normal gait and normal balance   Motor activity no abnormal movements   Language no difficulty naming common objects, no difficulty repeating a phrase, no difficulty writing a sentence   Fund of Knowledge adequate knowledge of current events  adequate fund of knowledge regarding past history  adequate fund of knowledge regarding vocabulary        Laboratory Results: I have personally reviewed all pertinent  "laboratory/tests results    Suicide/Homicide Risk Assessment:    Risk of Harm to Self:  The following ratings are based on assessment at the time of the interview  Based on today's assessment, Darian presents the following risk of harm to self: none    Risk of Harm to Others:  The following ratings are based on assessment at the time of the interview  Based on today's assessment, Darian presents the following risk of harm to others: none    The following interventions are recommended: Continue medication management. No other intervention changes indicated at this time.    Psychotherapy Provided:     Individual psychotherapy provided: No    Treatment Plan:    Completed and signed during the session: Not applicable - Treatment Plan to be completed by [facility] therapist.    Goals: Progress towards Treatment Plan goals - Yes, progressing, as evidenced by subjective findings in HPI/Subjective Section and in Assessment and Plan Section    Depression Follow-up Plan Completed: Not applicable    Note Share:    This note was shared with patient.    Visit Time  Visit Start Time: 11:33 AM  Visit Stop Time: 11:44 AM  Total Visit Duration: 11 minutes    Portions of the record may have been created with voice recognition software. Occasional wrong word or \"sound a like\" substitutions may have occurred due to the inherent limitations of voice recognition software. Read the chart carefully and recognize, using context, where substitutions have occurred.    Alejandro Carrasco 07/01/25       [1]   Past Medical History:  Diagnosis Date    Anxiety     Asthma     Asthma due to seasonal allergies     Benign essential hypertension 08/16/2019    Bipolar 2 disorder (HCC) 02/20/2015    CPAP (continuous positive airway pressure) dependence     Depression     Diverticulosis     History of colon polyps     History of colon polyps 12/08/2023    Hypertension     Hypertriglyceridemia 06/01/2022    Hypogonadism in male 06/01/2022 "    IFG (impaired fasting glucose)     Mixed hyperlipidemia 08/16/2019    Morbid obesity (HCC) 04/13/2021    Obesity     RUKHSANA on CPAP 10/14/2020    Osteoarthritis of hips, bilateral 02/17/2025    Osteoarthritis of left knee 02/17/2025    Soledad's syndrome     x 2, Urethritis, Conjunctivtis, Joint Pains   [2]   Past Surgical History:  Procedure Laterality Date    COLONOSCOPY      WISDOM TOOTH EXTRACTION     [3]   Allergies  Allergen Reactions    Lamotrigine Hives   [4]   Family History  Problem Relation Name Age of Onset    Hypertension Mother Richelle     Hyperlipidemia Mother Richelle     Anxiety disorder Mother Richelle     Bipolar disorder Mother Richelle     Osteoarthritis Mother Richelle     Coronary artery disease Mother Richelle     Hearing loss Mother Richelle     Hypertension Father Aldair Sr     Coronary artery disease Father Aldair Sr 75    Hyperlipidemia Father Aldair Sr     Bipolar disorder Father Aldair Sr     Obesity Father Aldair Sr     Depression Father Aldair Sr     Heart failure Father Aldair Sr     Kidney disease Father Aldair Sr         CKD    COPD Father Aldair Sr     Sleep apnea Father Aldair Sr     Diabetes type II Father Aldair Sr     Heart attack Father Aldair Sr 75    Breast cancer Maternal Grandmother Grandma Gellock     Bipolar disorder Maternal Grandmother Grandma Gellock     Osteoarthritis Maternal Grandmother Grandma Gellock     Coronary artery disease Maternal Grandmother Grandma Gellock     Heart attack Maternal Grandmother Grandma Gellock     No Known Problems Brother Sergio     Aneurysm Maternal Grandfather          Brain    Obesity Paternal Grandmother      Coronary artery disease Paternal Grandfather      Heart attack Paternal Grandfather      No Known Problems Son Aldair Hurd     Heart disease Neg Hx      Asthma Neg Hx      Arrhythmia Neg Hx      Anemia Neg Hx      Clotting disorder Neg Hx      Fainting Neg Hx

## 2025-07-07 ENCOUNTER — SOCIAL WORK (OUTPATIENT)
Dept: BEHAVIORAL/MENTAL HEALTH CLINIC | Facility: CLINIC | Age: 65
End: 2025-07-07
Payer: COMMERCIAL

## 2025-07-07 DIAGNOSIS — F43.10 POST TRAUMATIC STRESS DISORDER (PTSD): Primary | ICD-10-CM

## 2025-07-07 DIAGNOSIS — F41.1 GENERALIZED ANXIETY DISORDER: ICD-10-CM

## 2025-07-07 PROCEDURE — 90837 PSYTX W PT 60 MINUTES: CPT | Performed by: SOCIAL WORKER

## 2025-07-07 NOTE — PSYCH
1. Post traumatic stress disorder (PTSD)        2. Generalized anxiety disorder              Data: Pt and writer continued discussion from last session. Pt is doing much better. No longer in hypo-arousal. Pt recognizes he was being unreasonable in his response to hurt from his wife, and spending 3 weeks in a hypo-aroused state was not benefiting anyone. Pt was able to see this last session and just coming to therapy was enough to jolt him out of the zone he was in.     Pt and writer discussed the tools he has learnt and how he can use mindfulness, exercise and the COPE ahead strategy to prevent himself from going into this place again. Pt is reminded of why he needs to spend three weeks in hypo-arousal when he has the tools to get out of this emotional state in a few minutes. Pt is also encouraged to use visualization  to practice getting himself out of hypo-arousal episodes. Seeing himself rebounding, forgiving himself, letting self pity and resentment go, transitioning to a better and more helpful place. Pt also adds that he can use his jeremy, and spirituality to get back into the window of tolerance, as he finds movement combined with prayer very helpful to get spiritual heaviness and self pity off of him.     Pt and writer also discussed how pt's relationship with father is likely being triggered by his wife and that pt has to find a way to getting over the fear of his wife, and going back to regressed childlike approach when pt feels in trouble. It is not needed as pt is safe.     Assessment: Writer and pt used DBT skills to address mood and emotional regulation problems.     Plan: Follow up in 2 weeks.     Psychotherapy Provided: Individual Psychotherapy 60 minutes     Length of time in session: 55 minutes, follow up in 2 weeks    Goals addressed in session: Goal 1 and Goal 2     Pain:      none    0    Current suicide risk : Low     Pt is future oriented and not suicidal.       Behavioral Health Treatment  Plan [facility]: Diagnosis and Treatment Plan explained to Clarissa, Clarissa relates understanding diagnosis and is agreeable to Treatment Plan. Yes     Visit start and stop times:       07/07/25  Start Time: 1100  Stop Time: 1155  Total Visit Time: 55 minutes

## 2025-07-09 ENCOUNTER — TELEPHONE (OUTPATIENT)
Age: 65
End: 2025-07-09

## 2025-07-09 NOTE — TELEPHONE ENCOUNTER
Patient called in and shared he was told by [employer] that the provider or someone from the office needs to call [facility] and provide fax number.    [employer] - [phone] opt 3.     Patient is requesting a call back from once this is completed.

## 2025-07-09 NOTE — TELEPHONE ENCOUNTER
Patient called in to get fax number for [place] [facility] to send prescriptions.    Patient given: [phone]

## 2025-07-17 DIAGNOSIS — F43.10 POST TRAUMATIC STRESS DISORDER (PTSD): ICD-10-CM

## 2025-07-17 DIAGNOSIS — F31.81 BIPOLAR 2 DISORDER (HCC): ICD-10-CM

## 2025-07-18 RX ORDER — DIVALPROEX SODIUM 250 MG/1
TABLET, DELAYED RELEASE ORAL
Qty: 30 TABLET | Refills: 0 | Status: SHIPPED | OUTPATIENT
Start: 2025-07-18

## 2025-07-21 ENCOUNTER — SOCIAL WORK (OUTPATIENT)
Dept: BEHAVIORAL/MENTAL HEALTH CLINIC | Facility: CLINIC | Age: 65
End: 2025-07-21
Payer: COMMERCIAL

## 2025-07-21 DIAGNOSIS — F31.81 BIPOLAR 2 DISORDER (HCC): Primary | ICD-10-CM

## 2025-07-21 DIAGNOSIS — F43.10 POST TRAUMATIC STRESS DISORDER (PTSD): ICD-10-CM

## 2025-07-21 DIAGNOSIS — F41.1 GENERALIZED ANXIETY DISORDER: ICD-10-CM

## 2025-07-21 PROCEDURE — 90837 PSYTX W PT 60 MINUTES: CPT | Performed by: SOCIAL WORKER

## 2025-07-21 NOTE — PSYCH
1. Bipolar 2 disorder (HCC)        2. Post traumatic stress disorder (PTSD)        3. Generalized anxiety disorder              Data: Pt and writer continue to work on healthy coping mechanisms for when pt is triggered. Wife is confrontational and pt preceives threat when she is like this, even when there is no threat. Pt leans into his emotions and follows his emotions, which leads to a lot of impulsive survival choices. Pt and writer reviewed strategies for coping with this, including DBT tool of [place] Ahead- key word use and also asking to leave the situation temporarily before the freinvendo medical train of emotions has taken off. Pt and writer also discussed some conflict in the marriage that comes from pt's impulsivity, and wanting to leave now, and irritability. Intervention discussed if for pt to go for a walk in the morning, until wife is ready to leave the house. This will help to burn off frustration and get pt in a better place to start the time off with his wife. Great outcomes will be produced.     Assessment: Pt doing much better. Perhaps a little expansive, but plenty of laughs today. Doing much better. Tools are being hones and used more and more. Pt is doing better, and wife is happier so pt feels better about himself. Still monitoring for progress and potential decompensation.     Plan: Follow up in 4 weeks.     Psychotherapy Provided: Individual Psychotherapy 60 minutes     Length of time in session: 55 minutes, follow up in 4 weeks    Goals addressed in session: Goal 1 and Goal 2     Pain:      none    0    Current suicide risk : Low     Pt is future oriented and not suicidal.       Behavioral Health Treatment Plan [facility]: Diagnosis and Treatment Plan explained to Clarissa Romo relates understanding diagnosis and is agreeable to Treatment Plan. Yes     Visit start and stop times:       07/21/25  Start Time: 1000  Stop Time: 1055  Total Visit Time: 55 minutes

## 2025-08-11 ENCOUNTER — SOCIAL WORK (OUTPATIENT)
Dept: BEHAVIORAL/MENTAL HEALTH CLINIC | Facility: CLINIC | Age: 65
End: 2025-08-11
Payer: COMMERCIAL